# Patient Record
Sex: MALE | Race: WHITE | NOT HISPANIC OR LATINO | Employment: OTHER | URBAN - NONMETROPOLITAN AREA
[De-identification: names, ages, dates, MRNs, and addresses within clinical notes are randomized per-mention and may not be internally consistent; named-entity substitution may affect disease eponyms.]

---

## 2017-01-27 ENCOUNTER — OFFICE VISIT (OUTPATIENT)
Dept: FAMILY MEDICINE CLINIC | Facility: CLINIC | Age: 67
End: 2017-01-27

## 2017-01-27 VITALS
WEIGHT: 145 LBS | BODY MASS INDEX: 21.98 KG/M2 | DIASTOLIC BLOOD PRESSURE: 60 MMHG | HEART RATE: 80 BPM | SYSTOLIC BLOOD PRESSURE: 104 MMHG | HEIGHT: 68 IN

## 2017-01-27 DIAGNOSIS — K21.9 GASTROESOPHAGEAL REFLUX DISEASE WITHOUT ESOPHAGITIS: Chronic | ICD-10-CM

## 2017-01-27 DIAGNOSIS — Z11.59 NEED FOR HEPATITIS C SCREENING TEST: ICD-10-CM

## 2017-01-27 DIAGNOSIS — E78.2 MIXED HYPERLIPIDEMIA: Chronic | ICD-10-CM

## 2017-01-27 DIAGNOSIS — I10 ESSENTIAL HYPERTENSION: Chronic | ICD-10-CM

## 2017-01-27 DIAGNOSIS — E11.9 TYPE 2 DIABETES MELLITUS WITHOUT COMPLICATION, WITHOUT LONG-TERM CURRENT USE OF INSULIN (HCC): Primary | Chronic | ICD-10-CM

## 2017-01-27 DIAGNOSIS — B35.1 ONYCHOMYCOSIS: ICD-10-CM

## 2017-01-27 PROCEDURE — 99214 OFFICE O/P EST MOD 30 MIN: CPT | Performed by: INTERNAL MEDICINE

## 2017-01-27 NOTE — PROGRESS NOTES
Subjective   Ronald Irizarry is a 66 y.o. male who presents to the office for follow-up and review of labs. He has diabetes and takes his medication as directed.  He has not been as compliant with diet through the holidays.  He monitors his blood sugar one time daily.  He has hyperlipidemia and takes pravastatin daily.  He has hypertension and his blood pressure has been well controlled.  He has osteoarthritis and takes over-the-counter NSAIDs as needed.  He takes Nexium daily for treatment of GERD.  He complains of thickening of his toenails.  He has taken oral Lamisil in the past, and is asking about taking this again.      History of Present Illness     The following portions of the patient's history were reviewed and updated as appropriate: allergies, current medications, past family history, past medical history, past social history, past surgical history and problem list.    Review of Systems   Constitutional: Negative for chills, fatigue and fever.   HENT: Negative for congestion, sneezing, sore throat and trouble swallowing.    Eyes: Negative for visual disturbance.   Respiratory: Negative for cough, chest tightness, shortness of breath and wheezing.    Cardiovascular: Negative for chest pain, palpitations and leg swelling.   Gastrointestinal: Negative for abdominal pain, constipation, diarrhea, nausea and vomiting.   Genitourinary: Negative for dysuria, frequency and urgency.   Musculoskeletal: Negative for neck pain.   Skin: Negative for rash.   Neurological: Negative for dizziness, weakness and headaches.   Psychiatric/Behavioral:        Patient denies any feelings of depression and has not felt down, hopeless or lost interest in any activities.   All other systems reviewed and are negative.      Objective   Physical Exam   Constitutional: He is oriented to person, place, and time. He appears well-developed and well-nourished. No distress.   HENT:   Head: Normocephalic and atraumatic.   Nose: Nose  normal.   Mouth/Throat: Oropharynx is clear and moist. No oropharyngeal exudate.   Eyes: Conjunctivae and EOM are normal. Pupils are equal, round, and reactive to light. No scleral icterus.   Neck: Normal range of motion. Neck supple.   Cardiovascular: Normal rate, regular rhythm and normal heart sounds.  Exam reveals no gallop and no friction rub.    No murmur heard.  Pulmonary/Chest: Effort normal and breath sounds normal. No respiratory distress. He has no wheezes. He has no rales.   Abdominal: Soft. Bowel sounds are normal. He exhibits no distension. There is no tenderness. There is no rebound and no guarding.   Musculoskeletal: Normal range of motion. He exhibits no edema.   Lymphadenopathy:     He has no cervical adenopathy.   Neurological: He is alert and oriented to person, place, and time. No cranial nerve deficit.   Skin: Skin is warm and dry. No rash noted.   Psychiatric: He has a normal mood and affect. His behavior is normal. Judgment and thought content normal.   Nursing note and vitals reviewed.      Assessment/Plan   Ronald was seen today for diabetes, hypertension and hyperlipidemia.    Diagnoses and all orders for this visit:    Type 2 diabetes mellitus without complication, without long-term current use of insulin  -     CBC & Differential; Future  -     Comprehensive Metabolic Panel; Future  -     Hemoglobin A1c; Future  -     Urinalysis With / Culture If Indicated; Future    Essential hypertension    Mixed hyperlipidemia  -     LDL Cholesterol, Direct; Future    Gastroesophageal reflux disease without esophagitis    Need for hepatitis C screening test  -     Hepatitis Panel, Acute; Future    Onychomycosis  Comments:  Toenails - Patient offered and refuses treatment         Labs are reviewed with patient.  His hemoglobin A1c is a little elevated compared to previous visit.  He'll continue with his current diabetic medication.  He will try to be more compliant with his diet.  He may monitor blood  sugar one time daily.  His lipids are controlled and he will continue with pravastatin.  His blood pressure is well controlled and he will continue with lisinopril.  He will continue with Nexium for treatment of his GERD.    I discussed taking another course of oral Lamisil for treatment of the onychomycosis.  He states that he will think about this and let me know if he decides to take it in the future.    PHQ-9 Depression Screening 1/27/2017   Little interest or pleasure in doing things 0   Feeling down, depressed, or hopeless 0   Trouble falling or staying asleep, or sleeping too much 0   Feeling tired or having little energy 0   Poor appetite or overeating 0   Feeling bad about yourself - or that you are a failure or have let yourself or your family down 0   Trouble concentrating on things, such as reading the newspaper or watching television 0   Moving or speaking so slowly that other people could have noticed. Or the opposite - being so fidgety or restless that you have been moving around a lot more than usual 0   Thoughts that you would be better off dead, or of hurting yourself in some way 0   PHQ-9 Total Score 0         Hospital Outpatient Visit on 01/20/2017   Component Date Value Ref Range Status   • Color, UA 01/20/2017 Yellow* STRAW,YELLOW,DK YELLOW,BIMAL Final   • Appearance 01/20/2017 Clear* CLEAR Final   • Glucose, Urine 01/20/2017 NEGATIVE  NEGATIVE mg/dl Final   • Ketones, UA 01/20/2017 NEGATIVE  NEGATIVE Final   • Specific Gravity, UA 01/20/2017 1.020  1.003 - 1.030 Final   • Blood, UA 01/20/2017 NEGATIVE  NEGATIVE Final   • pH, UA 01/20/2017 7.0* 5.5 - 8.0 pH Units Final    pH Normal: 5.0 - 9.0    • Protein, UA 01/20/2017 NEGATIVE  NEGATIVE Final   • Urobilinogen, UA 01/20/2017 0.2  0.2 EU/dl Final   • Nitrite, UA 01/20/2017 NEGATIVE  NEGATIVE Final   • Leukocytes, UA 01/20/2017 NEGATIVE  NEGATIVE Final   • WBC 01/20/2017 7.0  3.2 - 9.8 x1000/uL Final   • RBC 01/20/2017 5.07  4.37 - 5.74 camila/mm3  Final   • Hemoglobin 01/20/2017 14.9  13.7 - 17.3 gm/dl Final   • Hematocrit 01/20/2017 44.7  39.0 - 49.0 % Final   • MCV 01/20/2017 88.2  80.0 - 98.0 fl Final   • MCH 01/20/2017 29.4  26.0 - 34.0 pg Final   • MCHC 01/20/2017 33.3  31.5 - 36.3 gm/dl Final   • Platelets 01/20/2017 248  150 - 450 x1000/mm3 Final   • RDW 01/20/2017 12.8  11.5 - 14.5 % Final   • MPV 01/20/2017 9.0  8.0 - 12.0 fl Final   • Neutrophil Rel % 01/20/2017 50.9  37.0 - 80.0 % Final   • Lymphocyte Rel % 01/20/2017 37.4  10.0 - 50.0 % Final   • Monocyte Rel % 01/20/2017 6.0  0.0 - 12.0 % Final   • Eosinophil Rel % 01/20/2017 5.4  0.0 - 7.0 % Final   • Basophil Rel % 01/20/2017 0.3  0.0 - 2.0 % Final   • nRBC 01/20/2017 0   Final   • nRBC 01/20/2017 0   Final   • Glucose 01/20/2017 119* 70.0 - 100.0 mg/dl Final   • BUN 01/20/2017 17  8.0 - 25.0 mg/dl Final   • Creatinine 01/20/2017 0.8  0.4 - 1.3 mg/dl Final   • Sodium 01/20/2017 140.0  134 - 146 mmol/L Final   • Potassium 01/20/2017 4.4  3.4 - 5.4 mmol/L Final   • Chloride 01/20/2017 102.0  100.0 - 112.0 mmol/L Final   • CO2 01/20/2017 31.0  20.0 - 32.0 mmol/L Final   • Calcium 01/20/2017 9.7  8.4 - 10.8 mg/dl Final   • Total Protein 01/20/2017 7.2  6.7 - 8.2 gm/dl Final   • Albumin 01/20/2017 4.2  3.2 - 5.5 gm/dl Final   • Total Bilirubin 01/20/2017 0.8  0.2 - 1.0 mg/dl Final   • Alkaline Phosphatase 01/20/2017 46  15 - 121 U/L Final   • ALT (SGPT) 01/20/2017 16  10 - 60 U/L Final   • AST (SGOT) 01/20/2017 16  10 - 60 U/L Final   • GFR MDRD Non  01/20/2017 96  49 - 113 mL/min/1.73 sq.M Final    Comment: Invalid if creatinine is changing or the patient is on dialysis. Use AA  result if patient is -American, non AA result otherwise.     • GFR MDRD  01/20/2017 117* 49 - 113 mL/min/1.73 sq.M Final   • Anion Gap 01/20/2017 7.0  5.0 - 15.0 mmol/L Final   • Total Cholesterol 01/20/2017 185  150 - 200 mg/dl Final    CHOL DESIRED: < 200 MG/DL   • Triglycerides  01/20/2017 109  35 - 160 mg/dl Final    TRIG DESIRED: < 200 MG/DL   • HDL Cholesterol 01/20/2017 40.2  35.0 - 100.0 mg/dl Final    HDL AVERAGE RISK: 35 - 60 MG/DL   • LDL Cholesterol  01/20/2017 123  mg/dl Final    Comment: LDL DESIRED: < 130 MG/DL  LDL DESIRED: < 130 MG/DL     Hospital Outpatient Visit on 01/20/2017   Component Date Value Ref Range Status   • Hemoglobin A1C 01/20/2017 7.3* 4.0 - 5.6 %TotHgb Final   • Creatinine, Urine 01/20/2017 48.2  mg/dl Final   • Albumin, U 01/20/2017 0.6  0.0 - 1.7 mg/dl Final   • Microalbumin/Creatinine Ratio 01/20/2017 12  0 - 30 mg/g Final   • Free T4 01/20/2017 1.40  0.78 - 2.19 ng/dl Final   • PSA 01/20/2017 1.23  0.00 - 4.00 ng/ml Final    Comment: The lowest detectable amount distinguishable from 0.00 for PSA is 0.06  ng/mL.     • TSH 01/20/2017 0.99  0.46 - 4.68 uIU/ml Final   ]

## 2017-01-27 NOTE — MR AVS SNAPSHOT
Ronaldhussain Irizarry   1/27/2017 1:15 PM   Office Visit    Dept Phone:  [phone]   Encounter #:  [id number]    Provider:  Anselmo Manriquez MD   Department:  [facility]                Your Full Care Plan              Your Updated Medication List          This list is accurate as of: 1/27/17  2:40 PM.  Always use your most recent med list.                CENTRUM SILVER PO       esomeprazole 40 MG capsule   Commonly known as:  nexIUM   Take 1 capsule by mouth  daily       glucose blood test strip       lisinopril 10 MG tablet   Commonly known as:  PRINIVIL,ZESTRIL   Take 1 tablet by mouth  daily       metFORMIN  MG 24 hr tablet   Commonly known as:  GLUCOPHAGE-XR       pravastatin 20 MG tablet   Commonly known as:  PRAVACHOL       Vitamin E 400 UNITS tablet               You Were Diagnosed With        Codes Comments    Type 2 diabetes mellitus without complication, without long-term current use of insulin    -  Primary ICD-10-CM: E11.9  ICD-9-CM: 250.00     Essential hypertension     ICD-10-CM: I10  ICD-9-CM: 401.9     Mixed hyperlipidemia     ICD-10-CM: E78.2  ICD-9-CM: 272.2     Gastroesophageal reflux disease without esophagitis     ICD-10-CM: K21.9  ICD-9-CM: 530.81     Need for hepatitis C screening test     ICD-10-CM: Z11.59  ICD-9-CM: V73.89     Onychomycosis     ICD-10-CM: B35.1  ICD-9-CM: 110.1 Toenails - Patient offered and refuses treatment      Instructions     None    Patient Instructions History      Upcoming Appointments     Visit Type Date Time Department    FOLLOW UP 1/27/2017  1:15 PM [facility]    FOLLOW UP 6/2/2017  2:30 PM [facility]      MyChart Signup     Our records indicate that your [facility] [employer] account has been deactivated. If you would like to reactivate your account, please email [email or web address] or call [phone] to talk to our [employer] staff.             Other Info from Your Visit       "       Your Appointments     Jun 02, 2017  2:30 PM CDT   Follow Up with Anselmo Manriquez MD   [facility] (--)    [address] [place] [address] [address] [place] [address] [phone]           Arrive 15 minutes prior to appointment.              Allergies     Other      Bandaids      Reason for Visit     Diabetes     Hypertension     Hyperlipidemia           Vital Signs     Blood Pressure Pulse Height Weight Body Mass Index Smoking Status    104/60 (BP Location: Left arm, Patient Position: Sitting, Cuff Size: Adult) 80 68\" (172.7 cm) 145 lb (65.8 kg) 22.05 kg/m2 Never Smoker      Problems and Diagnoses Noted     High blood pressure    Acid reflux disease    Mixed hyperlipidemia    Type 2 diabetes mellitus without complication, without long-term current use of insulin    Need for hepatitis C screening test        Onychomycosis            "

## 2017-02-02 RX ORDER — ESOMEPRAZOLE MAGNESIUM 40 MG/1
CAPSULE, DELAYED RELEASE ORAL
Qty: 90 CAPSULE | Refills: 1 | Status: SHIPPED | OUTPATIENT
Start: 2017-02-02 | End: 2017-02-16 | Stop reason: ALTCHOICE

## 2017-02-02 RX ORDER — LISINOPRIL 10 MG/1
TABLET ORAL
Qty: 90 TABLET | Refills: 1 | Status: SHIPPED | OUTPATIENT
Start: 2017-02-02 | End: 2017-06-02 | Stop reason: SDUPTHER

## 2017-02-02 RX ORDER — PRAVASTATIN SODIUM 20 MG
TABLET ORAL
Qty: 90 TABLET | Refills: 1 | Status: SHIPPED | OUTPATIENT
Start: 2017-02-02 | End: 2017-06-02 | Stop reason: SDUPTHER

## 2017-02-02 RX ORDER — METFORMIN HYDROCHLORIDE 500 MG/1
TABLET, EXTENDED RELEASE ORAL
Qty: 180 TABLET | Refills: 1 | Status: SHIPPED | OUTPATIENT
Start: 2017-02-02 | End: 2017-02-16 | Stop reason: SDUPTHER

## 2017-02-16 DIAGNOSIS — E11.9 TYPE 2 DIABETES MELLITUS WITHOUT COMPLICATION, WITHOUT LONG-TERM CURRENT USE OF INSULIN (HCC): Primary | ICD-10-CM

## 2017-02-16 RX ORDER — PANTOPRAZOLE SODIUM 40 MG/1
40 TABLET, DELAYED RELEASE ORAL DAILY
Qty: 90 TABLET | Refills: 1 | Status: SHIPPED | OUTPATIENT
Start: 2017-02-16 | End: 2017-08-15 | Stop reason: SDUPTHER

## 2017-02-16 RX ORDER — METFORMIN HYDROCHLORIDE 500 MG/1
TABLET, EXTENDED RELEASE ORAL
Qty: 180 TABLET | Refills: 1 | Status: SHIPPED | OUTPATIENT
Start: 2017-02-16 | End: 2017-09-11 | Stop reason: SDUPTHER

## 2017-02-16 RX ORDER — TERBINAFINE HYDROCHLORIDE 250 MG/1
250 TABLET ORAL DAILY
Qty: 30 TABLET | Refills: 2 | Status: SHIPPED | OUTPATIENT
Start: 2017-02-16 | End: 2017-06-02

## 2017-05-26 ENCOUNTER — LAB (OUTPATIENT)
Dept: LAB | Facility: OTHER | Age: 67
End: 2017-05-26

## 2017-05-26 DIAGNOSIS — E11.9 TYPE 2 DIABETES MELLITUS WITHOUT COMPLICATION, WITHOUT LONG-TERM CURRENT USE OF INSULIN (HCC): ICD-10-CM

## 2017-05-26 DIAGNOSIS — E78.2 MIXED HYPERLIPIDEMIA: Chronic | ICD-10-CM

## 2017-05-26 DIAGNOSIS — Z11.59 NEED FOR HEPATITIS C SCREENING TEST: ICD-10-CM

## 2017-05-26 LAB
ALBUMIN SERPL-MCNC: 4.2 G/DL (ref 3.2–5.5)
ALBUMIN/GLOB SERPL: 1.5 G/DL (ref 1–3)
ALP SERPL-CCNC: 44 U/L (ref 15–121)
ALT SERPL W P-5'-P-CCNC: 15 U/L (ref 10–60)
ANION GAP SERPL CALCULATED.3IONS-SCNC: 11 MMOL/L (ref 5–15)
ARTICHOKE IGE QN: 118 MG/DL (ref 0–129)
AST SERPL-CCNC: 16 U/L (ref 10–60)
BASOPHILS # BLD AUTO: 0.02 10*3/MM3 (ref 0–0.2)
BASOPHILS NFR BLD AUTO: 0.3 % (ref 0–2)
BILIRUB SERPL-MCNC: 0.5 MG/DL (ref 0.2–1)
BILIRUB UR QL STRIP: NEGATIVE
BUN BLD-MCNC: 16 MG/DL (ref 8–25)
BUN/CREAT SERPL: 20 (ref 7–25)
CALCIUM SPEC-SCNC: 9.2 MG/DL (ref 8.4–10.8)
CHLORIDE SERPL-SCNC: 100 MMOL/L (ref 100–112)
CLARITY UR: CLEAR
CO2 SERPL-SCNC: 27 MMOL/L (ref 20–32)
COLOR UR: YELLOW
CREAT BLD-MCNC: 0.8 MG/DL (ref 0.4–1.3)
DEPRECATED RDW RBC AUTO: 40.2 FL (ref 35.1–43.9)
EOSINOPHIL # BLD AUTO: 0.38 10*3/MM3 (ref 0–0.7)
EOSINOPHIL NFR BLD AUTO: 5.2 % (ref 0–7)
ERYTHROCYTE [DISTWIDTH] IN BLOOD BY AUTOMATED COUNT: 12.6 % (ref 11.5–14.5)
GFR SERPL CREATININE-BSD FRML MDRD: 96 ML/MIN/1.73 (ref 49–113)
GLOBULIN UR ELPH-MCNC: 2.8 GM/DL (ref 2.5–4.6)
GLUCOSE BLD-MCNC: 123 MG/DL (ref 70–100)
GLUCOSE UR STRIP-MCNC: NEGATIVE MG/DL
HAV IGM SERPL QL IA: NEGATIVE
HBA1C MFR BLD: 6.8 % (ref 4–5.6)
HBV CORE IGM SERPL QL IA: NEGATIVE
HBV SURFACE AG SERPL QL IA: NEGATIVE
HCT VFR BLD AUTO: 42.5 % (ref 39–49)
HCV AB SER DONR QL: NEGATIVE
HGB BLD-MCNC: 14.1 G/DL (ref 13.7–17.3)
HGB UR QL STRIP.AUTO: NEGATIVE
KETONES UR QL STRIP: NEGATIVE
LEUKOCYTE ESTERASE UR QL STRIP.AUTO: NEGATIVE
LYMPHOCYTES # BLD AUTO: 3.03 10*3/MM3 (ref 0.6–4.2)
LYMPHOCYTES NFR BLD AUTO: 41.1 % (ref 10–50)
MCH RBC QN AUTO: 29.4 PG (ref 26.5–34)
MCHC RBC AUTO-ENTMCNC: 33.2 G/DL (ref 31.5–36.3)
MCV RBC AUTO: 88.7 FL (ref 80–98)
MONOCYTES # BLD AUTO: 0.41 10*3/MM3 (ref 0–0.9)
MONOCYTES NFR BLD AUTO: 5.6 % (ref 0–12)
NEUTROPHILS # BLD AUTO: 3.53 10*3/MM3 (ref 2–8.6)
NEUTROPHILS NFR BLD AUTO: 47.8 % (ref 37–80)
NITRITE UR QL STRIP: NEGATIVE
PH UR STRIP.AUTO: 8 [PH] (ref 5.5–8)
PLATELET # BLD AUTO: 246 10*3/MM3 (ref 150–450)
PMV BLD AUTO: 8.9 FL (ref 8–12)
POTASSIUM BLD-SCNC: 4.3 MMOL/L (ref 3.4–5.4)
PROT SERPL-MCNC: 7 G/DL (ref 6.7–8.2)
PROT UR QL STRIP: NEGATIVE
RBC # BLD AUTO: 4.79 10*6/MM3 (ref 4.37–5.74)
SODIUM BLD-SCNC: 138 MMOL/L (ref 134–146)
SP GR UR STRIP: 1.01 (ref 1–1.03)
UROBILINOGEN UR QL STRIP: NORMAL
WBC NRBC COR # BLD: 7.37 10*3/MM3 (ref 3.2–9.8)

## 2017-05-26 PROCEDURE — 85025 COMPLETE CBC W/AUTO DIFF WBC: CPT | Performed by: INTERNAL MEDICINE

## 2017-05-26 PROCEDURE — 83721 ASSAY OF BLOOD LIPOPROTEIN: CPT | Performed by: INTERNAL MEDICINE

## 2017-05-26 PROCEDURE — 36415 COLL VENOUS BLD VENIPUNCTURE: CPT | Performed by: INTERNAL MEDICINE

## 2017-05-26 PROCEDURE — 83036 HEMOGLOBIN GLYCOSYLATED A1C: CPT | Performed by: INTERNAL MEDICINE

## 2017-05-26 PROCEDURE — 80074 ACUTE HEPATITIS PANEL: CPT | Performed by: INTERNAL MEDICINE

## 2017-05-26 PROCEDURE — 80053 COMPREHEN METABOLIC PANEL: CPT | Performed by: INTERNAL MEDICINE

## 2017-05-26 PROCEDURE — 81003 URINALYSIS AUTO W/O SCOPE: CPT | Performed by: INTERNAL MEDICINE

## 2017-06-02 ENCOUNTER — OFFICE VISIT (OUTPATIENT)
Dept: FAMILY MEDICINE CLINIC | Facility: CLINIC | Age: 67
End: 2017-06-02

## 2017-06-02 VITALS
HEART RATE: 84 BPM | SYSTOLIC BLOOD PRESSURE: 112 MMHG | WEIGHT: 148 LBS | TEMPERATURE: 98.1 F | BODY MASS INDEX: 22.43 KG/M2 | HEIGHT: 68 IN | DIASTOLIC BLOOD PRESSURE: 60 MMHG

## 2017-06-02 DIAGNOSIS — I10 ESSENTIAL HYPERTENSION: Chronic | ICD-10-CM

## 2017-06-02 DIAGNOSIS — M15.9 PRIMARY OSTEOARTHRITIS INVOLVING MULTIPLE JOINTS: Chronic | ICD-10-CM

## 2017-06-02 DIAGNOSIS — E11.9 TYPE 2 DIABETES MELLITUS WITHOUT COMPLICATION, WITHOUT LONG-TERM CURRENT USE OF INSULIN (HCC): Chronic | ICD-10-CM

## 2017-06-02 DIAGNOSIS — E78.2 MIXED HYPERLIPIDEMIA: Chronic | ICD-10-CM

## 2017-06-02 DIAGNOSIS — K21.9 GASTROESOPHAGEAL REFLUX DISEASE WITHOUT ESOPHAGITIS: Chronic | ICD-10-CM

## 2017-06-02 DIAGNOSIS — Z00.00 INITIAL MEDICARE ANNUAL WELLNESS VISIT: Primary | ICD-10-CM

## 2017-06-02 DIAGNOSIS — Z23 PNEUMOCOCCAL VACCINATION GIVEN: ICD-10-CM

## 2017-06-02 PROCEDURE — 90471 IMMUNIZATION ADMIN: CPT | Performed by: INTERNAL MEDICINE

## 2017-06-02 PROCEDURE — 99214 OFFICE O/P EST MOD 30 MIN: CPT | Performed by: INTERNAL MEDICINE

## 2017-06-02 PROCEDURE — G0438 PPPS, INITIAL VISIT: HCPCS | Performed by: INTERNAL MEDICINE

## 2017-06-02 PROCEDURE — 90670 PCV13 VACCINE IM: CPT | Performed by: INTERNAL MEDICINE

## 2017-06-02 RX ORDER — LISINOPRIL 10 MG/1
10 TABLET ORAL DAILY
Qty: 90 TABLET | Refills: 1 | Status: SHIPPED | OUTPATIENT
Start: 2017-06-02 | End: 2017-12-12 | Stop reason: SDUPTHER

## 2017-06-02 RX ORDER — PRAVASTATIN SODIUM 20 MG
20 TABLET ORAL NIGHTLY
Qty: 90 TABLET | Refills: 1 | Status: SHIPPED | OUTPATIENT
Start: 2017-06-02 | End: 2017-12-12 | Stop reason: SDUPTHER

## 2017-06-02 NOTE — PROGRESS NOTES
Subjective   Ronald Irizarry is a 67 y.o. male who presents to the office for follow-up and review of labs. He has diabetes and takes his medication as directed.  His blood sugar has been controlled, and he has been compliant with his diabetic diet.  He monitors his blood sugar one time daily.  He has hyperlipidemia and takes pravastatin daily.  He has hypertension and his blood pressure has been well controlled.  He has osteoarthritis and takes over-the-counter NSAIDs as needed.  He takes Nexium daily for treatment of GERD.    History of Present Illness     The following portions of the patient's history were reviewed and updated as appropriate: allergies, current medications, past family history, past medical history, past social history, past surgical history and problem list.    Review of Systems   Constitutional: Negative for chills, fatigue and fever.   HENT: Negative for congestion, sneezing, sore throat and trouble swallowing.    Eyes: Negative for visual disturbance.   Respiratory: Negative for cough, chest tightness, shortness of breath and wheezing.    Cardiovascular: Negative for chest pain, palpitations and leg swelling.   Gastrointestinal: Negative for abdominal pain, constipation, diarrhea, nausea and vomiting.   Genitourinary: Negative for dysuria, frequency and urgency.   Musculoskeletal: Negative for neck pain.   Skin: Negative for rash.   Neurological: Negative for dizziness, weakness and headaches.   Psychiatric/Behavioral:        Patient denies any feelings of depression and has not felt down, hopeless or lost interest in any activities.   All other systems reviewed and are negative.      Objective   Physical Exam   Constitutional: He is oriented to person, place, and time. He appears well-developed and well-nourished. No distress.   HENT:   Head: Normocephalic and atraumatic.   Nose: Nose normal.   Mouth/Throat: Oropharynx is clear and moist. No oropharyngeal exudate.   Eyes: Conjunctivae and  EOM are normal. Pupils are equal, round, and reactive to light. No scleral icterus.   Neck: Normal range of motion. Neck supple.   Cardiovascular: Normal rate, regular rhythm and normal heart sounds.  Exam reveals no gallop and no friction rub.    No murmur heard.  Pulmonary/Chest: Effort normal and breath sounds normal. No respiratory distress. He has no wheezes. He has no rales.   Abdominal: Soft. Bowel sounds are normal. He exhibits no distension. There is no tenderness. There is no rebound and no guarding.   Musculoskeletal: Normal range of motion. He exhibits no edema.    Ronald had a diabetic foot exam performed today.   During the foot exam he had a monofilament test performed.    Neurological Sensory Findings - Unaltered hot/cold right ankle/foot discrimination and unaltered hot/cold left ankle/foot discrimination. Unaltered sharp/dull right ankle/foot discrimination and unaltered sharp/dull left ankle/foot discrimination. No right ankle/foot altered proprioception and no left ankle/foot altered proprioception    Vascular Status -  His exam exhibits right foot vasculature normal. His exam exhibits no right foot edema. His exam exhibits left foot vasculature normal. His exam exhibits no left foot edema.   Skin Integrity  -  His right foot skin is intact.  He has right foot onychomycosis.    Ronald 's left foot skin is intact. He has left foot onychomycosis..   Foot Structure and Biomechanics -  His right foot has no claw toes and no hammer toes present. His left foot has no claw toes and no hammer toes.      Lymphadenopathy:     He has no cervical adenopathy.   Neurological: He is alert and oriented to person, place, and time. No cranial nerve deficit.   Skin: Skin is warm and dry. No rash noted.   Psychiatric: He has a normal mood and affect. His behavior is normal. Judgment and thought content normal.   Nursing note and vitals reviewed.      Assessment/Plan   Ronald was seen today for annual exam,  diabetes and hypertension.    Diagnoses and all orders for this visit:    Initial Medicare annual wellness visit    Type 2 diabetes mellitus without complication, without long-term current use of insulin  -     Hemoglobin A1c; Future    Essential hypertension  -     CBC & Differential; Future  -     Comprehensive Metabolic Panel; Future  -     T4, Free; Future  -     TSH; Future  -     Urinalysis With / Culture If Indicated; Future  -     lisinopril (PRINIVIL,ZESTRIL) 10 MG tablet; Take 1 tablet by mouth Daily.    Mixed hyperlipidemia  -     LDL Cholesterol, Direct; Future  -     pravastatin (PRAVACHOL) 20 MG tablet; Take 1 tablet by mouth Every Night.    Gastroesophageal reflux disease without esophagitis    Primary osteoarthritis involving multiple joints    Pneumococcal vaccination given  -     Pneumococcal Conjugate Vaccine 13-Valent All         Labs are reviewed with patient.  His glucose and hemoglobin A1c have improved from the previous visit.  His diabetes is well controlled.  He will continue with his current diabetic medications and may monitor blood sugar one time daily.  His LDL is controlled and he will continue with pravastatin.  His blood pressure is well controlled and he will continue with lisinopril.  He will continue with Nexium for treatment of his GERD.      PHQ-9 Depression Screening 6/2/2017   Little interest or pleasure in doing things 0   Feeling down, depressed, or hopeless 0   Trouble falling or staying asleep, or sleeping too much 0   Feeling tired or having little energy 0   Poor appetite or overeating 0   Feeling bad about yourself - or that you are a failure or have let yourself or your family down 0   Trouble concentrating on things, such as reading the newspaper or watching television 0   Moving or speaking so slowly that other people could have noticed. Or the opposite - being so fidgety or restless that you have been moving around a lot more than usual 0   Thoughts that you would  be better off dead, or of hurting yourself in some way 0   PHQ-9 Total Score 0         Lab on 05/26/2017   Component Date Value Ref Range Status   • Glucose 05/26/2017 123* 70 - 100 mg/dL Final   • BUN 05/26/2017 16  8 - 25 mg/dL Final   • Creatinine 05/26/2017 0.80  0.40 - 1.30 mg/dL Final   • Sodium 05/26/2017 138  134 - 146 mmol/L Final   • Potassium 05/26/2017 4.3  3.4 - 5.4 mmol/L Final   • Chloride 05/26/2017 100  100 - 112 mmol/L Final   • CO2 05/26/2017 27.0  20.0 - 32.0 mmol/L Final   • Calcium 05/26/2017 9.2  8.4 - 10.8 mg/dL Final   • Total Protein 05/26/2017 7.0  6.7 - 8.2 g/dL Final   • Albumin 05/26/2017 4.20  3.20 - 5.50 g/dL Final   • ALT (SGPT) 05/26/2017 15  10 - 60 U/L Final   • AST (SGOT) 05/26/2017 16  10 - 60 U/L Final   • Alkaline Phosphatase 05/26/2017 44  15 - 121 U/L Final   • Total Bilirubin 05/26/2017 0.5  0.2 - 1.0 mg/dL Final   • eGFR Non  Amer 05/26/2017 96  49 - 113 mL/min/1.73 Final   • Globulin 05/26/2017 2.8  2.5 - 4.6 gm/dL Final   • A/G Ratio 05/26/2017 1.5  1.0 - 3.0 g/dL Final   • BUN/Creatinine Ratio 05/26/2017 20.0  7.0 - 25.0 Final   • Anion Gap 05/26/2017 11.0  5.0 - 15.0 mmol/L Final   • Hemoglobin A1C 05/26/2017 6.80* 4 - 5.6 % Final   • LDL Cholesterol  05/26/2017 118  0 - 129 mg/dL Final   • Color, UA 05/26/2017 Yellow  Yellow, Straw Final   • Appearance, UA 05/26/2017 Clear  Clear Final   • pH, UA 05/26/2017 8.0  5.5 - 8.0 Final   • Specific Gravity, UA 05/26/2017 1.015  1.005 - 1.030 Final   • Glucose, UA 05/26/2017 Negative  Negative Final   • Ketones, UA 05/26/2017 Negative  Negative Final   • Bilirubin, UA 05/26/2017 Negative  Negative Final   • Blood, UA 05/26/2017 Negative  Negative Final   • Protein, UA 05/26/2017 Negative  Negative Final   • Leuk Esterase, UA 05/26/2017 Negative  Negative Final   • Nitrite, UA 05/26/2017 Negative  Negative Final   • Urobilinogen, UA 05/26/2017 0.2 E.U./dL  0.2 - 1.0 E.U./dL Final   • Hepatitis C Ab 05/26/2017 Negative   Negative Final   • Hep A IgM 05/26/2017 Negative  Negative Final   • Hep B C IgM 05/26/2017 Negative  Negative Final   • Hepatitis B Surface Ag 05/26/2017 Negative  Negative Final   • WBC 05/26/2017 7.37  3.20 - 9.80 10*3/mm3 Final   • RBC 05/26/2017 4.79  4.37 - 5.74 10*6/mm3 Final   • Hemoglobin 05/26/2017 14.1  13.7 - 17.3 g/dL Final   • Hematocrit 05/26/2017 42.5  39.0 - 49.0 % Final   • MCV 05/26/2017 88.7  80.0 - 98.0 fL Final   • MCH 05/26/2017 29.4  26.5 - 34.0 pg Final   • MCHC 05/26/2017 33.2  31.5 - 36.3 g/dL Final   • RDW 05/26/2017 12.6  11.5 - 14.5 % Final   • RDW-SD 05/26/2017 40.2  35.1 - 43.9 fl Final   • MPV 05/26/2017 8.9  8.0 - 12.0 fL Final   • Platelets 05/26/2017 246  150 - 450 10*3/mm3 Final   • Neutrophil % 05/26/2017 47.8  37.0 - 80.0 % Final   • Lymphocyte % 05/26/2017 41.1  10.0 - 50.0 % Final   • Monocyte % 05/26/2017 5.6  0.0 - 12.0 % Final   • Eosinophil % 05/26/2017 5.2  0.0 - 7.0 % Final   • Basophil % 05/26/2017 0.3  0.0 - 2.0 % Final   • Neutrophils, Absolute 05/26/2017 3.53  2.00 - 8.60 10*3/mm3 Final   • Lymphocytes, Absolute 05/26/2017 3.03  0.60 - 4.20 10*3/mm3 Final   • Monocytes, Absolute 05/26/2017 0.41  0.00 - 0.90 10*3/mm3 Final   • Eosinophils, Absolute 05/26/2017 0.38  0.00 - 0.70 10*3/mm3 Final   • Basophils, Absolute 05/26/2017 0.02  0.00 - 0.20 10*3/mm3 Final   ]

## 2017-06-02 NOTE — PROGRESS NOTES
QUICK REFERENCE INFORMATION:  The ABCs of the Annual Wellness Visit    Initial Medicare Wellness Visit    HEALTH RISK ASSESSMENT    1950    Recent Hospitalizations:  No hospitalization(s) within the last year..        Current Medical Providers:  Patient Care Team:  Anselmo Manriquez MD as PCP - General (Family Medicine)  Hasmukh Jo OD as PCP - Claims Attributed        Smoking Status:  History   Smoking Status   • Never Smoker   Smokeless Tobacco   • Never Used       Alcohol Consumption:  History   Alcohol Use No       Depression Screen:   PHQ-9 Depression Screening 6/2/2017   Little interest or pleasure in doing things 0   Feeling down, depressed, or hopeless 0   Trouble falling or staying asleep, or sleeping too much 0   Feeling tired or having little energy 0   Poor appetite or overeating 0   Feeling bad about yourself - or that you are a failure or have let yourself or your family down 0   Trouble concentrating on things, such as reading the newspaper or watching television 0   Moving or speaking so slowly that other people could have noticed. Or the opposite - being so fidgety or restless that you have been moving around a lot more than usual 0   Thoughts that you would be better off dead, or of hurting yourself in some way 0   PHQ-9 Total Score 0       Health Habits and Functional and Cognitive Screening:  Functional & Cognitive Status 6/2/2017   Do you have difficulty preparing food and eating? No   Do you have difficulty bathing yourself? No   Do you have difficulty getting dressed? No   Do you have difficulty using the toilet? No   Do you have difficulty moving around from place to place? No   In the past year have you fallen or experienced a near fall? No   Do you need help using the phone?  No   Are you deaf or do you have serious difficulty hearing?  No   Do you need help with transportation? No   Do you need help shopping? No   Do you need help preparing meals?  No   Do you need help with  housework?  No   Do you need help with laundry? No   Do you need help taking your medications? No   Do you need help managing money? No   Do you have difficulty concentrating, remembering or making decisions? No       Health Habits  Current Diet: Well Balanced Diet  Dental Exam: Up to date  Eye Exam: Up to date (Dr. Jo)  Exercise (times per week): 0 times per week  Current Exercise Activities Include:  (helps some with housework, walks to the mailbox)          Does the patient have evidence of cognitive impairment? No    Asiprin use counseling: Does not need ASA (and currently is not on it)      Recent Lab Results:    Visual Acuity:  No exam data present    Age-appropriate Screening Schedule:  Refer to the list below for future screening recommendations based on patient's age, sex and/or medical conditions. Orders for these recommended tests are listed in the plan section. The patient has been provided with a written plan.    Health Maintenance   Topic Date Due   • TDAP/TD VACCINES (1 - Tdap) 01/31/1968   • PNEUMOCOCCAL VACCINES (65+ LOW/MEDIUM RISK) (1 of 2 - PCV13) 01/31/2014   • ZOSTER VACCINE  09/02/2016   • DIABETIC FOOT EXAM  09/19/2016   • DIABETIC EYE EXAM  06/01/2017   • COLONOSCOPY  09/19/2017 (Originally 9/2/2016)   • INFLUENZA VACCINE  08/01/2017   • HEMOGLOBIN A1C  11/26/2017   • PROSTATE CANCER SCREENING  01/20/2018   • URINE MICROALBUMIN  01/20/2018   • LIPID PANEL  05/26/2018        Subjective   History of Present Illness    Ronald Irizarry is a 67 y.o. male who presents for an Annual Wellness Visit.    The following portions of the patient's history were reviewed and updated as appropriate:   He  has a past medical history of Essential hypertension; GERD (gastroesophageal reflux disease); Hyperlipidemia; Primary osteoarthritis involving multiple joints; and Type 2 diabetes mellitus without complication.  He  does not have any pertinent problems on file.  He  has a past surgical history that  includes Tonsillectomy.  His family history includes Diabetes in his father and mother.  He  reports that he has never smoked. He has never used smokeless tobacco. He reports that he does not drink alcohol or use illicit drugs.  Current Outpatient Prescriptions   Medication Sig Dispense Refill   • glucose blood test strip OneTouch Ultra Test strips - glucose testing strips to test FSBS once daily, as directed.DX:250.00.     • lisinopril (PRINIVIL,ZESTRIL) 10 MG tablet Take 1 tablet by mouth Daily. 90 tablet 1   • metFORMIN ER (GLUCOPHAGE-XR) 500 MG 24 hr tablet 2 tablets po with evening meal 180 tablet 1   • Multiple Vitamins-Minerals (CENTRUM SILVER PO) Take 1 tablet by mouth daily.     • pantoprazole (PROTONIX) 40 MG EC tablet Take 1 tablet by mouth Daily. 90 tablet 1   • pravastatin (PRAVACHOL) 20 MG tablet Take 1 tablet by mouth Every Night. 90 tablet 1   • Vitamin E 400 UNITS tablet Take 1 tablet by mouth daily.       No current facility-administered medications for this visit.      He is allergic to other..    Outpatient Medications Prior to Visit   Medication Sig Dispense Refill   • glucose blood test strip OneTouch Ultra Test strips - glucose testing strips to test FSBS once daily, as directed.DX:250.00.     • metFORMIN ER (GLUCOPHAGE-XR) 500 MG 24 hr tablet 2 tablets po with evening meal 180 tablet 1   • Multiple Vitamins-Minerals (CENTRUM SILVER PO) Take 1 tablet by mouth daily.     • pantoprazole (PROTONIX) 40 MG EC tablet Take 1 tablet by mouth Daily. 90 tablet 1   • Vitamin E 400 UNITS tablet Take 1 tablet by mouth daily.     • lisinopril (PRINIVIL,ZESTRIL) 10 MG tablet Take 1 tablet by mouth  daily 90 tablet 1   • pravastatin (PRAVACHOL) 20 MG tablet Take 1 tablet by mouth at  bedtime 90 tablet 1   • terbinafine (lamiSIL) 250 MG tablet Take 1 tablet by mouth Daily. 30 tablet 2     No facility-administered medications prior to visit.        Patient Active Problem List   Diagnosis   • Type 2 diabetes  "mellitus without complication, without long-term current use of insulin   • Mixed hyperlipidemia   • Essential hypertension   • Primary osteoarthritis involving multiple joints   • Gastroesophageal reflux disease without esophagitis       Advance Care Planning:  has NO advance directive - not interested in additional information    Identification of Risk Factors:  Risk factors include: Diabetes.    Review of Systems    Compared to one year ago, the patient feels his physical health is the same.  Compared to one year ago, the patient feels his mental health is the same.    Objective     Physical Exam    Vitals:    06/02/17 1422   BP: 112/60   BP Location: Left arm   Patient Position: Sitting   Cuff Size: Adult   Pulse: 84   Temp: 98.1 °F (36.7 °C)   TempSrc: Oral   Weight: 148 lb (67.1 kg)   Height: 68\" (172.7 cm)   PainSc: 0-No pain       Body mass index is 22.5 kg/(m^2).  Discussed the patient's BMI with him. The BMI is in the acceptable range.    Assessment/Plan   Patient Self-Management and Personalized Health Advice  The patient has been provided with information about: diet, exercise and weight management and preventive services including:   · Exercise counseling provided, Fall Risk assessment done, Nutrition counseling provided, Pneumococcal vaccine .    Visit Diagnoses:    ICD-10-CM ICD-9-CM   1. Initial Medicare annual wellness visit Z00.00 V70.0   2. Type 2 diabetes mellitus without complication, without long-term current use of insulin E11.9 250.00   3. Essential hypertension I10 401.9   4. Mixed hyperlipidemia E78.2 272.2   5. Gastroesophageal reflux disease without esophagitis K21.9 530.81   6. Primary osteoarthritis involving multiple joints M15.0 715.09   7. Pneumococcal vaccination given Z23 V06.6       Orders Placed This Encounter   Procedures   • Pneumococcal Conjugate Vaccine 13-Valent All   • Comprehensive Metabolic Panel     Standing Status:   Future     Standing Expiration Date:   10/30/2017   • " Hemoglobin A1c     Standing Status:   Future     Standing Expiration Date:   10/30/2017   • LDL Cholesterol, Direct     Standing Status:   Future     Standing Expiration Date:   10/30/2017   • T4, Free     Standing Status:   Future     Standing Expiration Date:   10/30/2017   • TSH     Standing Status:   Future     Standing Expiration Date:   10/30/2017   • Urinalysis With / Culture If Indicated     Standing Status:   Future     Standing Expiration Date:   10/30/2017       Outpatient Encounter Prescriptions as of 6/2/2017   Medication Sig Dispense Refill   • glucose blood test strip OneTouch Ultra Test strips - glucose testing strips to test FSBS once daily, as directed.DX:250.00.     • lisinopril (PRINIVIL,ZESTRIL) 10 MG tablet Take 1 tablet by mouth Daily. 90 tablet 1   • metFORMIN ER (GLUCOPHAGE-XR) 500 MG 24 hr tablet 2 tablets po with evening meal 180 tablet 1   • Multiple Vitamins-Minerals (CENTRUM SILVER PO) Take 1 tablet by mouth daily.     • pantoprazole (PROTONIX) 40 MG EC tablet Take 1 tablet by mouth Daily. 90 tablet 1   • pravastatin (PRAVACHOL) 20 MG tablet Take 1 tablet by mouth Every Night. 90 tablet 1   • Vitamin E 400 UNITS tablet Take 1 tablet by mouth daily.     • [DISCONTINUED] lisinopril (PRINIVIL,ZESTRIL) 10 MG tablet Take 1 tablet by mouth  daily 90 tablet 1   • [DISCONTINUED] pravastatin (PRAVACHOL) 20 MG tablet Take 1 tablet by mouth at  bedtime 90 tablet 1   • [DISCONTINUED] terbinafine (lamiSIL) 250 MG tablet Take 1 tablet by mouth Daily. 30 tablet 2     No facility-administered encounter medications on file as of 6/2/2017.        Reviewed use of high risk medication in the elderly: yes  Reviewed for potential of harmful drug interactions in the elderly: yes    Follow Up:  Return in about 4 months (around 10/2/2017) for Next scheduled follow up, Or sooner as needed, With Labs prior to appointment.     An After Visit Summary and PPPS with all of these plans were given to the patient.

## 2017-06-06 ENCOUNTER — TELEPHONE (OUTPATIENT)
Dept: FAMILY MEDICINE CLINIC | Facility: CLINIC | Age: 67
End: 2017-06-06

## 2017-06-06 NOTE — TELEPHONE ENCOUNTER
----- Message from Kristen Leiva sent at 6/6/2017 11:23 AM CDT -----  Regarding: TEST STRIPS   Patient's needs a script for test strips for [facility] [place].

## 2017-08-15 RX ORDER — PANTOPRAZOLE SODIUM 40 MG/1
TABLET, DELAYED RELEASE ORAL
Qty: 90 TABLET | Refills: 1 | Status: SHIPPED | OUTPATIENT
Start: 2017-08-15 | End: 2018-05-14 | Stop reason: SDUPTHER

## 2017-09-11 DIAGNOSIS — E11.9 TYPE 2 DIABETES MELLITUS WITHOUT COMPLICATION, WITHOUT LONG-TERM CURRENT USE OF INSULIN (HCC): ICD-10-CM

## 2017-09-11 RX ORDER — METFORMIN HYDROCHLORIDE 500 MG/1
TABLET, EXTENDED RELEASE ORAL
Qty: 180 TABLET | Refills: 1 | Status: SHIPPED | OUTPATIENT
Start: 2017-09-11 | End: 2018-03-12 | Stop reason: SDUPTHER

## 2017-09-29 ENCOUNTER — LAB (OUTPATIENT)
Dept: LAB | Facility: OTHER | Age: 67
End: 2017-09-29

## 2017-09-29 DIAGNOSIS — E11.9 TYPE 2 DIABETES MELLITUS WITHOUT COMPLICATION, WITHOUT LONG-TERM CURRENT USE OF INSULIN (HCC): Chronic | ICD-10-CM

## 2017-09-29 DIAGNOSIS — I10 ESSENTIAL HYPERTENSION: ICD-10-CM

## 2017-09-29 DIAGNOSIS — E78.2 MIXED HYPERLIPIDEMIA: Chronic | ICD-10-CM

## 2017-09-29 LAB
ALBUMIN SERPL-MCNC: 4.2 G/DL (ref 3.2–5.5)
ALBUMIN/GLOB SERPL: 1.4 G/DL (ref 1–3)
ALP SERPL-CCNC: 51 U/L (ref 15–121)
ALT SERPL W P-5'-P-CCNC: 18 U/L (ref 10–60)
ANION GAP SERPL CALCULATED.3IONS-SCNC: 10 MMOL/L (ref 5–15)
ARTICHOKE IGE QN: 123 MG/DL (ref 0–129)
AST SERPL-CCNC: 18 U/L (ref 10–60)
BASOPHILS # BLD AUTO: 0.02 10*3/MM3 (ref 0–0.2)
BASOPHILS NFR BLD AUTO: 0.3 % (ref 0–2)
BILIRUB SERPL-MCNC: 0.8 MG/DL (ref 0.2–1)
BILIRUB UR QL STRIP: NEGATIVE
BUN BLD-MCNC: 16 MG/DL (ref 8–25)
BUN/CREAT SERPL: 22.9 (ref 7–25)
CALCIUM SPEC-SCNC: 9.4 MG/DL (ref 8.4–10.8)
CHLORIDE SERPL-SCNC: 100 MMOL/L (ref 100–112)
CLARITY UR: CLEAR
CO2 SERPL-SCNC: 29 MMOL/L (ref 20–32)
COLOR UR: YELLOW
CREAT BLD-MCNC: 0.7 MG/DL (ref 0.4–1.3)
DEPRECATED RDW RBC AUTO: 42 FL (ref 35.1–43.9)
EOSINOPHIL # BLD AUTO: 0.37 10*3/MM3 (ref 0–0.7)
EOSINOPHIL NFR BLD AUTO: 5.6 % (ref 0–7)
ERYTHROCYTE [DISTWIDTH] IN BLOOD BY AUTOMATED COUNT: 12.9 % (ref 11.5–14.5)
GFR SERPL CREATININE-BSD FRML MDRD: 112 ML/MIN/1.73 (ref 49–113)
GLOBULIN UR ELPH-MCNC: 2.9 GM/DL (ref 2.5–4.6)
GLUCOSE BLD-MCNC: 127 MG/DL (ref 70–100)
GLUCOSE UR STRIP-MCNC: NEGATIVE MG/DL
HBA1C MFR BLD: 6.5 % (ref 4–5.6)
HCT VFR BLD AUTO: 44.8 % (ref 39–49)
HGB BLD-MCNC: 14.7 G/DL (ref 13.7–17.3)
HGB UR QL STRIP.AUTO: NEGATIVE
KETONES UR QL STRIP: NEGATIVE
LEUKOCYTE ESTERASE UR QL STRIP.AUTO: NEGATIVE
LYMPHOCYTES # BLD AUTO: 2.56 10*3/MM3 (ref 0.6–4.2)
LYMPHOCYTES NFR BLD AUTO: 39.1 % (ref 10–50)
MCH RBC QN AUTO: 29.6 PG (ref 26.5–34)
MCHC RBC AUTO-ENTMCNC: 32.8 G/DL (ref 31.5–36.3)
MCV RBC AUTO: 90.3 FL (ref 80–98)
MONOCYTES # BLD AUTO: 0.43 10*3/MM3 (ref 0–0.9)
MONOCYTES NFR BLD AUTO: 6.6 % (ref 0–12)
NEUTROPHILS # BLD AUTO: 3.17 10*3/MM3 (ref 2–8.6)
NEUTROPHILS NFR BLD AUTO: 48.4 % (ref 37–80)
NITRITE UR QL STRIP: NEGATIVE
PH UR STRIP.AUTO: 8 [PH] (ref 5.5–8)
PLATELET # BLD AUTO: 253 10*3/MM3 (ref 150–450)
PMV BLD AUTO: 9.2 FL (ref 8–12)
POTASSIUM BLD-SCNC: 4.3 MMOL/L (ref 3.4–5.4)
PROT SERPL-MCNC: 7.1 G/DL (ref 6.7–8.2)
PROT UR QL STRIP: NEGATIVE
RBC # BLD AUTO: 4.96 10*6/MM3 (ref 4.37–5.74)
SODIUM BLD-SCNC: 139 MMOL/L (ref 134–146)
SP GR UR STRIP: 1.01 (ref 1–1.03)
T4 FREE SERPL-MCNC: 1.54 NG/DL (ref 0.78–2.19)
TSH SERPL DL<=0.05 MIU/L-ACNC: 0.92 MIU/ML (ref 0.46–4.68)
UROBILINOGEN UR QL STRIP: NORMAL
WBC NRBC COR # BLD: 6.55 10*3/MM3 (ref 3.2–9.8)

## 2017-09-29 PROCEDURE — 36415 COLL VENOUS BLD VENIPUNCTURE: CPT | Performed by: INTERNAL MEDICINE

## 2017-09-29 PROCEDURE — 84443 ASSAY THYROID STIM HORMONE: CPT | Performed by: INTERNAL MEDICINE

## 2017-09-29 PROCEDURE — 83036 HEMOGLOBIN GLYCOSYLATED A1C: CPT | Performed by: INTERNAL MEDICINE

## 2017-09-29 PROCEDURE — 84439 ASSAY OF FREE THYROXINE: CPT | Performed by: INTERNAL MEDICINE

## 2017-09-29 PROCEDURE — 80053 COMPREHEN METABOLIC PANEL: CPT | Performed by: INTERNAL MEDICINE

## 2017-09-29 PROCEDURE — 81003 URINALYSIS AUTO W/O SCOPE: CPT | Performed by: INTERNAL MEDICINE

## 2017-09-29 PROCEDURE — 85025 COMPLETE CBC W/AUTO DIFF WBC: CPT | Performed by: INTERNAL MEDICINE

## 2017-09-29 PROCEDURE — 83721 ASSAY OF BLOOD LIPOPROTEIN: CPT | Performed by: INTERNAL MEDICINE

## 2017-10-02 ENCOUNTER — OFFICE VISIT (OUTPATIENT)
Dept: FAMILY MEDICINE CLINIC | Facility: CLINIC | Age: 67
End: 2017-10-02

## 2017-10-02 VITALS
WEIGHT: 141 LBS | HEIGHT: 68 IN | DIASTOLIC BLOOD PRESSURE: 56 MMHG | SYSTOLIC BLOOD PRESSURE: 110 MMHG | HEART RATE: 80 BPM | BODY MASS INDEX: 21.37 KG/M2

## 2017-10-02 DIAGNOSIS — Z12.5 SCREENING FOR PROSTATE CANCER: ICD-10-CM

## 2017-10-02 DIAGNOSIS — E11.9 TYPE 2 DIABETES MELLITUS WITHOUT COMPLICATION, WITHOUT LONG-TERM CURRENT USE OF INSULIN (HCC): Primary | Chronic | ICD-10-CM

## 2017-10-02 DIAGNOSIS — E78.2 MIXED HYPERLIPIDEMIA: Chronic | ICD-10-CM

## 2017-10-02 DIAGNOSIS — M15.9 PRIMARY OSTEOARTHRITIS INVOLVING MULTIPLE JOINTS: Chronic | ICD-10-CM

## 2017-10-02 DIAGNOSIS — I10 ESSENTIAL HYPERTENSION: Chronic | ICD-10-CM

## 2017-10-02 DIAGNOSIS — K21.9 GASTROESOPHAGEAL REFLUX DISEASE WITHOUT ESOPHAGITIS: Chronic | ICD-10-CM

## 2017-10-02 PROCEDURE — 99214 OFFICE O/P EST MOD 30 MIN: CPT | Performed by: INTERNAL MEDICINE

## 2017-12-12 DIAGNOSIS — I10 ESSENTIAL HYPERTENSION: Chronic | ICD-10-CM

## 2017-12-12 DIAGNOSIS — E78.2 MIXED HYPERLIPIDEMIA: Chronic | ICD-10-CM

## 2017-12-12 RX ORDER — PRAVASTATIN SODIUM 20 MG
TABLET ORAL
Qty: 90 TABLET | Refills: 1 | Status: SHIPPED | OUTPATIENT
Start: 2017-12-12 | End: 2018-06-11 | Stop reason: SDUPTHER

## 2017-12-12 RX ORDER — LISINOPRIL 10 MG/1
TABLET ORAL
Qty: 90 TABLET | Refills: 1 | Status: SHIPPED | OUTPATIENT
Start: 2017-12-12 | End: 2018-06-11 | Stop reason: SDUPTHER

## 2018-01-24 ENCOUNTER — LAB (OUTPATIENT)
Dept: LAB | Facility: OTHER | Age: 68
End: 2018-01-24

## 2018-01-24 DIAGNOSIS — E11.9 TYPE 2 DIABETES MELLITUS WITHOUT COMPLICATION, WITHOUT LONG-TERM CURRENT USE OF INSULIN (HCC): Chronic | ICD-10-CM

## 2018-01-24 DIAGNOSIS — I10 ESSENTIAL HYPERTENSION: ICD-10-CM

## 2018-01-24 DIAGNOSIS — E78.2 MIXED HYPERLIPIDEMIA: Chronic | ICD-10-CM

## 2018-01-24 DIAGNOSIS — Z12.5 SCREENING FOR PROSTATE CANCER: ICD-10-CM

## 2018-01-24 LAB
ALBUMIN SERPL-MCNC: 4 G/DL (ref 3.2–5.5)
ALBUMIN/GLOB SERPL: 1.3 G/DL (ref 1–3)
ALP SERPL-CCNC: 58 U/L (ref 15–121)
ALT SERPL W P-5'-P-CCNC: 14 U/L (ref 10–60)
ANION GAP SERPL CALCULATED.3IONS-SCNC: 8 MMOL/L (ref 5–15)
AST SERPL-CCNC: 18 U/L (ref 10–60)
BASOPHILS # BLD AUTO: 0.02 10*3/MM3 (ref 0–0.2)
BASOPHILS NFR BLD AUTO: 0.4 % (ref 0–2)
BILIRUB SERPL-MCNC: 1 MG/DL (ref 0.2–1)
BILIRUB UR QL STRIP: NEGATIVE
BUN BLD-MCNC: 15 MG/DL (ref 8–25)
BUN/CREAT SERPL: 18.8 (ref 7–25)
CALCIUM SPEC-SCNC: 9.2 MG/DL (ref 8.4–10.8)
CHLORIDE SERPL-SCNC: 99 MMOL/L (ref 100–112)
CHOLEST SERPL-MCNC: 174 MG/DL (ref 150–200)
CLARITY UR: CLEAR
CO2 SERPL-SCNC: 30 MMOL/L (ref 20–32)
COLOR UR: YELLOW
CREAT BLD-MCNC: 0.8 MG/DL (ref 0.4–1.3)
DEPRECATED RDW RBC AUTO: 40.6 FL (ref 35.1–43.9)
EOSINOPHIL # BLD AUTO: 0.22 10*3/MM3 (ref 0–0.7)
EOSINOPHIL NFR BLD AUTO: 4.2 % (ref 0–7)
ERYTHROCYTE [DISTWIDTH] IN BLOOD BY AUTOMATED COUNT: 12.8 % (ref 11.5–14.5)
GFR SERPL CREATININE-BSD FRML MDRD: 96 ML/MIN/1.73 (ref 49–113)
GLOBULIN UR ELPH-MCNC: 3.2 GM/DL (ref 2.5–4.6)
GLUCOSE BLD-MCNC: 123 MG/DL (ref 70–100)
GLUCOSE UR STRIP-MCNC: NEGATIVE MG/DL
HBA1C MFR BLD: 6.9 % (ref 4–5.6)
HCT VFR BLD AUTO: 43.6 % (ref 39–49)
HDLC SERPL-MCNC: 44 MG/DL (ref 35–100)
HGB BLD-MCNC: 14.5 G/DL (ref 13.7–17.3)
HGB UR QL STRIP.AUTO: NEGATIVE
KETONES UR QL STRIP: NEGATIVE
LDLC SERPL CALC-MCNC: 114 MG/DL
LDLC/HDLC SERPL: 2.59 {RATIO}
LEUKOCYTE ESTERASE UR QL STRIP.AUTO: NEGATIVE
LYMPHOCYTES # BLD AUTO: 1.93 10*3/MM3 (ref 0.6–4.2)
LYMPHOCYTES NFR BLD AUTO: 36.8 % (ref 10–50)
MCH RBC QN AUTO: 29.4 PG (ref 26.5–34)
MCHC RBC AUTO-ENTMCNC: 33.3 G/DL (ref 31.5–36.3)
MCV RBC AUTO: 88.4 FL (ref 80–98)
MONOCYTES # BLD AUTO: 0.45 10*3/MM3 (ref 0–0.9)
MONOCYTES NFR BLD AUTO: 8.6 % (ref 0–12)
NEUTROPHILS # BLD AUTO: 2.63 10*3/MM3 (ref 2–8.6)
NEUTROPHILS NFR BLD AUTO: 50 % (ref 37–80)
NITRITE UR QL STRIP: NEGATIVE
PH UR STRIP.AUTO: 8 [PH] (ref 5.5–8)
PLATELET # BLD AUTO: 279 10*3/MM3 (ref 150–450)
PMV BLD AUTO: 8.6 FL (ref 8–12)
POTASSIUM BLD-SCNC: 4.2 MMOL/L (ref 3.4–5.4)
PROT SERPL-MCNC: 7.2 G/DL (ref 6.7–8.2)
PROT UR QL STRIP: NEGATIVE
PSA SERPL-MCNC: 1.3 NG/ML (ref 0–4)
RBC # BLD AUTO: 4.93 10*6/MM3 (ref 4.37–5.74)
SODIUM BLD-SCNC: 137 MMOL/L (ref 134–146)
SP GR UR STRIP: 1.01 (ref 1–1.03)
T4 FREE SERPL-MCNC: 1.33 NG/DL (ref 0.78–2.19)
TRIGL SERPL-MCNC: 80 MG/DL (ref 35–160)
TSH SERPL DL<=0.05 MIU/L-ACNC: 1.07 MIU/ML (ref 0.46–4.68)
UROBILINOGEN UR QL STRIP: NORMAL
VLDLC SERPL-MCNC: 16 MG/DL
WBC NRBC COR # BLD: 5.25 10*3/MM3 (ref 3.2–9.8)

## 2018-01-24 PROCEDURE — 36415 COLL VENOUS BLD VENIPUNCTURE: CPT | Performed by: INTERNAL MEDICINE

## 2018-01-24 PROCEDURE — G0103 PSA SCREENING: HCPCS | Performed by: INTERNAL MEDICINE

## 2018-01-24 PROCEDURE — 80053 COMPREHEN METABOLIC PANEL: CPT | Performed by: INTERNAL MEDICINE

## 2018-01-24 PROCEDURE — 85025 COMPLETE CBC W/AUTO DIFF WBC: CPT | Performed by: INTERNAL MEDICINE

## 2018-01-24 PROCEDURE — 80061 LIPID PANEL: CPT | Performed by: INTERNAL MEDICINE

## 2018-01-24 PROCEDURE — 84443 ASSAY THYROID STIM HORMONE: CPT | Performed by: INTERNAL MEDICINE

## 2018-01-24 PROCEDURE — 81003 URINALYSIS AUTO W/O SCOPE: CPT | Performed by: INTERNAL MEDICINE

## 2018-01-24 PROCEDURE — 83036 HEMOGLOBIN GLYCOSYLATED A1C: CPT | Performed by: INTERNAL MEDICINE

## 2018-01-24 PROCEDURE — 84439 ASSAY OF FREE THYROXINE: CPT | Performed by: INTERNAL MEDICINE

## 2018-02-02 ENCOUNTER — OFFICE VISIT (OUTPATIENT)
Dept: FAMILY MEDICINE CLINIC | Facility: CLINIC | Age: 68
End: 2018-02-02

## 2018-02-02 VITALS
HEIGHT: 68 IN | BODY MASS INDEX: 21.52 KG/M2 | WEIGHT: 142 LBS | HEART RATE: 78 BPM | SYSTOLIC BLOOD PRESSURE: 120 MMHG | DIASTOLIC BLOOD PRESSURE: 64 MMHG | TEMPERATURE: 97.1 F

## 2018-02-02 DIAGNOSIS — I10 ESSENTIAL HYPERTENSION: Chronic | ICD-10-CM

## 2018-02-02 DIAGNOSIS — E78.2 MIXED HYPERLIPIDEMIA: Chronic | ICD-10-CM

## 2018-02-02 DIAGNOSIS — E11.9 TYPE 2 DIABETES MELLITUS WITHOUT COMPLICATION, WITHOUT LONG-TERM CURRENT USE OF INSULIN (HCC): Primary | Chronic | ICD-10-CM

## 2018-02-02 DIAGNOSIS — K21.9 GASTROESOPHAGEAL REFLUX DISEASE WITHOUT ESOPHAGITIS: Chronic | ICD-10-CM

## 2018-02-02 PROCEDURE — 99214 OFFICE O/P EST MOD 30 MIN: CPT | Performed by: INTERNAL MEDICINE

## 2018-02-02 NOTE — PROGRESS NOTES
"Chief Complaint   Patient presents with   • Diabetes   • Hyperlipidemia   • Hypertension     Subjective   Ronald Irizarry is a 68 y.o. male who presents to the office for follow-up and review of labs. He has diabetes and takes his medication as directed.  His blood sugar has been controlled, and he has been compliant with his diabetic diet.  He monitors his blood sugar one time daily.  He has hyperlipidemia and takes pravastatin daily.  He has hypertension and his blood pressure has been well controlled.  He has osteoarthritis and takes over-the-counter NSAIDs as needed.  He takes Protonix daily for treatment of GERD.    The following portions of the patient's history were reviewed and updated as appropriate: allergies, current medications, past family history, past medical history, past social history, past surgical history and problem list.    Review of Systems   Constitutional: Negative for chills, fatigue and fever.   HENT: Negative for congestion, sneezing, sore throat and trouble swallowing.    Eyes: Negative for visual disturbance.   Respiratory: Negative for cough, chest tightness, shortness of breath and wheezing.    Cardiovascular: Negative for chest pain, palpitations and leg swelling.   Gastrointestinal: Negative for abdominal pain, constipation, diarrhea, nausea and vomiting.   Genitourinary: Negative for dysuria, frequency and urgency.   Musculoskeletal: Negative for neck pain.   Skin: Negative for rash.   Neurological: Negative for dizziness, weakness and headaches.   Psychiatric/Behavioral:        Patient denies any feelings of depression and has not felt down, hopeless or lost interest in any activities.   All other systems reviewed and are negative.      Objective   Vitals:    02/02/18 1348   BP: 120/64   BP Location: Left arm   Patient Position: Sitting   Cuff Size: Adult   Pulse: 78   Temp: 97.1 °F (36.2 °C)   TempSrc: Oral   Weight: 64.4 kg (142 lb)   Height: 172.7 cm (68\")   PainSc: 0-No pain "     Physical Exam   Constitutional: He is oriented to person, place, and time. He appears well-developed and well-nourished. No distress.   HENT:   Head: Normocephalic and atraumatic.   Nose: Nose normal.   Mouth/Throat: Oropharynx is clear and moist. No oropharyngeal exudate.   Eyes: Conjunctivae and EOM are normal. Pupils are equal, round, and reactive to light. No scleral icterus.   Neck: Normal range of motion. Neck supple.   Cardiovascular: Normal rate, regular rhythm and normal heart sounds.  Exam reveals no gallop and no friction rub.    No murmur heard.  Pulmonary/Chest: Effort normal and breath sounds normal. No respiratory distress. He has no wheezes. He has no rales.   Abdominal: Soft. Bowel sounds are normal. He exhibits no distension. There is no tenderness. There is no rebound and no guarding.   Musculoskeletal: Normal range of motion. He exhibits no edema.   Lymphadenopathy:     He has no cervical adenopathy.   Neurological: He is alert and oriented to person, place, and time. No cranial nerve deficit.   Skin: Skin is warm and dry. No rash noted.   Psychiatric: He has a normal mood and affect. His behavior is normal. Judgment and thought content normal.   Nursing note and vitals reviewed.      Assessment/Plan   Ronald was seen today for diabetes, hyperlipidemia and hypertension.    Diagnoses and all orders for this visit:    Type 2 diabetes mellitus without complication, without long-term current use of insulin  -     Hemoglobin A1c; Future  -     Microalbumin / Creatinine Urine Ratio - Urine, Clean Catch; Future    Essential hypertension  -     CBC & Differential; Future  -     Comprehensive Metabolic Panel; Future  -     Urinalysis With / Culture If Indicated - Urine, Clean Catch; Future    Mixed hyperlipidemia  -     LDL Cholesterol, Direct; Future    Gastroesophageal reflux disease without esophagitis         Labs are reviewed with patient. His glucose and hemoglobin A1c show good control of his  diabetes.  He will continue with his current diabetic medications and may monitor blood sugar one time daily.  His LDL is controlled and he will continue with pravastatin.  His blood pressure is well controlled and he will continue with lisinopril.  He will continue with Protonix for treatment of his GERD.    PHQ-2/PHQ-9 Depression Screening 2/2/2018   Little interest or pleasure in doing things 0   Feeling down, depressed, or hopeless 0   Trouble falling or staying asleep, or sleeping too much 0   Feeling tired or having little energy 0   Poor appetite or overeating 0   Feeling bad about yourself - or that you are a failure or have let yourself or your family down 0   Trouble concentrating on things, such as reading the newspaper or watching television 0   Moving or speaking so slowly that other people could have noticed. Or the opposite - being so fidgety or restless that you have been moving around a lot more than usual 0   Thoughts that you would be better off dead, or of hurting yourself in some way 0   Total Score 0         Lab on 01/24/2018   Component Date Value Ref Range Status   • Glucose 01/24/2018 123* 70 - 100 mg/dL Final   • BUN 01/24/2018 15  8 - 25 mg/dL Final   • Creatinine 01/24/2018 0.80  0.40 - 1.30 mg/dL Final   • Sodium 01/24/2018 137  134 - 146 mmol/L Final   • Potassium 01/24/2018 4.2  3.4 - 5.4 mmol/L Final   • Chloride 01/24/2018 99* 100 - 112 mmol/L Final   • CO2 01/24/2018 30.0  20.0 - 32.0 mmol/L Final   • Calcium 01/24/2018 9.2  8.4 - 10.8 mg/dL Final   • Total Protein 01/24/2018 7.2  6.7 - 8.2 g/dL Final   • Albumin 01/24/2018 4.00  3.20 - 5.50 g/dL Final   • ALT (SGPT) 01/24/2018 14  10 - 60 U/L Final   • AST (SGOT) 01/24/2018 18  10 - 60 U/L Final   • Alkaline Phosphatase 01/24/2018 58  15 - 121 U/L Final   • Total Bilirubin 01/24/2018 1.0  0.2 - 1.0 mg/dL Final   • eGFR Non  Amer 01/24/2018 96  49 - 113 mL/min/1.73 Final   • Globulin 01/24/2018 3.2  2.5 - 4.6 gm/dL Final   •  A/G Ratio 01/24/2018 1.3  1.0 - 3.0 g/dL Final   • BUN/Creatinine Ratio 01/24/2018 18.8  7.0 - 25.0 Final   • Anion Gap 01/24/2018 8.0  5.0 - 15.0 mmol/L Final   • Hemoglobin A1C 01/24/2018 6.9* 4 - 5.6 % Final   • Total Cholesterol 01/24/2018 174  150 - 200 mg/dL Final   • Triglycerides 01/24/2018 80  35 - 160 mg/dL Final   • HDL Cholesterol 01/24/2018 44  35 - 100 mg/dL Final   • LDL Cholesterol  01/24/2018 114  mg/dL Final   • VLDL Cholesterol 01/24/2018 16  mg/dL Final   • LDL/HDL Ratio 01/24/2018 2.59   Final   • PSA 01/24/2018 1.300  0.000 - 4.000 ng/mL Final   • Free T4 01/24/2018 1.33  0.78 - 2.19 ng/dL Final   • TSH 01/24/2018 1.070  0.460 - 4.680 mIU/mL Final   • Color, UA 01/24/2018 Yellow  Yellow, Straw Final   • Appearance, UA 01/24/2018 Clear  Clear Final   • pH, UA 01/24/2018 8.0  5.5 - 8.0 Final   • Specific Gravity, UA 01/24/2018 1.015  1.005 - 1.030 Final   • Glucose, UA 01/24/2018 Negative  Negative Final   • Ketones, UA 01/24/2018 Negative  Negative Final   • Bilirubin, UA 01/24/2018 Negative  Negative Final   • Blood, UA 01/24/2018 Negative  Negative Final   • Protein, UA 01/24/2018 Negative  Negative Final   • Leuk Esterase, UA 01/24/2018 Negative  Negative Final   • Nitrite, UA 01/24/2018 Negative  Negative Final   • Urobilinogen, UA 01/24/2018 0.2 E.U./dL  0.2 - 1.0 E.U./dL Final   • WBC 01/24/2018 5.25  3.20 - 9.80 10*3/mm3 Final   • RBC 01/24/2018 4.93  4.37 - 5.74 10*6/mm3 Final   • Hemoglobin 01/24/2018 14.5  13.7 - 17.3 g/dL Final   • Hematocrit 01/24/2018 43.6  39.0 - 49.0 % Final   • MCV 01/24/2018 88.4  80.0 - 98.0 fL Final   • MCH 01/24/2018 29.4  26.5 - 34.0 pg Final   • MCHC 01/24/2018 33.3  31.5 - 36.3 g/dL Final   • RDW 01/24/2018 12.8  11.5 - 14.5 % Final   • RDW-SD 01/24/2018 40.6  35.1 - 43.9 fl Final   • MPV 01/24/2018 8.6  8.0 - 12.0 fL Final   • Platelets 01/24/2018 279  150 - 450 10*3/mm3 Final   • Neutrophil % 01/24/2018 50.0  37.0 - 80.0 % Final   • Lymphocyte %  01/24/2018 36.8  10.0 - 50.0 % Final   • Monocyte % 01/24/2018 8.6  0.0 - 12.0 % Final   • Eosinophil % 01/24/2018 4.2  0.0 - 7.0 % Final   • Basophil % 01/24/2018 0.4  0.0 - 2.0 % Final   • Neutrophils, Absolute 01/24/2018 2.63  2.00 - 8.60 10*3/mm3 Final   • Lymphocytes, Absolute 01/24/2018 1.93  0.60 - 4.20 10*3/mm3 Final   • Monocytes, Absolute 01/24/2018 0.45  0.00 - 0.90 10*3/mm3 Final   • Eosinophils, Absolute 01/24/2018 0.22  0.00 - 0.70 10*3/mm3 Final   • Basophils, Absolute 01/24/2018 0.02  0.00 - 0.20 10*3/mm3 Final   ]

## 2018-03-12 DIAGNOSIS — E11.9 TYPE 2 DIABETES MELLITUS WITHOUT COMPLICATION, WITHOUT LONG-TERM CURRENT USE OF INSULIN (HCC): ICD-10-CM

## 2018-03-12 RX ORDER — METFORMIN HYDROCHLORIDE 500 MG/1
TABLET, EXTENDED RELEASE ORAL
Qty: 180 TABLET | Refills: 1 | Status: SHIPPED | OUTPATIENT
Start: 2018-03-12 | End: 2018-10-12 | Stop reason: SDUPTHER

## 2018-04-09 PROBLEM — IMO0001 NO DIABETIC RETINOPATHY IN BOTH EYES: Chronic | Status: ACTIVE | Noted: 2018-04-09

## 2018-05-14 RX ORDER — PANTOPRAZOLE SODIUM 40 MG/1
TABLET, DELAYED RELEASE ORAL
Qty: 90 TABLET | Refills: 10 | Status: SHIPPED | OUTPATIENT
Start: 2018-05-14 | End: 2019-06-10 | Stop reason: SDUPTHER

## 2018-05-29 ENCOUNTER — LAB (OUTPATIENT)
Dept: LAB | Facility: OTHER | Age: 68
End: 2018-05-29

## 2018-05-29 DIAGNOSIS — E11.9 TYPE 2 DIABETES MELLITUS WITHOUT COMPLICATION, WITHOUT LONG-TERM CURRENT USE OF INSULIN (HCC): Chronic | ICD-10-CM

## 2018-05-29 DIAGNOSIS — E78.2 MIXED HYPERLIPIDEMIA: Chronic | ICD-10-CM

## 2018-05-29 DIAGNOSIS — I10 ESSENTIAL HYPERTENSION: ICD-10-CM

## 2018-05-29 LAB
ALBUMIN SERPL-MCNC: 4.1 G/DL (ref 3.2–5.5)
ALBUMIN UR-MCNC: <0.6 MG/L
ALBUMIN/GLOB SERPL: 1.5 G/DL (ref 1–3)
ALP SERPL-CCNC: 45 U/L (ref 15–121)
ALT SERPL W P-5'-P-CCNC: 17 U/L (ref 10–60)
ANION GAP SERPL CALCULATED.3IONS-SCNC: 11 MMOL/L (ref 5–15)
ARTICHOKE IGE QN: 109 MG/DL (ref 0–129)
AST SERPL-CCNC: 17 U/L (ref 10–60)
BASOPHILS # BLD AUTO: 0.01 10*3/MM3 (ref 0–0.2)
BASOPHILS NFR BLD AUTO: 0.2 % (ref 0–2)
BILIRUB SERPL-MCNC: 0.6 MG/DL (ref 0.2–1)
BILIRUB UR QL STRIP: NEGATIVE
BUN BLD-MCNC: 15 MG/DL (ref 8–25)
BUN/CREAT SERPL: 21.4 (ref 7–25)
CALCIUM SPEC-SCNC: 9.1 MG/DL (ref 8.4–10.8)
CHLORIDE SERPL-SCNC: 100 MMOL/L (ref 100–112)
CLARITY UR: ABNORMAL
CO2 SERPL-SCNC: 27 MMOL/L (ref 20–32)
COLOR UR: YELLOW
CREAT BLD-MCNC: 0.7 MG/DL (ref 0.4–1.3)
CREAT UR-MCNC: 78.7 MG/DL
DEPRECATED RDW RBC AUTO: 41.2 FL (ref 35.1–43.9)
EOSINOPHIL # BLD AUTO: 0.36 10*3/MM3 (ref 0–0.7)
EOSINOPHIL NFR BLD AUTO: 6 % (ref 0–7)
ERYTHROCYTE [DISTWIDTH] IN BLOOD BY AUTOMATED COUNT: 12.6 % (ref 11.5–14.5)
GFR SERPL CREATININE-BSD FRML MDRD: 112 ML/MIN/1.73 (ref 49–113)
GLOBULIN UR ELPH-MCNC: 2.8 GM/DL (ref 2.5–4.6)
GLUCOSE BLD-MCNC: 119 MG/DL (ref 70–100)
GLUCOSE UR STRIP-MCNC: NEGATIVE MG/DL
HCT VFR BLD AUTO: 44.1 % (ref 39–49)
HGB BLD-MCNC: 14.4 G/DL (ref 13.7–17.3)
HGB UR QL STRIP.AUTO: NEGATIVE
KETONES UR QL STRIP: NEGATIVE
LEUKOCYTE ESTERASE UR QL STRIP.AUTO: NEGATIVE
LYMPHOCYTES # BLD AUTO: 2.68 10*3/MM3 (ref 0.6–4.2)
LYMPHOCYTES NFR BLD AUTO: 44.7 % (ref 10–50)
MCH RBC QN AUTO: 29.6 PG (ref 26.5–34)
MCHC RBC AUTO-ENTMCNC: 32.7 G/DL (ref 31.5–36.3)
MCV RBC AUTO: 90.7 FL (ref 80–98)
MICROALBUMIN/CREAT UR: NORMAL MG/G (ref 0–30)
MONOCYTES # BLD AUTO: 0.36 10*3/MM3 (ref 0–0.9)
MONOCYTES NFR BLD AUTO: 6 % (ref 0–12)
NEUTROPHILS # BLD AUTO: 2.58 10*3/MM3 (ref 2–8.6)
NEUTROPHILS NFR BLD AUTO: 43.1 % (ref 37–80)
NITRITE UR QL STRIP: NEGATIVE
PH UR STRIP.AUTO: 7.5 [PH] (ref 5.5–8)
PLATELET # BLD AUTO: 237 10*3/MM3 (ref 150–450)
PMV BLD AUTO: 8.8 FL (ref 8–12)
POTASSIUM BLD-SCNC: 4 MMOL/L (ref 3.4–5.4)
PROT SERPL-MCNC: 6.9 G/DL (ref 6.7–8.2)
PROT UR QL STRIP: NEGATIVE
RBC # BLD AUTO: 4.86 10*6/MM3 (ref 4.37–5.74)
SODIUM BLD-SCNC: 138 MMOL/L (ref 134–146)
SP GR UR STRIP: 1.01 (ref 1–1.03)
UROBILINOGEN UR QL STRIP: ABNORMAL
WBC NRBC COR # BLD: 5.99 10*3/MM3 (ref 3.2–9.8)

## 2018-05-29 PROCEDURE — 81003 URINALYSIS AUTO W/O SCOPE: CPT | Performed by: INTERNAL MEDICINE

## 2018-05-29 PROCEDURE — 83721 ASSAY OF BLOOD LIPOPROTEIN: CPT | Performed by: INTERNAL MEDICINE

## 2018-05-29 PROCEDURE — 80053 COMPREHEN METABOLIC PANEL: CPT | Performed by: INTERNAL MEDICINE

## 2018-05-29 PROCEDURE — 82570 ASSAY OF URINE CREATININE: CPT | Performed by: INTERNAL MEDICINE

## 2018-05-29 PROCEDURE — 85025 COMPLETE CBC W/AUTO DIFF WBC: CPT | Performed by: INTERNAL MEDICINE

## 2018-05-29 PROCEDURE — 82043 UR ALBUMIN QUANTITATIVE: CPT | Performed by: INTERNAL MEDICINE

## 2018-05-29 PROCEDURE — 83036 HEMOGLOBIN GLYCOSYLATED A1C: CPT | Performed by: INTERNAL MEDICINE

## 2018-05-29 PROCEDURE — 36415 COLL VENOUS BLD VENIPUNCTURE: CPT | Performed by: INTERNAL MEDICINE

## 2018-05-30 LAB — HBA1C MFR BLD: 6.6 % (ref 4–5.6)

## 2018-06-08 DIAGNOSIS — E78.2 MIXED HYPERLIPIDEMIA: Chronic | ICD-10-CM

## 2018-06-08 DIAGNOSIS — I10 ESSENTIAL HYPERTENSION: Chronic | ICD-10-CM

## 2018-06-08 RX ORDER — LISINOPRIL 10 MG/1
TABLET ORAL
Qty: 90 TABLET | Refills: 1 | OUTPATIENT
Start: 2018-06-08

## 2018-06-08 RX ORDER — PRAVASTATIN SODIUM 20 MG
TABLET ORAL
Qty: 90 TABLET | Refills: 1 | OUTPATIENT
Start: 2018-06-08

## 2018-06-11 ENCOUNTER — OFFICE VISIT (OUTPATIENT)
Dept: FAMILY MEDICINE CLINIC | Facility: CLINIC | Age: 68
End: 2018-06-11

## 2018-06-11 VITALS
DIASTOLIC BLOOD PRESSURE: 62 MMHG | HEART RATE: 80 BPM | HEIGHT: 68 IN | SYSTOLIC BLOOD PRESSURE: 104 MMHG | WEIGHT: 144 LBS | BODY MASS INDEX: 21.82 KG/M2

## 2018-06-11 DIAGNOSIS — Z00.00 MEDICARE ANNUAL WELLNESS VISIT, SUBSEQUENT: Primary | ICD-10-CM

## 2018-06-11 DIAGNOSIS — IMO0001 NO DIABETIC RETINOPATHY IN BOTH EYES: Chronic | ICD-10-CM

## 2018-06-11 DIAGNOSIS — K21.9 GASTROESOPHAGEAL REFLUX DISEASE WITHOUT ESOPHAGITIS: Chronic | ICD-10-CM

## 2018-06-11 DIAGNOSIS — I10 ESSENTIAL HYPERTENSION: Chronic | ICD-10-CM

## 2018-06-11 DIAGNOSIS — E11.9 TYPE 2 DIABETES MELLITUS WITHOUT COMPLICATION, WITHOUT LONG-TERM CURRENT USE OF INSULIN (HCC): Chronic | ICD-10-CM

## 2018-06-11 DIAGNOSIS — E78.2 MIXED HYPERLIPIDEMIA: Chronic | ICD-10-CM

## 2018-06-11 DIAGNOSIS — M15.9 PRIMARY OSTEOARTHRITIS INVOLVING MULTIPLE JOINTS: Chronic | ICD-10-CM

## 2018-06-11 DIAGNOSIS — Z23 PNEUMOCOCCAL VACCINATION GIVEN: ICD-10-CM

## 2018-06-11 PROCEDURE — G0009 ADMIN PNEUMOCOCCAL VACCINE: HCPCS | Performed by: INTERNAL MEDICINE

## 2018-06-11 PROCEDURE — G0439 PPPS, SUBSEQ VISIT: HCPCS | Performed by: INTERNAL MEDICINE

## 2018-06-11 PROCEDURE — 99214 OFFICE O/P EST MOD 30 MIN: CPT | Performed by: INTERNAL MEDICINE

## 2018-06-11 PROCEDURE — 90732 PPSV23 VACC 2 YRS+ SUBQ/IM: CPT | Performed by: INTERNAL MEDICINE

## 2018-06-11 RX ORDER — PRAVASTATIN SODIUM 20 MG
20 TABLET ORAL NIGHTLY
Qty: 90 TABLET | Refills: 1 | Status: SHIPPED | OUTPATIENT
Start: 2018-06-11 | End: 2018-10-12 | Stop reason: SDUPTHER

## 2018-06-11 RX ORDER — LISINOPRIL 10 MG/1
10 TABLET ORAL DAILY
Qty: 90 TABLET | Refills: 1 | Status: SHIPPED | OUTPATIENT
Start: 2018-06-11 | End: 2018-10-12 | Stop reason: SDUPTHER

## 2018-06-11 NOTE — PROGRESS NOTES
QUICK REFERENCE INFORMATION:  The ABCs of the Annual Wellness Visit    Subsequent Medicare Wellness Visit    HEALTH RISK ASSESSMENT    1950    Recent Hospitalizations:  No hospitalization(s) within the last year..        Current Medical Providers:  Patient Care Team:  Anselmo Manriquez MD as PCP - General (Family Medicine)  Higinio Sanders (Optometry)        Smoking Status:  History   Smoking Status   • Never Smoker   Smokeless Tobacco   • Never Used       Alcohol Consumption:  History   Alcohol Use No       Depression Screen:   PHQ-2/PHQ-9 Depression Screening 6/11/2018   Little interest or pleasure in doing things 0   Feeling down, depressed, or hopeless 0   Trouble falling or staying asleep, or sleeping too much -   Feeling tired or having little energy -   Poor appetite or overeating -   Feeling bad about yourself - or that you are a failure or have let yourself or your family down -   Trouble concentrating on things, such as reading the newspaper or watching television -   Moving or speaking so slowly that other people could have noticed. Or the opposite - being so fidgety or restless that you have been moving around a lot more than usual -   Thoughts that you would be better off dead, or of hurting yourself in some way -   Total Score 0       Health Habits and Functional and Cognitive Screening:  Functional & Cognitive Status 6/11/2018   Do you have difficulty preparing food and eating? No   Do you have difficulty bathing yourself, getting dressed or grooming yourself? No   Do you have difficulty using the toilet? No   Do you have difficulty moving around from place to place? No   Do you have trouble with steps or getting out of a bed or a chair? No   In the past year have you fallen or experienced a near fall? No   Current Diet Well Balanced Diet   Dental Exam Up to date   Eye Exam Up to date   Exercise (times per week) 4 times per week   Current Exercise Activities Include Housecleaning   Do you need help  using the phone?  No   Are you deaf or do you have serious difficulty hearing?  No   Do you need help with transportation? No   Do you need help shopping? No   Do you need help preparing meals?  No   Do you need help with housework?  No   Do you need help with laundry? No   Do you need help taking your medications? No   Do you need help managing money? No   Do you ever drive or ride in a car without wearing a seat belt? No   Have you felt unusual stress, anger or loneliness in the last month? No   Who do you live with? Spouse   If you need help, do you have trouble finding someone available to you? No   Have you been bothered in the last four weeks by sexual problems? No   Do you have difficulty concentrating, remembering or making decisions? No           Does the patient have evidence of cognitive impairment? No    Aspirin use counseling: Does not take ASA      Recent Lab Results:  CMP:  Lab Results   Component Value Date    BUN 15 05/29/2018    CREATININE 0.70 05/29/2018    EGFRIFNONA 112 05/29/2018    BCR 21.4 05/29/2018     05/29/2018    K 4.0 05/29/2018    CO2 27.0 05/29/2018    CALCIUM 9.1 05/29/2018    ALBUMIN 4.10 05/29/2018    LABIL2 1.5 05/29/2018    BILITOT 0.6 05/29/2018    ALKPHOS 45 05/29/2018    AST 17 05/29/2018    ALT 17 05/29/2018     Lipid Panel:  Lab Results   Component Value Date    CHOL 174 01/24/2018    TRIG 80 01/24/2018    HDL 44 01/24/2018    VLDL 16 01/24/2018    LDLHDL 2.59 01/24/2018     HbA1c:  Lab Results   Component Value Date    HGBA1C 6.6 (H) 05/29/2018       Visual Acuity:  No exam data present    Age-appropriate Screening Schedule:  Refer to the list below for future screening recommendations based on patient's age, sex and/or medical conditions. Orders for these recommended tests are listed in the plan section. The patient has been provided with a written plan.    Health Maintenance   Topic Date Due   • TDAP/TD VACCINES (1 - Tdap) 01/31/1968   • ZOSTER VACCINE (1 of 2)  01/31/1999   • PNEUMOCOCCAL VACCINES (65+ LOW/MEDIUM RISK) (2 of 2 - PPSV23) 06/02/2018   • DIABETIC FOOT EXAM  06/02/2018   • COLONOSCOPY  10/02/2018 (Originally 9/2/2016)   • INFLUENZA VACCINE  08/01/2018   • HEMOGLOBIN A1C  11/29/2018   • PROSTATE CANCER SCREENING  01/24/2019   • DIABETIC EYE EXAM  04/06/2019   • LIPID PANEL  05/29/2019   • URINE MICROALBUMIN  05/29/2019        Subjective   History of Present Illness    Ronald Irizarry is a 68 y.o. male who presents for an Subsequent Wellness Visit.    The following portions of the patient's history were reviewed and updated as appropriate: allergies, current medications, past family history, past medical history, past social history, past surgical history and problem list.    Outpatient Medications Prior to Visit   Medication Sig Dispense Refill   • glucose blood test strip OneTouch Ultra Test strips - glucose testing strips to test FSBS once daily, as directed.DX:250.00. 90 each 3   • metFORMIN ER (GLUCOPHAGE-XR) 500 MG 24 hr tablet TAKE 2 TABLETS BY MOUTH EVERY EVENING 180 tablet 1   • Multiple Vitamins-Minerals (CENTRUM SILVER PO) Take 1 tablet by mouth daily.     • pantoprazole (PROTONIX) 40 MG EC tablet TAKE 1 TABLET BY MOUTH DAILY. 90 tablet 10   • Vitamin E 400 UNITS tablet Take 1 tablet by mouth daily.     • lisinopril (PRINIVIL,ZESTRIL) 10 MG tablet TAKE 1 TABLET BY MOUTH DAILY. 90 tablet 1   • pravastatin (PRAVACHOL) 20 MG tablet TAKE 1 TABLET BY MOUTH EVERY NIGHT. 90 tablet 1     No facility-administered medications prior to visit.        Patient Active Problem List   Diagnosis   • Type 2 diabetes mellitus without complication, without long-term current use of insulin   • Mixed hyperlipidemia   • Essential hypertension   • Primary osteoarthritis involving multiple joints   • Gastroesophageal reflux disease without esophagitis   • No diabetic retinopathy in both eyes       Advance Care Planning:  has NO advance directive - not interested in  "additional information    Identification of Risk Factors:  Risk factors include: Hypertension, Hyperlipidemia.    Review of Systems    Compared to one year ago, the patient feels his physical health is the same.  Compared to one year ago, the patient feels his mental health is the same.    Objective     Physical Exam    Vitals:    06/11/18 1354   BP: 104/62   BP Location: Left arm   Patient Position: Sitting   Cuff Size: Adult   Pulse: 80   Weight: 65.3 kg (144 lb)   Height: 172.7 cm (68\")   PainSc: 0-No pain       Patient's Body mass index is 21.9 kg/m². BMI is within normal parameters. No follow-up required.      Assessment/Plan   Patient Self-Management and Personalized Health Advice  The patient has been provided with information about: diet and exercise and preventive services including:   · Exercise counseling provided, Fall Risk assessment done, Nutrition counseling provided, Pneumococcal vaccine , Prostate cancer screening discussed.    Visit Diagnoses:    ICD-10-CM ICD-9-CM   1. Medicare annual wellness visit, subsequent Z00.00 V70.0   2. Type 2 diabetes mellitus without complication, without long-term current use of insulin E11.9 250.00   3. Essential hypertension I10 401.9   4. Mixed hyperlipidemia E78.2 272.2   5. Gastroesophageal reflux disease without esophagitis K21.9 530.81   6. Primary osteoarthritis involving multiple joints M15.0 715.09   7. No diabetic retinopathy in both eyes Z03.89 V71.89   8. Pneumococcal vaccination given Z23 V06.6       Orders Placed This Encounter   Procedures   • Pneumococcal Polysaccharide Vaccine 23-Valent Greater Than or Equal To 3yo Subcutaneous / IM   • Comprehensive Metabolic Panel     Standing Status:   Future     Standing Expiration Date:   11/8/2018   • Hemoglobin A1c     Standing Status:   Future     Standing Expiration Date:   11/8/2018   • LDL Cholesterol, Direct     Standing Status:   Future     Standing Expiration Date:   11/8/2018   • T4, Free     Standing " Status:   Future     Standing Expiration Date:   11/8/2018   • TSH     Standing Status:   Future     Standing Expiration Date:   11/8/2018   • Urinalysis With / Culture If Indicated - Urine, Clean Catch     Standing Status:   Future     Standing Expiration Date:   11/8/2018   • CBC & Differential     Standing Status:   Future     Standing Expiration Date:   11/8/2018     Order Specific Question:   Manual Differential     Answer:   No       Outpatient Encounter Prescriptions as of 6/11/2018   Medication Sig Dispense Refill   • glucose blood test strip OneTouch Ultra Test strips - glucose testing strips to test FSBS once daily, as directed.DX:250.00. 90 each 3   • lisinopril (PRINIVIL,ZESTRIL) 10 MG tablet Take 1 tablet by mouth Daily. 90 tablet 1   • metFORMIN ER (GLUCOPHAGE-XR) 500 MG 24 hr tablet TAKE 2 TABLETS BY MOUTH EVERY EVENING 180 tablet 1   • Multiple Vitamins-Minerals (CENTRUM SILVER PO) Take 1 tablet by mouth daily.     • pantoprazole (PROTONIX) 40 MG EC tablet TAKE 1 TABLET BY MOUTH DAILY. 90 tablet 10   • pravastatin (PRAVACHOL) 20 MG tablet Take 1 tablet by mouth Every Night. 90 tablet 1   • Vitamin E 400 UNITS tablet Take 1 tablet by mouth daily.     • [DISCONTINUED] lisinopril (PRINIVIL,ZESTRIL) 10 MG tablet TAKE 1 TABLET BY MOUTH DAILY. 90 tablet 1   • [DISCONTINUED] pravastatin (PRAVACHOL) 20 MG tablet TAKE 1 TABLET BY MOUTH EVERY NIGHT. 90 tablet 1     No facility-administered encounter medications on file as of 6/11/2018.        Reviewed use of high risk medication in the elderly: yes  Reviewed for potential of harmful drug interactions in the elderly: yes    Follow Up:  Return in about 4 months (around 10/11/2018) for Next scheduled follow up, Or sooner as needed With Labs prior to appointment.     An After Visit Summary and PPPS with all of these plans were given to the patient.

## 2018-06-11 NOTE — PROGRESS NOTES
Chief Complaint   Patient presents with   • Annual Exam     Medicare Wellness follow up   • Hyperlipidemia   • Hypertension   • Diabetes     Subjective   Ronald Irizarry is a 68 y.o. male who presents to the office for follow-up and review of labs. He has diabetes and takes his medication as directed.  His blood sugar has been controlled, and he has been compliant with his diabetic diet.  He monitors his blood sugar one time daily.  He has hyperlipidemia and takes pravastatin daily.  He has hypertension and his blood pressure has been well controlled.  He has osteoarthritis and takes over-the-counter NSAIDs as needed.  He takes Protonix daily for treatment of GERD. He has had an eye exam since his last appointment.  This was normal and did not show any evidence of diabetic retinopathy.    The following portions of the patient's history were reviewed and updated as appropriate: allergies, current medications, past family history, past medical history, past social history, past surgical history and problem list.    Review of Systems   Constitutional: Negative for chills, fatigue and fever.   HENT: Negative for congestion, sneezing, sore throat and trouble swallowing.    Eyes: Negative for visual disturbance.   Respiratory: Negative for cough, chest tightness, shortness of breath and wheezing.    Cardiovascular: Negative for chest pain, palpitations and leg swelling.   Gastrointestinal: Negative for abdominal pain, constipation, diarrhea, nausea and vomiting.   Genitourinary: Negative for dysuria, frequency and urgency.   Musculoskeletal: Negative for neck pain.   Skin: Negative for rash.   Neurological: Negative for dizziness, weakness and headaches.   Psychiatric/Behavioral:        Patient denies any feelings of depression and has not felt down, hopeless or lost interest in any activities.   All other systems reviewed and are negative.      Objective   Vitals:    06/11/18 1354   BP: 104/62   BP Location: Left arm  "  Patient Position: Sitting   Cuff Size: Adult   Pulse: 80   Weight: 65.3 kg (144 lb)   Height: 172.7 cm (68\")   PainSc: 0-No pain     Physical Exam   Constitutional: He is oriented to person, place, and time. He appears well-developed and well-nourished. No distress.   HENT:   Head: Normocephalic and atraumatic.   Nose: Nose normal.   Mouth/Throat: Oropharynx is clear and moist. No oropharyngeal exudate.   Eyes: Conjunctivae and EOM are normal. Pupils are equal, round, and reactive to light. No scleral icterus.   Neck: Normal range of motion. Neck supple.   Cardiovascular: Normal rate, regular rhythm and normal heart sounds.  Exam reveals no gallop and no friction rub.    No murmur heard.  Pulmonary/Chest: Effort normal and breath sounds normal. No respiratory distress. He has no wheezes. He has no rales.   Abdominal: Soft. Bowel sounds are normal. He exhibits no distension. There is no tenderness. There is no rebound and no guarding.   Musculoskeletal: Normal range of motion. He exhibits no edema.   Lymphadenopathy:     He has no cervical adenopathy.   Neurological: He is alert and oriented to person, place, and time. No cranial nerve deficit.   Skin: Skin is warm and dry. No rash noted.   Psychiatric: He has a normal mood and affect. His behavior is normal. Judgment and thought content normal.   Nursing note and vitals reviewed.      Assessment/Plan   Ronald was seen today for annual exam, hyperlipidemia, hypertension and diabetes.    Diagnoses and all orders for this visit:    Medicare annual wellness visit, subsequent    Type 2 diabetes mellitus without complication, without long-term current use of insulin  -     Hemoglobin A1c; Future    Essential hypertension  -     CBC & Differential; Future  -     Comprehensive Metabolic Panel; Future  -     T4, Free; Future  -     TSH; Future  -     Urinalysis With / Culture If Indicated - Urine, Clean Catch; Future  -     lisinopril (PRINIVIL,ZESTRIL) 10 MG tablet; Take " 1 tablet by mouth Daily.    Mixed hyperlipidemia  -     LDL Cholesterol, Direct; Future  -     pravastatin (PRAVACHOL) 20 MG tablet; Take 1 tablet by mouth Every Night.    Gastroesophageal reflux disease without esophagitis    Primary osteoarthritis involving multiple joints    No diabetic retinopathy in both eyes    Pneumococcal vaccination given  -     Pneumococcal Polysaccharide Vaccine 23-Valent Greater Than or Equal To 3yo Subcutaneous / IM         Labs are reviewed with patient. His glucose and hemoglobin A1c show good control of his diabetes.  He will continue with his current diabetic medications and may monitor blood sugar one time daily.  His LDL is controlled and he will continue with pravastatin.  His blood pressure is well controlled and he will continue with lisinopril.  He will continue with Protonix for treatment of his GERD.    PHQ-2/PHQ-9 Depression Screening 6/11/2018   Little interest or pleasure in doing things 0   Feeling down, depressed, or hopeless 0   Trouble falling or staying asleep, or sleeping too much -   Feeling tired or having little energy -   Poor appetite or overeating -   Feeling bad about yourself - or that you are a failure or have let yourself or your family down -   Trouble concentrating on things, such as reading the newspaper or watching television -   Moving or speaking so slowly that other people could have noticed. Or the opposite - being so fidgety or restless that you have been moving around a lot more than usual -   Thoughts that you would be better off dead, or of hurting yourself in some way -   Total Score 0         Lab on 05/29/2018   Component Date Value Ref Range Status   • Glucose 05/29/2018 119* 70 - 100 mg/dL Final   • BUN 05/29/2018 15  8 - 25 mg/dL Final   • Creatinine 05/29/2018 0.70  0.40 - 1.30 mg/dL Final   • Sodium 05/29/2018 138  134 - 146 mmol/L Final   • Potassium 05/29/2018 4.0  3.4 - 5.4 mmol/L Final   • Chloride 05/29/2018 100  100 - 112 mmol/L  Final   • CO2 05/29/2018 27.0  20.0 - 32.0 mmol/L Final   • Calcium 05/29/2018 9.1  8.4 - 10.8 mg/dL Final   • Total Protein 05/29/2018 6.9  6.7 - 8.2 g/dL Final   • Albumin 05/29/2018 4.10  3.20 - 5.50 g/dL Final   • ALT (SGPT) 05/29/2018 17  10 - 60 U/L Final   • AST (SGOT) 05/29/2018 17  10 - 60 U/L Final   • Alkaline Phosphatase 05/29/2018 45  15 - 121 U/L Final   • Total Bilirubin 05/29/2018 0.6  0.2 - 1.0 mg/dL Final   • eGFR Non African Amer 05/29/2018 112  49 - 113 mL/min/1.73 Final   • Globulin 05/29/2018 2.8  2.5 - 4.6 gm/dL Final   • A/G Ratio 05/29/2018 1.5  1.0 - 3.0 g/dL Final   • BUN/Creatinine Ratio 05/29/2018 21.4  7.0 - 25.0 Final   • Anion Gap 05/29/2018 11.0  5.0 - 15.0 mmol/L Final   • Hemoglobin A1C 05/29/2018 6.6* 4 - 5.6 % Final   • LDL Cholesterol  05/29/2018 109  0 - 129 mg/dL Final   • Color, UA 05/29/2018 Yellow  Yellow, Straw Final   • Appearance, UA 05/29/2018 Slightly Cloudy* Clear Final   • pH, UA 05/29/2018 7.5  5.5 - 8.0 Final   • Specific Gravity, UA 05/29/2018 1.015  1.005 - 1.030 Final   • Glucose, UA 05/29/2018 Negative  Negative Final   • Ketones, UA 05/29/2018 Negative  Negative Final   • Bilirubin, UA 05/29/2018 Negative  Negative Final   • Blood, UA 05/29/2018 Negative  Negative Final   • Protein, UA 05/29/2018 Negative  Negative Final   • Leuk Esterase, UA 05/29/2018 Negative  Negative Final   • Nitrite, UA 05/29/2018 Negative  Negative Final   • Urobilinogen, UA 05/29/2018 0.2 E.U./dL  0.2 - 1.0 E.U./dL Final   • Microalbumin/Creatinine Ratio 05/29/2018   0.0 - 30.0 mg/g Final    Unable to calculate   • Creatinine, Urine 05/29/2018 78.7  mg/dL Final   • Microalbumin, Urine 05/29/2018 <0.6  mg/L Final   • WBC 05/29/2018 5.99  3.20 - 9.80 10*3/mm3 Final   • RBC 05/29/2018 4.86  4.37 - 5.74 10*6/mm3 Final   • Hemoglobin 05/29/2018 14.4  13.7 - 17.3 g/dL Final   • Hematocrit 05/29/2018 44.1  39.0 - 49.0 % Final   • MCV 05/29/2018 90.7  80.0 - 98.0 fL Final   • MCH 05/29/2018  29.6  26.5 - 34.0 pg Final   • MCHC 05/29/2018 32.7  31.5 - 36.3 g/dL Final   • RDW 05/29/2018 12.6  11.5 - 14.5 % Final   • RDW-SD 05/29/2018 41.2  35.1 - 43.9 fl Final   • MPV 05/29/2018 8.8  8.0 - 12.0 fL Final   • Platelets 05/29/2018 237  150 - 450 10*3/mm3 Final   • Neutrophil % 05/29/2018 43.1  37.0 - 80.0 % Final   • Lymphocyte % 05/29/2018 44.7  10.0 - 50.0 % Final   • Monocyte % 05/29/2018 6.0  0.0 - 12.0 % Final   • Eosinophil % 05/29/2018 6.0  0.0 - 7.0 % Final   • Basophil % 05/29/2018 0.2  0.0 - 2.0 % Final   • Neutrophils, Absolute 05/29/2018 2.58  2.00 - 8.60 10*3/mm3 Final   • Lymphocytes, Absolute 05/29/2018 2.68  0.60 - 4.20 10*3/mm3 Final   • Monocytes, Absolute 05/29/2018 0.36  0.00 - 0.90 10*3/mm3 Final   • Eosinophils, Absolute 05/29/2018 0.36  0.00 - 0.70 10*3/mm3 Final   • Basophils, Absolute 05/29/2018 0.01  0.00 - 0.20 10*3/mm3 Final   ]        .

## 2018-10-01 ENCOUNTER — LAB (OUTPATIENT)
Dept: LAB | Facility: OTHER | Age: 68
End: 2018-10-01

## 2018-10-01 DIAGNOSIS — E78.2 MIXED HYPERLIPIDEMIA: Chronic | ICD-10-CM

## 2018-10-01 DIAGNOSIS — I10 ESSENTIAL HYPERTENSION: ICD-10-CM

## 2018-10-01 DIAGNOSIS — E11.9 TYPE 2 DIABETES MELLITUS WITHOUT COMPLICATION, WITHOUT LONG-TERM CURRENT USE OF INSULIN (HCC): Chronic | ICD-10-CM

## 2018-10-01 LAB
ALBUMIN SERPL-MCNC: 4.3 G/DL (ref 3.5–5)
ALBUMIN/GLOB SERPL: 1.5 G/DL (ref 1.1–1.8)
ALP SERPL-CCNC: 52 U/L (ref 38–126)
ALT SERPL W P-5'-P-CCNC: 17 U/L
ANION GAP SERPL CALCULATED.3IONS-SCNC: 8 MMOL/L (ref 5–15)
ARTICHOKE IGE QN: 104 MG/DL (ref 1–129)
AST SERPL-CCNC: 20 U/L (ref 17–59)
BASOPHILS # BLD AUTO: 0.02 10*3/MM3 (ref 0–0.2)
BASOPHILS NFR BLD AUTO: 0.3 % (ref 0–2)
BILIRUB SERPL-MCNC: 0.7 MG/DL (ref 0.2–1.3)
BILIRUB UR QL STRIP: NEGATIVE
BUN BLD-MCNC: 22 MG/DL (ref 9–20)
BUN/CREAT SERPL: 28.9 (ref 7–25)
CALCIUM SPEC-SCNC: 9.3 MG/DL (ref 8.4–10.2)
CHLORIDE SERPL-SCNC: 104 MMOL/L (ref 98–107)
CLARITY UR: CLEAR
CO2 SERPL-SCNC: 28 MMOL/L (ref 22–30)
COLOR UR: YELLOW
CREAT BLD-MCNC: 0.76 MG/DL (ref 0.66–1.25)
DEPRECATED RDW RBC AUTO: 41.6 FL (ref 35.1–43.9)
EOSINOPHIL # BLD AUTO: 0.39 10*3/MM3 (ref 0–0.7)
EOSINOPHIL NFR BLD AUTO: 6.5 % (ref 0–7)
ERYTHROCYTE [DISTWIDTH] IN BLOOD BY AUTOMATED COUNT: 12.7 % (ref 11.5–14.5)
GFR SERPL CREATININE-BSD FRML MDRD: 102 ML/MIN/1.73 (ref 49–113)
GLOBULIN UR ELPH-MCNC: 2.9 GM/DL (ref 2.3–3.5)
GLUCOSE BLD-MCNC: 110 MG/DL (ref 74–99)
GLUCOSE UR STRIP-MCNC: NEGATIVE MG/DL
HBA1C MFR BLD: 6.7 % (ref 4–5.6)
HCT VFR BLD AUTO: 44.8 % (ref 39–49)
HGB BLD-MCNC: 14.4 G/DL (ref 13.7–17.3)
HGB UR QL STRIP.AUTO: NEGATIVE
KETONES UR QL STRIP: NEGATIVE
LEUKOCYTE ESTERASE UR QL STRIP.AUTO: NEGATIVE
LYMPHOCYTES # BLD AUTO: 2.74 10*3/MM3 (ref 0.6–4.2)
LYMPHOCYTES NFR BLD AUTO: 45.7 % (ref 10–50)
MCH RBC QN AUTO: 29.4 PG (ref 26.5–34)
MCHC RBC AUTO-ENTMCNC: 32.1 G/DL (ref 31.5–36.3)
MCV RBC AUTO: 91.6 FL (ref 80–98)
MONOCYTES # BLD AUTO: 0.37 10*3/MM3 (ref 0–0.9)
MONOCYTES NFR BLD AUTO: 6.2 % (ref 0–12)
NEUTROPHILS # BLD AUTO: 2.47 10*3/MM3 (ref 2–8.6)
NEUTROPHILS NFR BLD AUTO: 41.3 % (ref 37–80)
NITRITE UR QL STRIP: NEGATIVE
PH UR STRIP.AUTO: 7 [PH] (ref 5.5–8)
PLATELET # BLD AUTO: 228 10*3/MM3 (ref 150–450)
PMV BLD AUTO: 8.5 FL (ref 8–12)
POTASSIUM BLD-SCNC: 4.3 MMOL/L (ref 3.4–5)
PROT SERPL-MCNC: 7.2 G/DL (ref 6.3–8.2)
PROT UR QL STRIP: NEGATIVE
RBC # BLD AUTO: 4.89 10*6/MM3 (ref 4.37–5.74)
SODIUM BLD-SCNC: 140 MMOL/L (ref 137–145)
SP GR UR STRIP: 1.01 (ref 1–1.03)
T4 FREE SERPL-MCNC: 1.3 NG/DL (ref 0.78–2.19)
TSH SERPL DL<=0.05 MIU/L-ACNC: 0.96 MIU/ML (ref 0.46–4.68)
UROBILINOGEN UR QL STRIP: NORMAL
WBC NRBC COR # BLD: 5.99 10*3/MM3 (ref 3.2–9.8)

## 2018-10-01 PROCEDURE — 81003 URINALYSIS AUTO W/O SCOPE: CPT | Performed by: INTERNAL MEDICINE

## 2018-10-01 PROCEDURE — 83036 HEMOGLOBIN GLYCOSYLATED A1C: CPT | Performed by: INTERNAL MEDICINE

## 2018-10-01 PROCEDURE — 84443 ASSAY THYROID STIM HORMONE: CPT | Performed by: INTERNAL MEDICINE

## 2018-10-01 PROCEDURE — 85025 COMPLETE CBC W/AUTO DIFF WBC: CPT | Performed by: INTERNAL MEDICINE

## 2018-10-01 PROCEDURE — 80053 COMPREHEN METABOLIC PANEL: CPT | Performed by: INTERNAL MEDICINE

## 2018-10-01 PROCEDURE — 84439 ASSAY OF FREE THYROXINE: CPT | Performed by: INTERNAL MEDICINE

## 2018-10-01 PROCEDURE — 36415 COLL VENOUS BLD VENIPUNCTURE: CPT | Performed by: INTERNAL MEDICINE

## 2018-10-01 PROCEDURE — 83721 ASSAY OF BLOOD LIPOPROTEIN: CPT | Performed by: INTERNAL MEDICINE

## 2018-10-12 ENCOUNTER — OFFICE VISIT (OUTPATIENT)
Dept: FAMILY MEDICINE CLINIC | Facility: CLINIC | Age: 68
End: 2018-10-12

## 2018-10-12 VITALS
HEIGHT: 68 IN | WEIGHT: 147 LBS | DIASTOLIC BLOOD PRESSURE: 66 MMHG | BODY MASS INDEX: 22.28 KG/M2 | SYSTOLIC BLOOD PRESSURE: 124 MMHG | TEMPERATURE: 97.5 F | HEART RATE: 84 BPM | OXYGEN SATURATION: 98 %

## 2018-10-12 DIAGNOSIS — E11.9 TYPE 2 DIABETES MELLITUS WITHOUT COMPLICATION, WITHOUT LONG-TERM CURRENT USE OF INSULIN (HCC): Primary | Chronic | ICD-10-CM

## 2018-10-12 DIAGNOSIS — Z23 INFLUENZA VACCINATION ADMINISTERED AT CURRENT VISIT: ICD-10-CM

## 2018-10-12 DIAGNOSIS — I10 ESSENTIAL HYPERTENSION: Chronic | ICD-10-CM

## 2018-10-12 DIAGNOSIS — Z12.5 SCREENING FOR PROSTATE CANCER: ICD-10-CM

## 2018-10-12 DIAGNOSIS — E78.2 MIXED HYPERLIPIDEMIA: Chronic | ICD-10-CM

## 2018-10-12 DIAGNOSIS — M15.9 PRIMARY OSTEOARTHRITIS INVOLVING MULTIPLE JOINTS: Chronic | ICD-10-CM

## 2018-10-12 DIAGNOSIS — K21.9 GASTROESOPHAGEAL REFLUX DISEASE WITHOUT ESOPHAGITIS: Chronic | ICD-10-CM

## 2018-10-12 PROCEDURE — G0008 ADMIN INFLUENZA VIRUS VAC: HCPCS | Performed by: INTERNAL MEDICINE

## 2018-10-12 PROCEDURE — 99214 OFFICE O/P EST MOD 30 MIN: CPT | Performed by: INTERNAL MEDICINE

## 2018-10-12 PROCEDURE — 90662 IIV NO PRSV INCREASED AG IM: CPT | Performed by: INTERNAL MEDICINE

## 2018-10-12 RX ORDER — METFORMIN HYDROCHLORIDE 500 MG/1
1000 TABLET, EXTENDED RELEASE ORAL EVERY EVENING
Qty: 180 TABLET | Refills: 1 | Status: SHIPPED | OUTPATIENT
Start: 2018-10-12 | End: 2019-04-15 | Stop reason: SDUPTHER

## 2018-10-12 RX ORDER — LISINOPRIL 10 MG/1
10 TABLET ORAL DAILY
Qty: 90 TABLET | Refills: 1 | Status: SHIPPED | OUTPATIENT
Start: 2018-10-12 | End: 2019-05-06 | Stop reason: SDUPTHER

## 2018-10-12 RX ORDER — PRAVASTATIN SODIUM 20 MG
20 TABLET ORAL NIGHTLY
Qty: 90 TABLET | Refills: 1 | Status: SHIPPED | OUTPATIENT
Start: 2018-10-12 | End: 2019-05-06 | Stop reason: SDUPTHER

## 2018-10-12 NOTE — PROGRESS NOTES
Chief Complaint   Patient presents with   • Diabetes   • Hypertension   • Hyperlipidemia     Subjective   Ronald Irizarry is a 68 y.o. male who presents to the office for follow-up and review of labs. He has diabetes and is compliant with his medication.  His blood sugar has been controlled, and he has been compliant with his diabetic diet.  He monitors his blood sugar one time daily.  He has hyperlipidemia and takes pravastatin daily.  He has hypertension and his blood pressure has been well controlled.  He has osteoarthritis and takes over-the-counter NSAIDs as needed.  He has been having increased pain in his right knee for the past couple of days.  He has been exercising and lifting weights, and thinks this has caused some inflammation.  He takes Protonix daily for treatment of GERD.    The following portions of the patient's history were reviewed and updated as appropriate: allergies, current medications, past family history, past medical history, past social history, past surgical history and problem list.    Review of Systems   Constitutional: Negative for chills, fatigue and fever.   HENT: Negative for congestion, sneezing, sore throat and trouble swallowing.    Eyes: Negative for visual disturbance.   Respiratory: Negative for cough, chest tightness, shortness of breath and wheezing.    Cardiovascular: Negative for chest pain, palpitations and leg swelling.   Gastrointestinal: Negative for abdominal pain, constipation, diarrhea, nausea and vomiting.   Genitourinary: Negative for dysuria, frequency and urgency.   Musculoskeletal: Negative for neck pain.   Skin: Negative for rash.   Neurological: Negative for dizziness, weakness and headaches.   Psychiatric/Behavioral:        Patient denies any feelings of depression and has not felt down, hopeless or lost interest in any activities.   All other systems reviewed and are negative.      Objective   Vitals:    10/12/18 1322   BP: 124/66   BP Location: Left arm  "  Patient Position: Sitting   Cuff Size: Adult   Pulse: 84   Temp: 97.5 °F (36.4 °C)   TempSrc: Oral   SpO2: 98%   Weight: 66.7 kg (147 lb)   Height: 172.7 cm (68\")   PainSc: 2  Comment: pain behind right knee   PainLoc: Knee     Physical Exam   Constitutional: He is oriented to person, place, and time. He appears well-developed and well-nourished. No distress.   HENT:   Head: Normocephalic and atraumatic.   Nose: Nose normal.   Mouth/Throat: Oropharynx is clear and moist. No oropharyngeal exudate.   Eyes: Pupils are equal, round, and reactive to light. Conjunctivae and EOM are normal. No scleral icterus.   Neck: Normal range of motion. Neck supple.   Cardiovascular: Normal rate, regular rhythm and normal heart sounds.  Exam reveals no gallop and no friction rub.    No murmur heard.  Pulmonary/Chest: Effort normal and breath sounds normal. No respiratory distress. He has no wheezes. He has no rales.   Abdominal: Soft. Bowel sounds are normal. He exhibits no distension. There is no tenderness. There is no rebound and no guarding.   Musculoskeletal: Normal range of motion. He exhibits no edema.   Lymphadenopathy:     He has no cervical adenopathy.   Neurological: He is alert and oriented to person, place, and time. No cranial nerve deficit.   Skin: Skin is warm and dry. No rash noted.   Psychiatric: He has a normal mood and affect. His behavior is normal. Judgment and thought content normal.   Nursing note and vitals reviewed.      Assessment/Plan   Ronald was seen today for diabetes, hypertension and hyperlipidemia.    Diagnoses and all orders for this visit:    Type 2 diabetes mellitus without complication, without long-term current use of insulin (CMS/[facility])  -     Hemoglobin A1c; Future  -     metFORMIN ER (GLUCOPHAGE-XR) 500 MG 24 hr tablet; Take 2 tablets by mouth Every Evening.    Essential hypertension  -     CBC & Differential; Future  -     Comprehensive Metabolic Panel; Future  -     Urinalysis With Culture " If Indicated - Urine, Clean Catch; Future  -     lisinopril (PRINIVIL,ZESTRIL) 10 MG tablet; Take 1 tablet by mouth Daily.    Mixed hyperlipidemia  -     Lipid Panel; Future  -     pravastatin (PRAVACHOL) 20 MG tablet; Take 1 tablet by mouth Every Night.    Gastroesophageal reflux disease without esophagitis    Primary osteoarthritis involving multiple joints    Screening for prostate cancer  -     PSA Screen; Future    Influenza vaccination administered at current visit  -     Fluzone High Dose =>65Years         Labs are reviewed with patient. His glucose and hemoglobin A1c show good control of his diabetes.  He will continue with his current diabetic medications. He may monitor blood sugar one time daily.  His LDL is controlled, and he will continue with pravastatin.  His blood pressure is well controlled and he will continue with lisinopril.  He will continue with Protonix for treatment of his GERD.  He may continue to use an over-the-counter NSAID as needed for osteoarthritis.    Patient is given a flu shot today.     PHQ-2/PHQ-9 Depression Screening 10/12/2018   Little interest or pleasure in doing things 0   Feeling down, depressed, or hopeless 0   Trouble falling or staying asleep, or sleeping too much -   Feeling tired or having little energy -   Poor appetite or overeating -   Feeling bad about yourself - or that you are a failure or have let yourself or your family down -   Trouble concentrating on things, such as reading the newspaper or watching television -   Moving or speaking so slowly that other people could have noticed. Or the opposite - being so fidgety or restless that you have been moving around a lot more than usual -   Thoughts that you would be better off dead, or of hurting yourself in some way -   Total Score 0         Lab on 10/01/2018   Component Date Value Ref Range Status   • Color, UA 10/01/2018 Yellow  Yellow, Straw Final   • Appearance, UA 10/01/2018 Clear  Clear Final   • pH, UA  10/01/2018 7.0  5.5 - 8.0 Final   • Specific Gravity, UA 10/01/2018 1.015  1.005 - 1.030 Final   • Glucose, UA 10/01/2018 Negative  Negative Final   • Ketones, UA 10/01/2018 Negative  Negative Final   • Bilirubin, UA 10/01/2018 Negative  Negative Final   • Blood, UA 10/01/2018 Negative  Negative Final   • Protein, UA 10/01/2018 Negative  Negative Final   • Leuk Esterase, UA 10/01/2018 Negative  Negative Final   • Nitrite, UA 10/01/2018 Negative  Negative Final   • Urobilinogen, UA 10/01/2018 0.2 E.U./dL  0.2 - 1.0 E.U./dL Final   • Glucose 10/01/2018 110* 74 - 99 mg/dL Final   • BUN 10/01/2018 22* 9 - 20 mg/dL Final   • Creatinine 10/01/2018 0.76  0.66 - 1.25 mg/dL Final   • Sodium 10/01/2018 140  137 - 145 mmol/L Final   • Potassium 10/01/2018 4.3  3.4 - 5.0 mmol/L Final   • Chloride 10/01/2018 104  98 - 107 mmol/L Final   • CO2 10/01/2018 28.0  22.0 - 30.0 mmol/L Final   • Calcium 10/01/2018 9.3  8.4 - 10.2 mg/dL Final   • Total Protein 10/01/2018 7.2  6.3 - 8.2 g/dL Final   • Albumin 10/01/2018 4.30  3.50 - 5.00 g/dL Final   • ALT (SGPT) 10/01/2018 17  <=50 U/L Final   • AST (SGOT) 10/01/2018 20  17 - 59 U/L Final   • Alkaline Phosphatase 10/01/2018 52  38 - 126 U/L Final   • Total Bilirubin 10/01/2018 0.7  0.2 - 1.3 mg/dL Final   • eGFR Non African Amer 10/01/2018 102  49 - 113 mL/min/1.73 Final   • Globulin 10/01/2018 2.9  2.3 - 3.5 gm/dL Final   • A/G Ratio 10/01/2018 1.5  1.1 - 1.8 g/dL Final   • BUN/Creatinine Ratio 10/01/2018 28.9* 7.0 - 25.0 Final   • Anion Gap 10/01/2018 8.0  5.0 - 15.0 mmol/L Final   • Hemoglobin A1C 10/01/2018 6.7* 4 - 5.6 % Final   • LDL Cholesterol  10/01/2018 104  1 - 129 mg/dL Final   • Free T4 10/01/2018 1.30  0.78 - 2.19 ng/dL Final   • TSH 10/01/2018 0.960  0.460 - 4.680 mIU/mL Final   • WBC 10/01/2018 5.99  3.20 - 9.80 10*3/mm3 Final   • RBC 10/01/2018 4.89  4.37 - 5.74 10*6/mm3 Final   • Hemoglobin 10/01/2018 14.4  13.7 - 17.3 g/dL Final   • Hematocrit 10/01/2018 44.8  39.0 -  49.0 % Final   • MCV 10/01/2018 91.6  80.0 - 98.0 fL Final   • MCH 10/01/2018 29.4  26.5 - 34.0 pg Final   • MCHC 10/01/2018 32.1  31.5 - 36.3 g/dL Final   • RDW 10/01/2018 12.7  11.5 - 14.5 % Final   • RDW-SD 10/01/2018 41.6  35.1 - 43.9 fl Final   • MPV 10/01/2018 8.5  8.0 - 12.0 fL Final   • Platelets 10/01/2018 228  150 - 450 10*3/mm3 Final   • Neutrophil % 10/01/2018 41.3  37.0 - 80.0 % Final   • Lymphocyte % 10/01/2018 45.7  10.0 - 50.0 % Final   • Monocyte % 10/01/2018 6.2  0.0 - 12.0 % Final   • Eosinophil % 10/01/2018 6.5  0.0 - 7.0 % Final   • Basophil % 10/01/2018 0.3  0.0 - 2.0 % Final   • Neutrophils, Absolute 10/01/2018 2.47  2.00 - 8.60 10*3/mm3 Final   • Lymphocytes, Absolute 10/01/2018 2.74  0.60 - 4.20 10*3/mm3 Final   • Monocytes, Absolute 10/01/2018 0.37  0.00 - 0.90 10*3/mm3 Final   • Eosinophils, Absolute 10/01/2018 0.39  0.00 - 0.70 10*3/mm3 Final   • Basophils, Absolute 10/01/2018 0.02  0.00 - 0.20 10*3/mm3 Final   ]        .

## 2018-12-07 ENCOUNTER — TELEPHONE (OUTPATIENT)
Dept: FAMILY MEDICINE CLINIC | Facility: CLINIC | Age: 68
End: 2018-12-07

## 2018-12-07 NOTE — TELEPHONE ENCOUNTER
Called patient to let him know that Dr. Manriquez did not see him for this and that he would have to talk to Urgent care about this.

## 2018-12-07 NOTE — TELEPHONE ENCOUNTER
----- Message from Annie Bowman LPN sent at 12/7/2018  1:19 PM CST -----  Regarding: RE: HAS QUESTION ABOUT MEDICINES HE IS TAKING  Contact: [phone]  We did not see patient, unable to comment if this is okay or not. He would need to follow advice of the provider he saw yesterday or go back to urgent care.   ----- Message -----  From: Kristen Leiva  Sent: 12/7/2018   1:03 PM  To: Annie Bowman LPN  Subject: HAS QUESTION ABOUT MEDICINES HE IS TAKING         Patient called and said that he went to urgent care yesterday and was given amoxicillin (AMOXIL) 500 MG, Claritin and he is also taking Mucinex. He said taht his nose is dripping and he is taking Benadryl. He said that he is taking Culturelle also. He is wanting to know if this is ok.

## 2019-02-04 ENCOUNTER — LAB (OUTPATIENT)
Dept: LAB | Facility: OTHER | Age: 69
End: 2019-02-04

## 2019-02-04 DIAGNOSIS — E78.2 MIXED HYPERLIPIDEMIA: Chronic | ICD-10-CM

## 2019-02-04 DIAGNOSIS — I10 ESSENTIAL HYPERTENSION: ICD-10-CM

## 2019-02-04 DIAGNOSIS — Z12.5 SCREENING FOR PROSTATE CANCER: ICD-10-CM

## 2019-02-04 DIAGNOSIS — E11.9 TYPE 2 DIABETES MELLITUS WITHOUT COMPLICATION, WITHOUT LONG-TERM CURRENT USE OF INSULIN (HCC): Chronic | ICD-10-CM

## 2019-02-04 LAB
ALBUMIN SERPL-MCNC: 4.4 G/DL (ref 3.5–5)
ALBUMIN/GLOB SERPL: 1.5 G/DL (ref 1.1–1.8)
ALP SERPL-CCNC: 62 U/L (ref 38–126)
ALT SERPL W P-5'-P-CCNC: 24 U/L
ANION GAP SERPL CALCULATED.3IONS-SCNC: 9 MMOL/L (ref 5–15)
AST SERPL-CCNC: 24 U/L (ref 17–59)
BASOPHILS # BLD AUTO: 0.03 10*3/MM3 (ref 0–0.2)
BASOPHILS NFR BLD AUTO: 0.5 % (ref 0–2)
BILIRUB SERPL-MCNC: 0.8 MG/DL (ref 0.2–1.3)
BILIRUB UR QL STRIP: NEGATIVE
BUN BLD-MCNC: 20 MG/DL (ref 9–20)
BUN/CREAT SERPL: 25 (ref 7–25)
CALCIUM SPEC-SCNC: 9.3 MG/DL (ref 8.4–10.2)
CHLORIDE SERPL-SCNC: 100 MMOL/L (ref 98–107)
CHOLEST SERPL-MCNC: 183 MG/DL (ref 150–200)
CLARITY UR: CLEAR
CO2 SERPL-SCNC: 31 MMOL/L (ref 22–30)
COLOR UR: YELLOW
CREAT BLD-MCNC: 0.8 MG/DL (ref 0.66–1.25)
DEPRECATED RDW RBC AUTO: 41.6 FL (ref 35.1–43.9)
EOSINOPHIL # BLD AUTO: 0.36 10*3/MM3 (ref 0–0.7)
EOSINOPHIL NFR BLD AUTO: 5.7 % (ref 0–7)
ERYTHROCYTE [DISTWIDTH] IN BLOOD BY AUTOMATED COUNT: 12.9 % (ref 11.5–14.5)
GFR SERPL CREATININE-BSD FRML MDRD: 96 ML/MIN/1.73 (ref 42–98)
GLOBULIN UR ELPH-MCNC: 2.9 GM/DL (ref 2.3–3.5)
GLUCOSE BLD-MCNC: 113 MG/DL (ref 74–99)
GLUCOSE UR STRIP-MCNC: NEGATIVE MG/DL
HBA1C MFR BLD: 6.9 % (ref 4–5.6)
HCT VFR BLD AUTO: 45.5 % (ref 39–49)
HDLC SERPL-MCNC: 44 MG/DL (ref 40–59)
HGB BLD-MCNC: 14.6 G/DL (ref 13.7–17.3)
HGB UR QL STRIP.AUTO: NEGATIVE
KETONES UR QL STRIP: NEGATIVE
LDLC SERPL CALC-MCNC: 115 MG/DL
LDLC/HDLC SERPL: 2.61 {RATIO} (ref 0–3.55)
LEUKOCYTE ESTERASE UR QL STRIP.AUTO: NEGATIVE
LYMPHOCYTES # BLD AUTO: 2.79 10*3/MM3 (ref 0.6–4.2)
LYMPHOCYTES NFR BLD AUTO: 44 % (ref 10–50)
MCH RBC QN AUTO: 29 PG (ref 26.5–34)
MCHC RBC AUTO-ENTMCNC: 32.1 G/DL (ref 31.5–36.3)
MCV RBC AUTO: 90.3 FL (ref 80–98)
MONOCYTES # BLD AUTO: 0.41 10*3/MM3 (ref 0–0.9)
MONOCYTES NFR BLD AUTO: 6.5 % (ref 0–12)
NEUTROPHILS # BLD AUTO: 2.75 10*3/MM3 (ref 2–8.6)
NEUTROPHILS NFR BLD AUTO: 43.3 % (ref 37–80)
NITRITE UR QL STRIP: NEGATIVE
PH UR STRIP.AUTO: 7 [PH] (ref 5.5–8)
PLATELET # BLD AUTO: 248 10*3/MM3 (ref 150–450)
PMV BLD AUTO: 8.6 FL (ref 8–12)
POTASSIUM BLD-SCNC: 4.3 MMOL/L (ref 3.4–5)
PROT SERPL-MCNC: 7.3 G/DL (ref 6.3–8.2)
PROT UR QL STRIP: NEGATIVE
PSA SERPL-MCNC: 1.74 NG/ML (ref 0–4)
RBC # BLD AUTO: 5.04 10*6/MM3 (ref 4.37–5.74)
SODIUM BLD-SCNC: 140 MMOL/L (ref 137–145)
SP GR UR STRIP: 1.01 (ref 1–1.03)
TRIGL SERPL-MCNC: 120 MG/DL
UROBILINOGEN UR QL STRIP: NORMAL
VLDLC SERPL-MCNC: 24 MG/DL
WBC NRBC COR # BLD: 6.34 10*3/MM3 (ref 3.2–9.8)

## 2019-02-04 PROCEDURE — 83036 HEMOGLOBIN GLYCOSYLATED A1C: CPT | Performed by: INTERNAL MEDICINE

## 2019-02-04 PROCEDURE — 36415 COLL VENOUS BLD VENIPUNCTURE: CPT | Performed by: INTERNAL MEDICINE

## 2019-02-04 PROCEDURE — 81003 URINALYSIS AUTO W/O SCOPE: CPT | Performed by: INTERNAL MEDICINE

## 2019-02-04 PROCEDURE — 80053 COMPREHEN METABOLIC PANEL: CPT | Performed by: INTERNAL MEDICINE

## 2019-02-04 PROCEDURE — G0103 PSA SCREENING: HCPCS | Performed by: INTERNAL MEDICINE

## 2019-02-04 PROCEDURE — 85025 COMPLETE CBC W/AUTO DIFF WBC: CPT | Performed by: INTERNAL MEDICINE

## 2019-02-04 PROCEDURE — 80061 LIPID PANEL: CPT | Performed by: INTERNAL MEDICINE

## 2019-02-11 ENCOUNTER — OFFICE VISIT (OUTPATIENT)
Dept: FAMILY MEDICINE CLINIC | Facility: CLINIC | Age: 69
End: 2019-02-11

## 2019-02-11 VITALS
DIASTOLIC BLOOD PRESSURE: 64 MMHG | BODY MASS INDEX: 21.62 KG/M2 | SYSTOLIC BLOOD PRESSURE: 120 MMHG | TEMPERATURE: 97.4 F | HEIGHT: 70 IN | WEIGHT: 151 LBS | HEART RATE: 83 BPM

## 2019-02-11 DIAGNOSIS — E11.9 TYPE 2 DIABETES MELLITUS WITHOUT COMPLICATION, WITHOUT LONG-TERM CURRENT USE OF INSULIN (HCC): Primary | Chronic | ICD-10-CM

## 2019-02-11 DIAGNOSIS — M15.9 PRIMARY OSTEOARTHRITIS INVOLVING MULTIPLE JOINTS: Chronic | ICD-10-CM

## 2019-02-11 DIAGNOSIS — Z23 NEED FOR SHINGLES VACCINE: ICD-10-CM

## 2019-02-11 DIAGNOSIS — K21.9 GASTROESOPHAGEAL REFLUX DISEASE WITHOUT ESOPHAGITIS: Chronic | ICD-10-CM

## 2019-02-11 DIAGNOSIS — E78.2 MIXED HYPERLIPIDEMIA: Chronic | ICD-10-CM

## 2019-02-11 DIAGNOSIS — I10 ESSENTIAL HYPERTENSION: Chronic | ICD-10-CM

## 2019-02-11 PROCEDURE — 99214 OFFICE O/P EST MOD 30 MIN: CPT | Performed by: INTERNAL MEDICINE

## 2019-02-11 NOTE — PROGRESS NOTES
"Chief Complaint   Patient presents with   • Diabetes     4 mo f/u with labs   • Hypertension     Subjective   Ronald Irizarry is a 69 y.o. male who presents to the office for follow-up and review of labs. He has diabetes and is compliant with his medication and diet.  His blood sugar has been controlled.  He monitors his blood sugar one time daily.  He has hyperlipidemia and takes pravastatin daily.  He has hypertension, and his blood pressure has been well controlled.  He has osteoarthritis and takes over-the-counter NSAIDs as needed. He takes Protonix daily for treatment of GERD.    The following portions of the patient's history were reviewed and updated as appropriate: allergies, current medications, past family history, past medical history, past social history, past surgical history and problem list.    Review of Systems   Constitutional: Negative for chills, fatigue and fever.   HENT: Negative for congestion, sneezing, sore throat and trouble swallowing.    Eyes: Negative for visual disturbance.   Respiratory: Negative for cough, chest tightness, shortness of breath and wheezing.    Cardiovascular: Negative for chest pain, palpitations and leg swelling.   Gastrointestinal: Negative for abdominal pain, constipation, diarrhea, nausea and vomiting.   Genitourinary: Negative for dysuria, frequency and urgency.   Musculoskeletal: Negative for neck pain.   Skin: Negative for rash.   Neurological: Negative for dizziness, weakness and headaches.   Psychiatric/Behavioral:        Patient denies any feelings of depression and has not felt down, hopeless or lost interest in any activities.   All other systems reviewed and are negative.      Objective   Vitals:    02/11/19 1432   BP: 120/64   BP Location: Left arm   Patient Position: Sitting   Cuff Size: Adult   Pulse: 83   Temp: 97.4 °F (36.3 °C)   TempSrc: Oral   Weight: 68.5 kg (151 lb)   Height: 177.8 cm (70\")   PainSc: 0-No pain     Physical Exam   Constitutional: " He is oriented to person, place, and time. He appears well-developed and well-nourished. No distress.   HENT:   Head: Normocephalic and atraumatic.   Nose: Nose normal.   Mouth/Throat: Oropharynx is clear and moist. No oropharyngeal exudate.   Eyes: Conjunctivae and EOM are normal. Pupils are equal, round, and reactive to light. No scleral icterus.   Neck: Normal range of motion. Neck supple.   Cardiovascular: Normal rate, regular rhythm and normal heart sounds. Exam reveals no gallop and no friction rub.   No murmur heard.  Pulmonary/Chest: Effort normal and breath sounds normal. No respiratory distress. He has no wheezes. He has no rales.   Abdominal: Soft. Bowel sounds are normal. He exhibits no distension. There is no tenderness. There is no rebound and no guarding.   Musculoskeletal: Normal range of motion. He exhibits no edema.   Lymphadenopathy:     He has no cervical adenopathy.   Neurological: He is alert and oriented to person, place, and time. No cranial nerve deficit.   Skin: Skin is warm and dry. No rash noted.   Psychiatric: He has a normal mood and affect. His behavior is normal. Judgment and thought content normal.   Nursing note and vitals reviewed.      Assessment/Plan   Ronald was seen today for diabetes and hypertension.    Diagnoses and all orders for this visit:    Type 2 diabetes mellitus without complication, without long-term current use of insulin (CMS/[facility])  -     Hemoglobin A1c; Future  -     Microalbumin / Creatinine Urine Ratio - Urine, Clean Catch; Future    Essential hypertension  -     CBC & Differential; Future  -     Comprehensive Metabolic Panel; Future  -     T4, Free; Future  -     TSH; Future  -     Urinalysis With Culture If Indicated - Urine, Clean Catch; Future    Mixed hyperlipidemia  -     LDL Cholesterol, Direct; Future    Gastroesophageal reflux disease without esophagitis    Primary osteoarthritis involving multiple joints    Need for shingles vaccine  -     Zoster Vac  Recomb Adjuvanted 50 MCG/0.5ML reconstituted suspension; Inject 0.5 mL into the appropriate muscle as directed by prescriber 1 (One) Time for 1 dose. Repeat dose in 2-6 months.         Labs are reviewed with patient. His glucose and hemoglobin A1c show good control of his diabetes.  He will continue with his current diabetic medications. He may monitor blood sugar one time daily.  His lipids are doing well, and he will continue with pravastatin.  His blood pressure is well controlled and he will continue with lisinopril.  He will continue with Protonix for treatment of his GERD.  He may continue to use an over-the-counter NSAID as needed for osteoarthritis.    PHQ-2/PHQ-9 Depression Screening 2/11/2019   Little interest or pleasure in doing things 0   Feeling down, depressed, or hopeless 0   Trouble falling or staying asleep, or sleeping too much -   Feeling tired or having little energy -   Poor appetite or overeating -   Feeling bad about yourself - or that you are a failure or have let yourself or your family down -   Trouble concentrating on things, such as reading the newspaper or watching television -   Moving or speaking so slowly that other people could have noticed. Or the opposite - being so fidgety or restless that you have been moving around a lot more than usual -   Thoughts that you would be better off dead, or of hurting yourself in some way -   Total Score 0         Lab on 02/04/2019   Component Date Value Ref Range Status   • Glucose 02/04/2019 113* 74 - 99 mg/dL Final   • BUN 02/04/2019 20  9 - 20 mg/dL Final   • Creatinine 02/04/2019 0.80  0.66 - 1.25 mg/dL Final   • Sodium 02/04/2019 140  137 - 145 mmol/L Final   • Potassium 02/04/2019 4.3  3.4 - 5.0 mmol/L Final   • Chloride 02/04/2019 100  98 - 107 mmol/L Final   • CO2 02/04/2019 31.0* 22.0 - 30.0 mmol/L Final   • Calcium 02/04/2019 9.3  8.4 - 10.2 mg/dL Final   • Total Protein 02/04/2019 7.3  6.3 - 8.2 g/dL Final   • Albumin 02/04/2019 4.40   3.50 - 5.00 g/dL Final   • ALT (SGPT) 02/04/2019 24  <=50 U/L Final   • AST (SGOT) 02/04/2019 24  17 - 59 U/L Final   • Alkaline Phosphatase 02/04/2019 62  38 - 126 U/L Final   • Total Bilirubin 02/04/2019 0.8  0.2 - 1.3 mg/dL Final   • eGFR Non African Amer 02/04/2019 96  42 - 98 mL/min/1.73 Final   • Globulin 02/04/2019 2.9  2.3 - 3.5 gm/dL Final   • A/G Ratio 02/04/2019 1.5  1.1 - 1.8 g/dL Final   • BUN/Creatinine Ratio 02/04/2019 25.0  7.0 - 25.0 Final   • Anion Gap 02/04/2019 9.0  5.0 - 15.0 mmol/L Final   • Hemoglobin A1C 02/04/2019 6.9* 4 - 5.6 % Final   • Total Cholesterol 02/04/2019 183  150 - 200 mg/dL Final   • Triglycerides 02/04/2019 120  <=150 mg/dL Final   • HDL Cholesterol 02/04/2019 44  40 - 59 mg/dL Final   • LDL Cholesterol  02/04/2019 115* <=100 mg/dL Final   • VLDL Cholesterol 02/04/2019 24  mg/dL Final   • LDL/HDL Ratio 02/04/2019 2.61  0.00 - 3.55 Final   • PSA 02/04/2019 1.740  0.000 - 4.000 ng/mL Final   • Color, UA 02/04/2019 Yellow  Yellow, Straw Final   • Appearance, UA 02/04/2019 Clear  Clear Final   • pH, UA 02/04/2019 7.0  5.5 - 8.0 Final   • Specific Gravity, UA 02/04/2019 1.015  1.005 - 1.030 Final   • Glucose, UA 02/04/2019 Negative  Negative Final   • Ketones, UA 02/04/2019 Negative  Negative Final   • Bilirubin, UA 02/04/2019 Negative  Negative Final   • Blood, UA 02/04/2019 Negative  Negative Final   • Protein, UA 02/04/2019 Negative  Negative Final   • Leuk Esterase, UA 02/04/2019 Negative  Negative Final   • Nitrite, UA 02/04/2019 Negative  Negative Final   • Urobilinogen, UA 02/04/2019 0.2 E.U./dL  0.2 - 1.0 E.U./dL Final   • WBC 02/04/2019 6.34  3.20 - 9.80 10*3/mm3 Final   • RBC 02/04/2019 5.04  4.37 - 5.74 10*6/mm3 Final   • Hemoglobin 02/04/2019 14.6  13.7 - 17.3 g/dL Final   • Hematocrit 02/04/2019 45.5  39.0 - 49.0 % Final   • MCV 02/04/2019 90.3  80.0 - 98.0 fL Final   • MCH 02/04/2019 29.0  26.5 - 34.0 pg Final   • MCHC 02/04/2019 32.1  31.5 - 36.3 g/dL Final   • RDW  02/04/2019 12.9  11.5 - 14.5 % Final   • RDW-SD 02/04/2019 41.6  35.1 - 43.9 fl Final   • MPV 02/04/2019 8.6  8.0 - 12.0 fL Final   • Platelets 02/04/2019 248  150 - 450 10*3/mm3 Final   • Neutrophil % 02/04/2019 43.3  37.0 - 80.0 % Final   • Lymphocyte % 02/04/2019 44.0  10.0 - 50.0 % Final   • Monocyte % 02/04/2019 6.5  0.0 - 12.0 % Final   • Eosinophil % 02/04/2019 5.7  0.0 - 7.0 % Final   • Basophil % 02/04/2019 0.5  0.0 - 2.0 % Final   • Neutrophils, Absolute 02/04/2019 2.75  2.00 - 8.60 10*3/mm3 Final   • Lymphocytes, Absolute 02/04/2019 2.79  0.60 - 4.20 10*3/mm3 Final   • Monocytes, Absolute 02/04/2019 0.41  0.00 - 0.90 10*3/mm3 Final   • Eosinophils, Absolute 02/04/2019 0.36  0.00 - 0.70 10*3/mm3 Final   • Basophils, Absolute 02/04/2019 0.03  0.00 - 0.20 10*3/mm3 Final   ]        .

## 2019-04-15 DIAGNOSIS — E11.9 TYPE 2 DIABETES MELLITUS WITHOUT COMPLICATION, WITHOUT LONG-TERM CURRENT USE OF INSULIN (HCC): Chronic | ICD-10-CM

## 2019-04-16 RX ORDER — METFORMIN HYDROCHLORIDE 500 MG/1
1000 TABLET, EXTENDED RELEASE ORAL EVERY EVENING
Qty: 180 TABLET | Refills: 1 | Status: SHIPPED | OUTPATIENT
Start: 2019-04-16 | End: 2019-10-11 | Stop reason: SDUPTHER

## 2019-05-06 DIAGNOSIS — E78.2 MIXED HYPERLIPIDEMIA: Chronic | ICD-10-CM

## 2019-05-06 DIAGNOSIS — I10 ESSENTIAL HYPERTENSION: Chronic | ICD-10-CM

## 2019-05-06 RX ORDER — LISINOPRIL 10 MG/1
10 TABLET ORAL DAILY
Qty: 90 TABLET | Refills: 1 | Status: SHIPPED | OUTPATIENT
Start: 2019-05-06 | End: 2019-10-11 | Stop reason: SDUPTHER

## 2019-05-06 RX ORDER — PRAVASTATIN SODIUM 20 MG
20 TABLET ORAL NIGHTLY
Qty: 90 TABLET | Refills: 1 | Status: SHIPPED | OUTPATIENT
Start: 2019-05-06 | End: 2019-10-11 | Stop reason: SDUPTHER

## 2019-06-03 ENCOUNTER — LAB (OUTPATIENT)
Dept: LAB | Facility: OTHER | Age: 69
End: 2019-06-03

## 2019-06-03 DIAGNOSIS — I10 ESSENTIAL HYPERTENSION: ICD-10-CM

## 2019-06-03 DIAGNOSIS — E78.2 MIXED HYPERLIPIDEMIA: Chronic | ICD-10-CM

## 2019-06-03 DIAGNOSIS — E11.9 TYPE 2 DIABETES MELLITUS WITHOUT COMPLICATION, WITHOUT LONG-TERM CURRENT USE OF INSULIN (HCC): Chronic | ICD-10-CM

## 2019-06-03 LAB
ALBUMIN SERPL-MCNC: 4 G/DL (ref 3.5–5)
ALBUMIN UR-MCNC: <1.2 MG/L
ALBUMIN/GLOB SERPL: 1.4 G/DL (ref 1.1–1.8)
ALP SERPL-CCNC: 55 U/L (ref 38–126)
ALT SERPL W P-5'-P-CCNC: 17 U/L
ANION GAP SERPL CALCULATED.3IONS-SCNC: 9 MMOL/L (ref 5–15)
ARTICHOKE IGE QN: 122 MG/DL (ref 0–100)
AST SERPL-CCNC: 19 U/L (ref 17–59)
BASOPHILS # BLD AUTO: 0.02 10*3/MM3 (ref 0–0.2)
BASOPHILS NFR BLD AUTO: 0.3 % (ref 0–1.5)
BILIRUB SERPL-MCNC: 0.5 MG/DL (ref 0.2–1.3)
BILIRUB UR QL STRIP: NEGATIVE
BUN BLD-MCNC: 16 MG/DL (ref 7–23)
BUN/CREAT SERPL: 21.1 (ref 7–25)
CALCIUM SPEC-SCNC: 9.4 MG/DL (ref 8.4–10.2)
CHLORIDE SERPL-SCNC: 101 MMOL/L (ref 101–112)
CLARITY UR: CLEAR
CO2 SERPL-SCNC: 29 MMOL/L (ref 22–30)
COLOR UR: YELLOW
CREAT BLD-MCNC: 0.76 MG/DL (ref 0.7–1.3)
CREAT UR-MCNC: 54.9 MG/DL
DEPRECATED RDW RBC AUTO: 41.6 FL (ref 37–54)
EOSINOPHIL # BLD AUTO: 0.37 10*3/MM3 (ref 0–0.4)
EOSINOPHIL NFR BLD AUTO: 6 % (ref 0.3–6.2)
ERYTHROCYTE [DISTWIDTH] IN BLOOD BY AUTOMATED COUNT: 12.7 % (ref 12.3–15.4)
GFR SERPL CREATININE-BSD FRML MDRD: 101 ML/MIN/1.73 (ref 42–98)
GLOBULIN UR ELPH-MCNC: 2.9 GM/DL (ref 2.3–3.5)
GLUCOSE BLD-MCNC: 116 MG/DL (ref 70–99)
GLUCOSE UR STRIP-MCNC: NEGATIVE MG/DL
HBA1C MFR BLD: 6.5 % (ref 4.8–5.6)
HCT VFR BLD AUTO: 44.2 % (ref 37.5–51)
HGB BLD-MCNC: 14.5 G/DL (ref 13–17.7)
HGB UR QL STRIP.AUTO: NEGATIVE
KETONES UR QL STRIP: NEGATIVE
LEUKOCYTE ESTERASE UR QL STRIP.AUTO: NEGATIVE
LYMPHOCYTES # BLD AUTO: 2.54 10*3/MM3 (ref 0.7–3.1)
LYMPHOCYTES NFR BLD AUTO: 41.1 % (ref 19.6–45.3)
MCH RBC QN AUTO: 29.9 PG (ref 26.6–33)
MCHC RBC AUTO-ENTMCNC: 32.8 G/DL (ref 31.5–35.7)
MCV RBC AUTO: 91.1 FL (ref 79–97)
MICROALBUMIN/CREAT UR: NORMAL MG/G
MONOCYTES # BLD AUTO: 0.48 10*3/MM3 (ref 0.1–0.9)
MONOCYTES NFR BLD AUTO: 7.8 % (ref 5–12)
NEUTROPHILS # BLD AUTO: 2.77 10*3/MM3 (ref 1.7–7)
NEUTROPHILS NFR BLD AUTO: 44.8 % (ref 42.7–76)
NITRITE UR QL STRIP: NEGATIVE
PH UR STRIP.AUTO: 7.5 [PH] (ref 5.5–8)
PLATELET # BLD AUTO: 237 10*3/MM3 (ref 140–450)
PMV BLD AUTO: 9.8 FL (ref 6–12)
POTASSIUM BLD-SCNC: 4.1 MMOL/L (ref 3.4–5)
PROT SERPL-MCNC: 6.9 G/DL (ref 6.3–8.6)
PROT UR QL STRIP: NEGATIVE
RBC # BLD AUTO: 4.85 10*6/MM3 (ref 4.14–5.8)
SODIUM BLD-SCNC: 139 MMOL/L (ref 137–145)
SP GR UR STRIP: 1.01 (ref 1–1.03)
T4 FREE SERPL-MCNC: 1.55 NG/DL (ref 0.93–1.7)
TSH SERPL DL<=0.05 MIU/L-ACNC: 1.32 MIU/ML (ref 0.27–4.2)
UROBILINOGEN UR QL STRIP: NORMAL
WBC NRBC COR # BLD: 6.18 10*3/MM3 (ref 3.4–10.8)

## 2019-06-03 PROCEDURE — 84443 ASSAY THYROID STIM HORMONE: CPT | Performed by: INTERNAL MEDICINE

## 2019-06-03 PROCEDURE — 82570 ASSAY OF URINE CREATININE: CPT | Performed by: INTERNAL MEDICINE

## 2019-06-03 PROCEDURE — 85025 COMPLETE CBC W/AUTO DIFF WBC: CPT | Performed by: INTERNAL MEDICINE

## 2019-06-03 PROCEDURE — 80053 COMPREHEN METABOLIC PANEL: CPT | Performed by: INTERNAL MEDICINE

## 2019-06-03 PROCEDURE — 82043 UR ALBUMIN QUANTITATIVE: CPT | Performed by: INTERNAL MEDICINE

## 2019-06-03 PROCEDURE — 84439 ASSAY OF FREE THYROXINE: CPT | Performed by: INTERNAL MEDICINE

## 2019-06-03 PROCEDURE — 81003 URINALYSIS AUTO W/O SCOPE: CPT | Performed by: INTERNAL MEDICINE

## 2019-06-03 PROCEDURE — 83036 HEMOGLOBIN GLYCOSYLATED A1C: CPT | Performed by: INTERNAL MEDICINE

## 2019-06-03 PROCEDURE — 36415 COLL VENOUS BLD VENIPUNCTURE: CPT | Performed by: INTERNAL MEDICINE

## 2019-06-03 PROCEDURE — 83721 ASSAY OF BLOOD LIPOPROTEIN: CPT | Performed by: INTERNAL MEDICINE

## 2019-06-10 ENCOUNTER — OFFICE VISIT (OUTPATIENT)
Dept: FAMILY MEDICINE CLINIC | Facility: CLINIC | Age: 69
End: 2019-06-10

## 2019-06-10 VITALS
SYSTOLIC BLOOD PRESSURE: 128 MMHG | WEIGHT: 156 LBS | TEMPERATURE: 97.8 F | HEIGHT: 70 IN | HEART RATE: 82 BPM | BODY MASS INDEX: 22.33 KG/M2 | DIASTOLIC BLOOD PRESSURE: 66 MMHG

## 2019-06-10 DIAGNOSIS — M15.9 PRIMARY OSTEOARTHRITIS INVOLVING MULTIPLE JOINTS: Chronic | ICD-10-CM

## 2019-06-10 DIAGNOSIS — I10 ESSENTIAL HYPERTENSION: Chronic | ICD-10-CM

## 2019-06-10 DIAGNOSIS — K21.9 GASTROESOPHAGEAL REFLUX DISEASE WITHOUT ESOPHAGITIS: Chronic | ICD-10-CM

## 2019-06-10 DIAGNOSIS — E78.2 MIXED HYPERLIPIDEMIA: Chronic | ICD-10-CM

## 2019-06-10 DIAGNOSIS — E11.9 TYPE 2 DIABETES MELLITUS WITHOUT COMPLICATION, WITHOUT LONG-TERM CURRENT USE OF INSULIN (HCC): Primary | Chronic | ICD-10-CM

## 2019-06-10 PROCEDURE — 99214 OFFICE O/P EST MOD 30 MIN: CPT | Performed by: INTERNAL MEDICINE

## 2019-06-10 RX ORDER — PANTOPRAZOLE SODIUM 40 MG/1
40 TABLET, DELAYED RELEASE ORAL DAILY
Qty: 90 TABLET | Refills: 3 | Status: SHIPPED | OUTPATIENT
Start: 2019-06-10 | End: 2019-10-11 | Stop reason: SDUPTHER

## 2019-06-10 NOTE — PROGRESS NOTES
"Chief Complaint   Patient presents with   • Diabetes     4 mo f/u with labs   • Hypertension     Subjective   Ronald Irizarry is a 69 y.o. male who presents to the office for follow-up and review of labs. He has diabetes and his blood sugar has been controlled.  He is compliant with his medication and diet.  He monitors his blood sugar one time daily.  He has hyperlipidemia and takes pravastatin daily.  He has hypertension, and his blood pressure has been well controlled.  He has osteoarthritis and takes over-the-counter NSAIDs as needed. He takes Protonix daily for treatment of GERD.    The following portions of the patient's history were reviewed and updated as appropriate: allergies, current medications, past family history, past medical history, past social history, past surgical history and problem list.    Review of Systems   Constitutional: Negative for chills, fatigue and fever.   HENT: Negative for congestion, sneezing, sore throat and trouble swallowing.    Eyes: Negative for visual disturbance.   Respiratory: Negative for cough, chest tightness, shortness of breath and wheezing.    Cardiovascular: Negative for chest pain, palpitations and leg swelling.   Gastrointestinal: Negative for abdominal pain, constipation, diarrhea, nausea and vomiting.   Genitourinary: Negative for dysuria, frequency and urgency.   Musculoskeletal: Negative for neck pain.   Skin: Negative for rash.   Neurological: Negative for dizziness, weakness and headaches.   Psychiatric/Behavioral:        Patient denies any feelings of depression and has not felt down, hopeless or lost interest in any activities.   All other systems reviewed and are negative.      Objective   Vitals:    06/10/19 1349 06/10/19 1427   BP: 152/70 128/66   BP Location: Left arm Left arm   Patient Position: Sitting Sitting   Cuff Size: Adult Adult   Pulse: 82    Temp: 97.8 °F (36.6 °C)    TempSrc: Oral    Weight: 70.8 kg (156 lb)    Height: 177.8 cm (70\")  "   PainSc: 0-No pain      Physical Exam   Constitutional: He is oriented to person, place, and time. He appears well-developed and well-nourished. No distress.   HENT:   Head: Normocephalic and atraumatic.   Nose: Nose normal.   Mouth/Throat: Oropharynx is clear and moist. No oropharyngeal exudate.   Eyes: Conjunctivae and EOM are normal. Pupils are equal, round, and reactive to light. No scleral icterus.   Neck: Normal range of motion. Neck supple.   Cardiovascular: Normal rate, regular rhythm and normal heart sounds. Exam reveals no gallop and no friction rub.   No murmur heard.  Pulmonary/Chest: Effort normal and breath sounds normal. No respiratory distress. He has no wheezes. He has no rales.   Abdominal: Soft. Bowel sounds are normal. He exhibits no distension. There is no tenderness. There is no rebound and no guarding.   Musculoskeletal: Normal range of motion. He exhibits no edema.   Lymphadenopathy:     He has no cervical adenopathy.   Neurological: He is alert and oriented to person, place, and time. No cranial nerve deficit.   Skin: Skin is warm and dry. No rash noted.   Psychiatric: He has a normal mood and affect. His behavior is normal. Judgment and thought content normal.   Nursing note and vitals reviewed.      Assessment/Plan   Ronald was seen today for diabetes and hypertension.    Diagnoses and all orders for this visit:    Type 2 diabetes mellitus without complication, without long-term current use of insulin (CMS/[facility])  -     Hemoglobin A1c; Future    Essential hypertension  -     CBC & Differential; Future  -     Comprehensive Metabolic Panel; Future    Mixed hyperlipidemia  -     LDL Cholesterol, Direct; Future    Gastroesophageal reflux disease without esophagitis  -     pantoprazole (PROTONIX) 40 MG EC tablet; Take 1 tablet by mouth Daily.    Primary osteoarthritis involving multiple joints         Labs are reviewed with patient. His glucose is 116.  His hemoglobin A1c is 6.50.  His diabetes  is well controlled.  He will continue with his current diabetic medications. He may monitor blood sugar one time daily.  His LDL is slightly above goal at 122.  He will continue with pravastatin.  His blood pressure is controlled and he will continue with lisinopril.  He will continue with Protonix for treatment of his GERD.  He may continue to use an over-the-counter NSAID as needed for osteoarthritis.    PHQ-2/PHQ-9 Depression Screening 6/10/2019   Little interest or pleasure in doing things 0   Feeling down, depressed, or hopeless 0   Trouble falling or staying asleep, or sleeping too much -   Feeling tired or having little energy -   Poor appetite or overeating -   Feeling bad about yourself - or that you are a failure or have let yourself or your family down -   Trouble concentrating on things, such as reading the newspaper or watching television -   Moving or speaking so slowly that other people could have noticed. Or the opposite - being so fidgety or restless that you have been moving around a lot more than usual -   Thoughts that you would be better off dead, or of hurting yourself in some way -   Total Score 0         Lab on 06/03/2019   Component Date Value Ref Range Status   • Glucose 06/03/2019 116* 70 - 99 mg/dL Final   • BUN 06/03/2019 16  7 - 23 mg/dL Final   • Creatinine 06/03/2019 0.76  0.70 - 1.30 mg/dL Final   • Sodium 06/03/2019 139  137 - 145 mmol/L Final   • Potassium 06/03/2019 4.1  3.4 - 5.0 mmol/L Final   • Chloride 06/03/2019 101  101 - 112 mmol/L Final   • CO2 06/03/2019 29.0  22.0 - 30.0 mmol/L Final   • Calcium 06/03/2019 9.4  8.4 - 10.2 mg/dL Final   • Total Protein 06/03/2019 6.9  6.3 - 8.6 g/dL Final   • Albumin 06/03/2019 4.00  3.50 - 5.00 g/dL Final   • ALT (SGPT) 06/03/2019 17  <=50 U/L Final   • AST (SGOT) 06/03/2019 19  17 - 59 U/L Final   • Alkaline Phosphatase 06/03/2019 55  38 - 126 U/L Final   • Total Bilirubin 06/03/2019 0.5  0.2 - 1.3 mg/dL Final   • eGFR Non African Amer  06/03/2019 101* 42 - 98 mL/min/1.73 Final   • Globulin 06/03/2019 2.9  2.3 - 3.5 gm/dL Final   • A/G Ratio 06/03/2019 1.4  1.1 - 1.8 g/dL Final   • BUN/Creatinine Ratio 06/03/2019 21.1  7.0 - 25.0 Final   • Anion Gap 06/03/2019 9.0  5.0 - 15.0 mmol/L Final   • Hemoglobin A1C 06/03/2019 6.50* 4.80 - 5.60 % Final   • LDL Cholesterol  06/03/2019 122* 0 - 100 mg/dL Final   • Microalbumin/Creatinine Ratio 06/03/2019   mg/g Final    Unable to calculate   • Creatinine, Urine 06/03/2019 54.9  mg/dL Final   • Microalbumin, Urine 06/03/2019 <1.2  mg/L Final   • Free T4 06/03/2019 1.55  0.93 - 1.70 ng/dL Final   • TSH 06/03/2019 1.320  0.270 - 4.200 mIU/mL Final   • Color, UA 06/03/2019 Yellow  Yellow, Straw Final   • Appearance, UA 06/03/2019 Clear  Clear Final   • pH, UA 06/03/2019 7.5  5.5 - 8.0 Final   • Specific Gravity, UA 06/03/2019 1.015  1.005 - 1.030 Final   • Glucose, UA 06/03/2019 Negative  Negative Final   • Ketones, UA 06/03/2019 Negative  Negative Final   • Bilirubin, UA 06/03/2019 Negative  Negative Final   • Blood, UA 06/03/2019 Negative  Negative Final   • Protein, UA 06/03/2019 Negative  Negative Final   • Leuk Esterase, UA 06/03/2019 Negative  Negative Final   • Nitrite, UA 06/03/2019 Negative  Negative Final   • Urobilinogen, UA 06/03/2019 0.2 E.U./dL  0.2 - 1.0 E.U./dL Final   • WBC 06/03/2019 6.18  3.40 - 10.80 10*3/mm3 Final   • RBC 06/03/2019 4.85  4.14 - 5.80 10*6/mm3 Final   • Hemoglobin 06/03/2019 14.5  13.0 - 17.7 g/dL Final   • Hematocrit 06/03/2019 44.2  37.5 - 51.0 % Final   • MCV 06/03/2019 91.1  79.0 - 97.0 fL Final   • MCH 06/03/2019 29.9  26.6 - 33.0 pg Final   • MCHC 06/03/2019 32.8  31.5 - 35.7 g/dL Final   • RDW 06/03/2019 12.7  12.3 - 15.4 % Final   • RDW-SD 06/03/2019 41.6  37.0 - 54.0 fl Final   • MPV 06/03/2019 9.8  6.0 - 12.0 fL Final   • Platelets 06/03/2019 237  140 - 450 10*3/mm3 Final   • Neutrophil % 06/03/2019 44.8  42.7 - 76.0 % Final   • Lymphocyte % 06/03/2019 41.1  19.6 -  45.3 % Final   • Monocyte % 06/03/2019 7.8  5.0 - 12.0 % Final   • Eosinophil % 06/03/2019 6.0  0.3 - 6.2 % Final   • Basophil % 06/03/2019 0.3  0.0 - 1.5 % Final   • Neutrophils, Absolute 06/03/2019 2.77  1.70 - 7.00 10*3/mm3 Final   • Lymphocytes, Absolute 06/03/2019 2.54  0.70 - 3.10 10*3/mm3 Final   • Monocytes, Absolute 06/03/2019 0.48  0.10 - 0.90 10*3/mm3 Final   • Eosinophils, Absolute 06/03/2019 0.37  0.00 - 0.40 10*3/mm3 Final   • Basophils, Absolute 06/03/2019 0.02  0.00 - 0.20 10*3/mm3 Final   ]        .

## 2019-10-01 ENCOUNTER — LAB (OUTPATIENT)
Dept: LAB | Facility: OTHER | Age: 69
End: 2019-10-01

## 2019-10-01 DIAGNOSIS — I10 ESSENTIAL HYPERTENSION: ICD-10-CM

## 2019-10-01 DIAGNOSIS — E11.9 TYPE 2 DIABETES MELLITUS WITHOUT COMPLICATION, WITHOUT LONG-TERM CURRENT USE OF INSULIN (HCC): Chronic | ICD-10-CM

## 2019-10-01 DIAGNOSIS — E78.2 MIXED HYPERLIPIDEMIA: Chronic | ICD-10-CM

## 2019-10-01 LAB
ALBUMIN SERPL-MCNC: 4.2 G/DL (ref 3.5–5)
ALBUMIN/GLOB SERPL: 1.4 G/DL (ref 1.1–1.8)
ALP SERPL-CCNC: 60 U/L (ref 38–126)
ALT SERPL W P-5'-P-CCNC: 16 U/L
ANION GAP SERPL CALCULATED.3IONS-SCNC: 9 MMOL/L (ref 5–15)
AST SERPL-CCNC: 20 U/L (ref 17–59)
BASOPHILS # BLD AUTO: 0.01 10*3/MM3 (ref 0–0.2)
BASOPHILS NFR BLD AUTO: 0.1 % (ref 0–1.5)
BILIRUB SERPL-MCNC: 0.4 MG/DL (ref 0.2–1.3)
BUN BLD-MCNC: 19 MG/DL (ref 7–23)
BUN/CREAT SERPL: 25 (ref 7–25)
CALCIUM SPEC-SCNC: 9.1 MG/DL (ref 8.4–10.2)
CHLORIDE SERPL-SCNC: 102 MMOL/L (ref 101–112)
CO2 SERPL-SCNC: 29 MMOL/L (ref 22–30)
CREAT BLD-MCNC: 0.76 MG/DL (ref 0.7–1.3)
DEPRECATED RDW RBC AUTO: 42.3 FL (ref 37–54)
EOSINOPHIL # BLD AUTO: 0.41 10*3/MM3 (ref 0–0.4)
EOSINOPHIL NFR BLD AUTO: 6 % (ref 0.3–6.2)
ERYTHROCYTE [DISTWIDTH] IN BLOOD BY AUTOMATED COUNT: 12.8 % (ref 12.3–15.4)
GFR SERPL CREATININE-BSD FRML MDRD: 101 ML/MIN/1.73 (ref 42–98)
GLOBULIN UR ELPH-MCNC: 2.9 GM/DL (ref 2.3–3.5)
GLUCOSE BLD-MCNC: 139 MG/DL (ref 70–99)
HCT VFR BLD AUTO: 43.6 % (ref 37.5–51)
HGB BLD-MCNC: 14.3 G/DL (ref 13–17.7)
LYMPHOCYTES # BLD AUTO: 2.54 10*3/MM3 (ref 0.7–3.1)
LYMPHOCYTES NFR BLD AUTO: 37.4 % (ref 19.6–45.3)
MCH RBC QN AUTO: 30 PG (ref 26.6–33)
MCHC RBC AUTO-ENTMCNC: 32.8 G/DL (ref 31.5–35.7)
MCV RBC AUTO: 91.6 FL (ref 79–97)
MONOCYTES # BLD AUTO: 0.4 10*3/MM3 (ref 0.1–0.9)
MONOCYTES NFR BLD AUTO: 5.9 % (ref 5–12)
NEUTROPHILS # BLD AUTO: 3.43 10*3/MM3 (ref 1.7–7)
NEUTROPHILS NFR BLD AUTO: 50.6 % (ref 42.7–76)
PLATELET # BLD AUTO: 220 10*3/MM3 (ref 140–450)
PMV BLD AUTO: 8.7 FL (ref 6–12)
POTASSIUM BLD-SCNC: 4.5 MMOL/L (ref 3.4–5)
PROT SERPL-MCNC: 7.1 G/DL (ref 6.3–8.6)
RBC # BLD AUTO: 4.76 10*6/MM3 (ref 4.14–5.8)
SODIUM BLD-SCNC: 140 MMOL/L (ref 137–145)
WBC NRBC COR # BLD: 6.79 10*3/MM3 (ref 3.4–10.8)

## 2019-10-01 PROCEDURE — 36415 COLL VENOUS BLD VENIPUNCTURE: CPT | Performed by: INTERNAL MEDICINE

## 2019-10-01 PROCEDURE — 80053 COMPREHEN METABOLIC PANEL: CPT | Performed by: INTERNAL MEDICINE

## 2019-10-01 PROCEDURE — 83036 HEMOGLOBIN GLYCOSYLATED A1C: CPT | Performed by: INTERNAL MEDICINE

## 2019-10-01 PROCEDURE — 83721 ASSAY OF BLOOD LIPOPROTEIN: CPT | Performed by: INTERNAL MEDICINE

## 2019-10-01 PROCEDURE — 85025 COMPLETE CBC W/AUTO DIFF WBC: CPT | Performed by: INTERNAL MEDICINE

## 2019-10-02 LAB
ARTICHOKE IGE QN: 125 MG/DL (ref 0–100)
HBA1C MFR BLD: 7 % (ref 4.8–5.6)

## 2019-10-11 ENCOUNTER — OFFICE VISIT (OUTPATIENT)
Dept: FAMILY MEDICINE CLINIC | Facility: CLINIC | Age: 69
End: 2019-10-11

## 2019-10-11 VITALS
DIASTOLIC BLOOD PRESSURE: 82 MMHG | HEART RATE: 71 BPM | WEIGHT: 158 LBS | TEMPERATURE: 97.2 F | BODY MASS INDEX: 22.62 KG/M2 | SYSTOLIC BLOOD PRESSURE: 134 MMHG | HEIGHT: 70 IN

## 2019-10-11 DIAGNOSIS — Z00.00 MEDICARE ANNUAL WELLNESS VISIT, SUBSEQUENT: Primary | ICD-10-CM

## 2019-10-11 DIAGNOSIS — Z23 INFLUENZA VACCINATION ADMINISTERED AT CURRENT VISIT: ICD-10-CM

## 2019-10-11 DIAGNOSIS — M15.9 PRIMARY OSTEOARTHRITIS INVOLVING MULTIPLE JOINTS: Chronic | ICD-10-CM

## 2019-10-11 DIAGNOSIS — Z12.5 SCREENING FOR PROSTATE CANCER: ICD-10-CM

## 2019-10-11 DIAGNOSIS — E78.2 MIXED HYPERLIPIDEMIA: Chronic | ICD-10-CM

## 2019-10-11 DIAGNOSIS — K21.9 GASTROESOPHAGEAL REFLUX DISEASE WITHOUT ESOPHAGITIS: Chronic | ICD-10-CM

## 2019-10-11 DIAGNOSIS — E11.9 TYPE 2 DIABETES MELLITUS WITHOUT COMPLICATION, WITHOUT LONG-TERM CURRENT USE OF INSULIN (HCC): Chronic | ICD-10-CM

## 2019-10-11 DIAGNOSIS — I10 ESSENTIAL HYPERTENSION: Chronic | ICD-10-CM

## 2019-10-11 PROCEDURE — G0008 ADMIN INFLUENZA VIRUS VAC: HCPCS | Performed by: INTERNAL MEDICINE

## 2019-10-11 PROCEDURE — 90653 IIV ADJUVANT VACCINE IM: CPT | Performed by: INTERNAL MEDICINE

## 2019-10-11 PROCEDURE — G0439 PPPS, SUBSEQ VISIT: HCPCS | Performed by: INTERNAL MEDICINE

## 2019-10-11 RX ORDER — METFORMIN HYDROCHLORIDE 500 MG/1
1000 TABLET, EXTENDED RELEASE ORAL EVERY EVENING
Qty: 180 TABLET | Refills: 1 | Status: CANCELLED | OUTPATIENT
Start: 2019-10-11

## 2019-10-11 RX ORDER — METFORMIN HYDROCHLORIDE 500 MG/1
1000 TABLET, EXTENDED RELEASE ORAL EVERY EVENING
Qty: 180 TABLET | Refills: 1 | Status: SHIPPED | OUTPATIENT
Start: 2019-10-11 | End: 2020-02-10 | Stop reason: SDUPTHER

## 2019-10-11 RX ORDER — PRAVASTATIN SODIUM 20 MG
20 TABLET ORAL NIGHTLY
Qty: 90 TABLET | Refills: 1 | Status: SHIPPED | OUTPATIENT
Start: 2019-10-11 | End: 2020-02-10

## 2019-10-11 RX ORDER — LISINOPRIL 10 MG/1
10 TABLET ORAL DAILY
Qty: 90 TABLET | Refills: 1 | Status: SHIPPED | OUTPATIENT
Start: 2019-10-11 | End: 2020-02-10 | Stop reason: SDUPTHER

## 2019-10-11 RX ORDER — PANTOPRAZOLE SODIUM 40 MG/1
40 TABLET, DELAYED RELEASE ORAL DAILY
Qty: 90 TABLET | Refills: 3 | Status: SHIPPED | OUTPATIENT
Start: 2019-10-11 | End: 2020-02-10 | Stop reason: SDUPTHER

## 2019-10-11 RX ORDER — ZOSTER VACCINE RECOMBINANT, ADJUVANTED 50 MCG/0.5
KIT INTRAMUSCULAR
Refills: 1 | COMMUNITY
Start: 2019-09-17 | End: 2019-10-11

## 2019-10-11 NOTE — PROGRESS NOTES
QUICK REFERENCE INFORMATION:  The ABCs of the Annual Wellness Visit    Subsequent Medicare Wellness Visit    HEALTH RISK ASSESSMENT    1950    Recent Hospitalizations:  No hospitalization(s) within the last year..        Current Medical Providers:  Patient Care Team:  Anselmo Manriquez MD as PCP - General (Family Medicine)  Higinio Sanders OD as PCP - Claims Attributed  Higinio Sanders (Optometry)        Smoking Status:  Social History     Tobacco Use   Smoking Status Never Smoker   Smokeless Tobacco Never Used       Alcohol Consumption:  Social History     Substance and Sexual Activity   Alcohol Use No       Depression Screen:   PHQ-2/PHQ-9 Depression Screening 10/11/2019   Little interest or pleasure in doing things 0   Feeling down, depressed, or hopeless 0   Trouble falling or staying asleep, or sleeping too much -   Feeling tired or having little energy -   Poor appetite or overeating -   Feeling bad about yourself - or that you are a failure or have let yourself or your family down -   Trouble concentrating on things, such as reading the newspaper or watching television -   Moving or speaking so slowly that other people could have noticed. Or the opposite - being so fidgety or restless that you have been moving around a lot more than usual -   Thoughts that you would be better off dead, or of hurting yourself in some way -   Total Score 0       Health Habits and Functional and Cognitive Screening:  Functional & Cognitive Status 10/11/2019   Do you have difficulty preparing food and eating? No   Do you have difficulty bathing yourself, getting dressed or grooming yourself? No   Do you have difficulty using the toilet? No   Do you have difficulty moving around from place to place? No   Do you have trouble with steps or getting out of a bed or a chair? No   Current Diet Diabetic Diet   Dental Exam Up to date   Eye Exam Up to date   Exercise (times per week) 4 times per week   Current Exercise Activities  Include Weightlifting   Do you need help using the phone?  No   Are you deaf or do you have serious difficulty hearing?  No   Do you need help with transportation? No   Do you need help shopping? No   Do you need help preparing meals?  No   Do you need help with housework?  No   Do you need help with laundry? No   Do you need help taking your medications? No   Do you need help managing money? No   Do you ever drive or ride in a car without wearing a seat belt? No   Have you felt unusual stress, anger or loneliness in the last month? No   Who do you live with? Alone   If you need help, do you have trouble finding someone available to you? No   Have you been bothered in the last four weeks by sexual problems? No   Do you have difficulty concentrating, remembering or making decisions? No           Does the patient have evidence of cognitive impairment? No    Aspirin use counseling: Does not take ASA      Recent Lab Results:  CMP:  Lab Results   Component Value Date    BUN 19 10/01/2019    CREATININE 0.76 10/01/2019    EGFRIFNONA 101 (H) 10/01/2019    BCR 25.0 10/01/2019     10/01/2019    K 4.5 10/01/2019    CO2 29.0 10/01/2019    CALCIUM 9.1 10/01/2019    ALBUMIN 4.20 10/01/2019    BILITOT 0.4 10/01/2019    ALKPHOS 60 10/01/2019    AST 20 10/01/2019    ALT 16 10/01/2019     Lipid Panel:  Lab Results   Component Value Date    CHOL 183 02/04/2019    TRIG 120 02/04/2019    HDL 44 02/04/2019    VLDL 24 02/04/2019    LDLHDL 2.61 02/04/2019     HbA1c:  Lab Results   Component Value Date    HGBA1C 7.00 (H) 10/01/2019       Visual Acuity:  No exam data present    Age-appropriate Screening Schedule:  Refer to the list below for future screening recommendations based on patient's age, sex and/or medical conditions. Orders for these recommended tests are listed in the plan section. The patient has been provided with a written plan.    Health Maintenance   Topic Date Due   • TDAP/TD VACCINES (1 - Tdap) 01/31/1968   •  DIABETIC FOOT EXAM  06/02/2018   • DIABETIC EYE EXAM  04/06/2019   • INFLUENZA VACCINE  08/01/2019   • COLONOSCOPY  02/11/2020 (Originally 9/2/2016)   • PROSTATE CANCER SCREENING  02/04/2020   • HEMOGLOBIN A1C  04/01/2020   • URINE MICROALBUMIN  06/03/2020   • LIPID PANEL  10/01/2020   • PNEUMOCOCCAL VACCINES (65+ LOW/MEDIUM RISK)  Completed   • ZOSTER VACCINE  Completed        Subjective   History of Present Illness    Ronald Irizarry is a 69 y.o. male who presents for an Subsequent Wellness Visit.    The following portions of the patient's history were reviewed and updated as appropriate: allergies, current medications, past family history, past medical history, past social history, past surgical history and problem list.    Outpatient Medications Prior to Visit   Medication Sig Dispense Refill   • glucose blood (ONE TOUCH ULTRA TEST) test strip USE TO TEST FASTING BLOOD SUGAR EVERY DAY USE AS DIRECTED 100 each 1   • Multiple Vitamins-Minerals (CENTRUM SILVER PO) Take 1 tablet by mouth daily.     • Vitamin E 400 UNITS tablet Take 1 tablet by mouth daily.     • lisinopril (PRINIVIL,ZESTRIL) 10 MG tablet TAKE 1 TABLET BY MOUTH DAILY. 90 tablet 1   • metFORMIN ER (GLUCOPHAGE-XR) 500 MG 24 hr tablet TAKE 2 TABLETS BY MOUTH EVERY EVENING. 180 tablet 1   • pantoprazole (PROTONIX) 40 MG EC tablet Take 1 tablet by mouth Daily. 90 tablet 3   • pravastatin (PRAVACHOL) 20 MG tablet TAKE 1 TABLET BY MOUTH EVERY NIGHT. 90 tablet 1   • SHINGRIX 50 MCG/0.5ML reconstituted suspension INJECT 1 DOSE IM AS DIRECTED. REPEAT 2ND DOSE IN 2-6 MONTHS.  1     No facility-administered medications prior to visit.        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    Review of Systems    Compared to one year ago, the patient feels his physical health is the same.  Compared to one year ago, the patient feels his mental health is the same.    Objective     Physical Exam    Vitals:    10/11/19 1255   BP: 134/82   BP Location: Left arm   Patient Position: Sitting   Cuff Size: Adult   Pulse: 71   Temp: 97.2 °F (36.2 °C)   TempSrc: Oral   Weight: 71.7 kg (158 lb)   Height: 177.8 cm (70\")   PainSc: 0-No pain       Patient's Body mass index is 22.67 kg/m². BMI is within normal parameters. No follow-up required.      Assessment/Plan   Patient Self-Management and Personalized Health Advice  The patient has been provided with information about: diet and exercise and preventive services including:   · Exercise counseling provided, Fall Risk assessment done, Nutrition counseling provided, flu vaccine discussed, Prostate cancer screening and colon cancer screening discussed.    Visit Diagnoses:    ICD-10-CM ICD-9-CM   1. Medicare annual wellness visit, subsequent Z00.00 V70.0   2. Type 2 diabetes mellitus without complication, without long-term current use of insulin (CMS/[facility]) E11.9 250.00   3. Essential hypertension I10 401.9   4. Mixed hyperlipidemia E78.2 272.2   5. Gastroesophageal reflux disease without esophagitis K21.9 530.81   6. Primary osteoarthritis involving multiple joints M15.0 715.09   7. Screening for prostate cancer Z12.5 V76.44   8. Influenza vaccination administered at current visit Z23 V04.81       Orders Placed This Encounter   Procedures   • Fluad Tri 65yr+   • Comprehensive Metabolic Panel     Standing Status:   Future     Standing Expiration Date:   10/11/2020   • Hemoglobin A1c     Standing Status:   Future     Standing Expiration Date:   10/11/2020   • " Lipid Panel     Standing Status:   Future     Standing Expiration Date:   10/11/2020   • T4, Free     Standing Status:   Future     Standing Expiration Date:   10/11/2020   • TSH     Standing Status:   Future     Standing Expiration Date:   10/11/2020   • PSA Screen     Standing Status:   Future     Standing Expiration Date:   10/11/2020   • Urinalysis With Culture If Indicated -     Standing Status:   Future     Standing Expiration Date:   10/11/2020   • CBC & Differential     Standing Status:   Future     Standing Expiration Date:   10/11/2020     Order Specific Question:   Manual Differential     Answer:   No       Outpatient Encounter Medications as of 10/11/2019   Medication Sig Dispense Refill   • glucose blood (ONE TOUCH ULTRA TEST) test strip USE TO TEST FASTING BLOOD SUGAR EVERY DAY USE AS DIRECTED 100 each 1   • lisinopril (PRINIVIL,ZESTRIL) 10 MG tablet Take 1 tablet by mouth Daily. 90 tablet 1   • metFORMIN ER (GLUCOPHAGE-XR) 500 MG 24 hr tablet Take 2 tablets by mouth Every Evening. 180 tablet 1   • Multiple Vitamins-Minerals (CENTRUM SILVER PO) Take 1 tablet by mouth daily.     • pantoprazole (PROTONIX) 40 MG EC tablet Take 1 tablet by mouth Daily. 90 tablet 3   • pravastatin (PRAVACHOL) 20 MG tablet Take 1 tablet by mouth Every Night. 90 tablet 1   • Vitamin E 400 UNITS tablet Take 1 tablet by mouth daily.     • [DISCONTINUED] lisinopril (PRINIVIL,ZESTRIL) 10 MG tablet TAKE 1 TABLET BY MOUTH DAILY. 90 tablet 1   • [DISCONTINUED] metFORMIN ER (GLUCOPHAGE-XR) 500 MG 24 hr tablet TAKE 2 TABLETS BY MOUTH EVERY EVENING. 180 tablet 1   • [DISCONTINUED] pantoprazole (PROTONIX) 40 MG EC tablet Take 1 tablet by mouth Daily. 90 tablet 3   • [DISCONTINUED] pravastatin (PRAVACHOL) 20 MG tablet TAKE 1 TABLET BY MOUTH EVERY NIGHT. 90 tablet 1   • [DISCONTINUED] SHINGRIX 50 MCG/0.5ML reconstituted suspension INJECT 1 DOSE IM AS DIRECTED. REPEAT 2ND DOSE IN 2-6 MONTHS.  1     No facility-administered encounter  medications on file as of 10/11/2019.        Reviewed use of high risk medication in the elderly: yes  Reviewed for potential of harmful drug interactions in the elderly: yes    Follow Up:  Return in about 4 months (around 2/11/2020) for Next scheduled follow up, Or sooner as needed With Labs prior to appointment.     An After Visit Summary and PPPS with all of these plans were given to the patient.

## 2019-10-11 NOTE — PROGRESS NOTES
"Chief Complaint   Patient presents with   • Diabetes     4 mo f/u with labs   • Hypertension   • Medicare Wellness-subsequent     Subjective   Ronald Irizarry is a 69 y.o. male who presents to the office for follow-up and review of labs. He has diabetes and his blood sugar has been controlled.  He is compliant with his medication and diet.  He monitors his blood sugar one time daily.  He has hyperlipidemia and takes pravastatin daily.  He has hypertension, and his blood pressure has been well controlled.  He has osteoarthritis and takes over-the-counter NSAIDs as needed. He takes Protonix daily for treatment of GERD.    The following portions of the patient's history were reviewed and updated as appropriate: allergies, current medications, past family history, past medical history, past social history, past surgical history and problem list.    Review of Systems   Constitutional: Negative for chills, fatigue and fever.   HENT: Negative for congestion, sneezing, sore throat and trouble swallowing.    Eyes: Negative for visual disturbance.   Respiratory: Negative for cough, chest tightness, shortness of breath and wheezing.    Cardiovascular: Negative for chest pain, palpitations and leg swelling.   Gastrointestinal: Negative for abdominal pain, constipation, diarrhea, nausea and vomiting.   Genitourinary: Negative for dysuria, frequency and urgency.   Musculoskeletal: Negative for neck pain.   Skin: Negative for rash.   Neurological: Negative for dizziness, weakness and headaches.   Psychiatric/Behavioral:        Patient denies any feelings of depression and has not felt down, hopeless or lost interest in any activities.   All other systems reviewed and are negative.      Objective   Vitals:    10/11/19 1255   BP: 134/82   BP Location: Left arm   Patient Position: Sitting   Cuff Size: Adult   Pulse: 71   Temp: 97.2 °F (36.2 °C)   TempSrc: Oral   Weight: 71.7 kg (158 lb)   Height: 177.8 cm (70\")   PainSc: 0-No pain "      Body mass index is 22.67 kg/m².    Physical Exam   Constitutional: He is oriented to person, place, and time. He appears well-developed and well-nourished. No distress.   HENT:   Head: Normocephalic and atraumatic.   Nose: Nose normal.   Mouth/Throat: Oropharynx is clear and moist. No oropharyngeal exudate.   Eyes: Conjunctivae and EOM are normal. Pupils are equal, round, and reactive to light. No scleral icterus.   Neck: Normal range of motion. Neck supple.   Cardiovascular: Normal rate, regular rhythm and normal heart sounds. Exam reveals no gallop and no friction rub.   No murmur heard.  Pulmonary/Chest: Effort normal and breath sounds normal. No respiratory distress. He has no wheezes. He has no rales.   Abdominal: Soft. Bowel sounds are normal. He exhibits no distension. There is no tenderness. There is no rebound and no guarding.   Musculoskeletal: Normal range of motion. He exhibits no edema.   Lymphadenopathy:     He has no cervical adenopathy.   Neurological: He is alert and oriented to person, place, and time. No cranial nerve deficit.   Skin: Skin is warm and dry. No rash noted.   Psychiatric: He has a normal mood and affect. His behavior is normal. Judgment and thought content normal.   Nursing note and vitals reviewed.      Assessment/Plan   Ronald was seen today for diabetes, hypertension and medicare wellness-subsequent.    Diagnoses and all orders for this visit:    Medicare annual wellness visit, subsequent    Type 2 diabetes mellitus without complication, without long-term current use of insulin (CMS/[facility])  -     Hemoglobin A1c; Future  -     metFORMIN ER (GLUCOPHAGE-XR) 500 MG 24 hr tablet; Take 2 tablets by mouth Every Evening.    Essential hypertension  -     CBC & Differential; Future  -     Comprehensive Metabolic Panel; Future  -     T4, Free; Future  -     TSH; Future  -     Urinalysis With Culture If Indicated -; Future  -     lisinopril (PRINIVIL,ZESTRIL) 10 MG tablet; Take 1 tablet  by mouth Daily.    Mixed hyperlipidemia  -     Lipid Panel; Future  -     pravastatin (PRAVACHOL) 20 MG tablet; Take 1 tablet by mouth Every Night.    Gastroesophageal reflux disease without esophagitis  -     pantoprazole (PROTONIX) 40 MG EC tablet; Take 1 tablet by mouth Daily.    Primary osteoarthritis involving multiple joints    Screening for prostate cancer  -     PSA Screen; Future    Influenza vaccination administered at current visit  -     Fluad Tri 65yr+         Labs are reviewed with patient. His glucose is 139, and his hemoglobin A1c is 7.0.  His diabetes is controlled.  He will continue with his current diabetic medications. He may monitor blood sugar one time daily.  His LDL is slightly above goal at 125.  He will continue with pravastatin.  His blood pressure is controlled and he will continue with lisinopril.  He will continue with Protonix for treatment of his GERD.  He may continue to use an over-the-counter NSAID as needed for osteoarthritis.    Patient is given a flu shot today.  He refuses colonoscopy or Cologuard screening.     PHQ-2/PHQ-9 Depression Screening 10/11/2019   Little interest or pleasure in doing things 0   Feeling down, depressed, or hopeless 0   Trouble falling or staying asleep, or sleeping too much -   Feeling tired or having little energy -   Poor appetite or overeating -   Feeling bad about yourself - or that you are a failure or have let yourself or your family down -   Trouble concentrating on things, such as reading the newspaper or watching television -   Moving or speaking so slowly that other people could have noticed. Or the opposite - being so fidgety or restless that you have been moving around a lot more than usual -   Thoughts that you would be better off dead, or of hurting yourself in some way -   Total Score 0         Lab on 10/01/2019   Component Date Value Ref Range Status   • Glucose 10/01/2019 139* 70 - 99 mg/dL Final   • BUN 10/01/2019 19  7 - 23 mg/dL  Final   • Creatinine 10/01/2019 0.76  0.70 - 1.30 mg/dL Final   • Sodium 10/01/2019 140  137 - 145 mmol/L Final   • Potassium 10/01/2019 4.5  3.4 - 5.0 mmol/L Final   • Chloride 10/01/2019 102  101 - 112 mmol/L Final   • CO2 10/01/2019 29.0  22.0 - 30.0 mmol/L Final   • Calcium 10/01/2019 9.1  8.4 - 10.2 mg/dL Final   • Total Protein 10/01/2019 7.1  6.3 - 8.6 g/dL Final   • Albumin 10/01/2019 4.20  3.50 - 5.00 g/dL Final   • ALT (SGPT) 10/01/2019 16  <=50 U/L Final   • AST (SGOT) 10/01/2019 20  17 - 59 U/L Final   • Alkaline Phosphatase 10/01/2019 60  38 - 126 U/L Final   • Total Bilirubin 10/01/2019 0.4  0.2 - 1.3 mg/dL Final   • eGFR Non African Amer 10/01/2019 101* 42 - 98 mL/min/1.73 Final   • Globulin 10/01/2019 2.9  2.3 - 3.5 gm/dL Final   • A/G Ratio 10/01/2019 1.4  1.1 - 1.8 g/dL Final   • BUN/Creatinine Ratio 10/01/2019 25.0  7.0 - 25.0 Final   • Anion Gap 10/01/2019 9.0  5.0 - 15.0 mmol/L Final   • Hemoglobin A1C 10/01/2019 7.00* 4.80 - 5.60 % Final   • LDL Cholesterol  10/01/2019 125* 0 - 100 mg/dL Final   • WBC 10/01/2019 6.79  3.40 - 10.80 10*3/mm3 Final   • RBC 10/01/2019 4.76  4.14 - 5.80 10*6/mm3 Final   • Hemoglobin 10/01/2019 14.3  13.0 - 17.7 g/dL Final   • Hematocrit 10/01/2019 43.6  37.5 - 51.0 % Final   • MCV 10/01/2019 91.6  79.0 - 97.0 fL Final   • MCH 10/01/2019 30.0  26.6 - 33.0 pg Final   • MCHC 10/01/2019 32.8  31.5 - 35.7 g/dL Final   • RDW 10/01/2019 12.8  12.3 - 15.4 % Final   • RDW-SD 10/01/2019 42.3  37.0 - 54.0 fl Final   • MPV 10/01/2019 8.7  6.0 - 12.0 fL Final   • Platelets 10/01/2019 220  140 - 450 10*3/mm3 Final   • Neutrophil % 10/01/2019 50.6  42.7 - 76.0 % Final   • Lymphocyte % 10/01/2019 37.4  19.6 - 45.3 % Final   • Monocyte % 10/01/2019 5.9  5.0 - 12.0 % Final   • Eosinophil % 10/01/2019 6.0  0.3 - 6.2 % Final   • Basophil % 10/01/2019 0.1  0.0 - 1.5 % Final   • Neutrophils, Absolute 10/01/2019 3.43  1.70 - 7.00 10*3/mm3 Final   • Lymphocytes, Absolute 10/01/2019 2.54   0.70 - 3.10 10*3/mm3 Final   • Monocytes, Absolute 10/01/2019 0.40  0.10 - 0.90 10*3/mm3 Final   • Eosinophils, Absolute 10/01/2019 0.41* 0.00 - 0.40 10*3/mm3 Final   • Basophils, Absolute 10/01/2019 0.01  0.00 - 0.20 10*3/mm3 Final   ]        .  .

## 2019-12-02 DIAGNOSIS — E11.9 TYPE 2 DIABETES MELLITUS WITHOUT COMPLICATION, WITHOUT LONG-TERM CURRENT USE OF INSULIN (HCC): Primary | Chronic | ICD-10-CM

## 2020-02-04 ENCOUNTER — LAB (OUTPATIENT)
Dept: LAB | Facility: OTHER | Age: 70
End: 2020-02-04

## 2020-02-04 DIAGNOSIS — E11.9 TYPE 2 DIABETES MELLITUS WITHOUT COMPLICATION, WITHOUT LONG-TERM CURRENT USE OF INSULIN (HCC): Chronic | ICD-10-CM

## 2020-02-04 DIAGNOSIS — Z12.5 SCREENING FOR PROSTATE CANCER: ICD-10-CM

## 2020-02-04 DIAGNOSIS — E78.2 MIXED HYPERLIPIDEMIA: Chronic | ICD-10-CM

## 2020-02-04 DIAGNOSIS — I10 ESSENTIAL HYPERTENSION: ICD-10-CM

## 2020-02-04 LAB
ALBUMIN SERPL-MCNC: 4.3 G/DL (ref 3.5–5)
ALBUMIN/GLOB SERPL: 1.5 G/DL (ref 1.1–1.8)
ALP SERPL-CCNC: 64 U/L (ref 38–126)
ALT SERPL W P-5'-P-CCNC: 17 U/L
ANION GAP SERPL CALCULATED.3IONS-SCNC: 7 MMOL/L (ref 5–15)
AST SERPL-CCNC: 21 U/L (ref 17–59)
BASOPHILS # BLD AUTO: 0.02 10*3/MM3 (ref 0–0.2)
BASOPHILS NFR BLD AUTO: 0.3 % (ref 0–1.5)
BILIRUB SERPL-MCNC: 0.5 MG/DL (ref 0.2–1.3)
BILIRUB UR QL STRIP: NEGATIVE
BUN BLD-MCNC: 17 MG/DL (ref 7–23)
BUN/CREAT SERPL: 22.1 (ref 7–25)
CALCIUM SPEC-SCNC: 9.2 MG/DL (ref 8.4–10.2)
CHLORIDE SERPL-SCNC: 104 MMOL/L (ref 101–112)
CHOLEST SERPL-MCNC: 190 MG/DL (ref 150–200)
CLARITY UR: CLEAR
CO2 SERPL-SCNC: 27 MMOL/L (ref 22–30)
COLOR UR: YELLOW
CREAT BLD-MCNC: 0.77 MG/DL (ref 0.7–1.3)
DEPRECATED RDW RBC AUTO: 40.7 FL (ref 37–54)
EOSINOPHIL # BLD AUTO: 0.32 10*3/MM3 (ref 0–0.4)
EOSINOPHIL NFR BLD AUTO: 4.9 % (ref 0.3–6.2)
ERYTHROCYTE [DISTWIDTH] IN BLOOD BY AUTOMATED COUNT: 12.6 % (ref 12.3–15.4)
GFR SERPL CREATININE-BSD FRML MDRD: 100 ML/MIN/1.73 (ref 42–98)
GLOBULIN UR ELPH-MCNC: 2.9 GM/DL (ref 2.3–3.5)
GLUCOSE BLD-MCNC: 130 MG/DL (ref 70–99)
GLUCOSE UR STRIP-MCNC: NEGATIVE MG/DL
HCT VFR BLD AUTO: 44.9 % (ref 37.5–51)
HDLC SERPL-MCNC: 36 MG/DL (ref 40–59)
HGB BLD-MCNC: 14.7 G/DL (ref 13–17.7)
HGB UR QL STRIP.AUTO: NEGATIVE
KETONES UR QL STRIP: NEGATIVE
LDLC SERPL CALC-MCNC: 133 MG/DL
LDLC/HDLC SERPL: 3.69 {RATIO} (ref 0–3.55)
LEUKOCYTE ESTERASE UR QL STRIP.AUTO: NEGATIVE
LYMPHOCYTES # BLD AUTO: 2.78 10*3/MM3 (ref 0.7–3.1)
LYMPHOCYTES NFR BLD AUTO: 42.9 % (ref 19.6–45.3)
MCH RBC QN AUTO: 29.1 PG (ref 26.6–33)
MCHC RBC AUTO-ENTMCNC: 32.7 G/DL (ref 31.5–35.7)
MCV RBC AUTO: 88.9 FL (ref 79–97)
MONOCYTES # BLD AUTO: 0.56 10*3/MM3 (ref 0.1–0.9)
MONOCYTES NFR BLD AUTO: 8.6 % (ref 5–12)
NEUTROPHILS # BLD AUTO: 2.8 10*3/MM3 (ref 1.7–7)
NEUTROPHILS NFR BLD AUTO: 43.3 % (ref 42.7–76)
NITRITE UR QL STRIP: NEGATIVE
PH UR STRIP.AUTO: 5.5 [PH] (ref 5.5–8)
PLATELET # BLD AUTO: 256 10*3/MM3 (ref 140–450)
PMV BLD AUTO: 8.9 FL (ref 6–12)
POTASSIUM BLD-SCNC: 4.3 MMOL/L (ref 3.4–5)
PROT SERPL-MCNC: 7.2 G/DL (ref 6.3–8.6)
PROT UR QL STRIP: NEGATIVE
RBC # BLD AUTO: 5.05 10*6/MM3 (ref 4.14–5.8)
SODIUM BLD-SCNC: 138 MMOL/L (ref 137–145)
SP GR UR STRIP: 1.01 (ref 1–1.03)
TRIGL SERPL-MCNC: 106 MG/DL
UROBILINOGEN UR QL STRIP: NORMAL
VLDLC SERPL-MCNC: 21.2 MG/DL
WBC NRBC COR # BLD: 6.48 10*3/MM3 (ref 3.4–10.8)

## 2020-02-04 PROCEDURE — 36415 COLL VENOUS BLD VENIPUNCTURE: CPT | Performed by: INTERNAL MEDICINE

## 2020-02-04 PROCEDURE — G0103 PSA SCREENING: HCPCS | Performed by: INTERNAL MEDICINE

## 2020-02-04 PROCEDURE — 85025 COMPLETE CBC W/AUTO DIFF WBC: CPT | Performed by: INTERNAL MEDICINE

## 2020-02-04 PROCEDURE — 83036 HEMOGLOBIN GLYCOSYLATED A1C: CPT | Performed by: INTERNAL MEDICINE

## 2020-02-04 PROCEDURE — 80061 LIPID PANEL: CPT | Performed by: INTERNAL MEDICINE

## 2020-02-04 PROCEDURE — 84443 ASSAY THYROID STIM HORMONE: CPT | Performed by: INTERNAL MEDICINE

## 2020-02-04 PROCEDURE — 80053 COMPREHEN METABOLIC PANEL: CPT | Performed by: INTERNAL MEDICINE

## 2020-02-04 PROCEDURE — 84439 ASSAY OF FREE THYROXINE: CPT | Performed by: INTERNAL MEDICINE

## 2020-02-04 PROCEDURE — 81003 URINALYSIS AUTO W/O SCOPE: CPT | Performed by: INTERNAL MEDICINE

## 2020-02-05 LAB
HBA1C MFR BLD: 7.3 % (ref 4.8–5.6)
PSA SERPL-MCNC: 1.44 NG/ML (ref 0–4)
T4 FREE SERPL-MCNC: 1.33 NG/DL (ref 0.93–1.7)
TSH SERPL DL<=0.05 MIU/L-ACNC: 1.8 UIU/ML (ref 0.27–4.2)

## 2020-02-10 ENCOUNTER — OFFICE VISIT (OUTPATIENT)
Dept: FAMILY MEDICINE CLINIC | Facility: CLINIC | Age: 70
End: 2020-02-10

## 2020-02-10 VITALS
SYSTOLIC BLOOD PRESSURE: 136 MMHG | HEART RATE: 88 BPM | HEIGHT: 70 IN | BODY MASS INDEX: 22.42 KG/M2 | TEMPERATURE: 98.1 F | DIASTOLIC BLOOD PRESSURE: 74 MMHG | WEIGHT: 156.6 LBS

## 2020-02-10 DIAGNOSIS — E11.9 TYPE 2 DIABETES MELLITUS WITHOUT COMPLICATION, WITHOUT LONG-TERM CURRENT USE OF INSULIN (HCC): Primary | Chronic | ICD-10-CM

## 2020-02-10 DIAGNOSIS — E78.2 MIXED HYPERLIPIDEMIA: Chronic | ICD-10-CM

## 2020-02-10 DIAGNOSIS — I10 ESSENTIAL HYPERTENSION: Chronic | ICD-10-CM

## 2020-02-10 DIAGNOSIS — M15.9 PRIMARY OSTEOARTHRITIS INVOLVING MULTIPLE JOINTS: Chronic | ICD-10-CM

## 2020-02-10 DIAGNOSIS — K21.9 GASTROESOPHAGEAL REFLUX DISEASE WITHOUT ESOPHAGITIS: Chronic | ICD-10-CM

## 2020-02-10 PROCEDURE — 99214 OFFICE O/P EST MOD 30 MIN: CPT | Performed by: INTERNAL MEDICINE

## 2020-02-10 RX ORDER — PANTOPRAZOLE SODIUM 40 MG/1
40 TABLET, DELAYED RELEASE ORAL DAILY
Qty: 90 TABLET | Refills: 1 | Status: SHIPPED | OUTPATIENT
Start: 2020-02-10 | End: 2020-10-12 | Stop reason: SDUPTHER

## 2020-02-10 RX ORDER — LISINOPRIL 10 MG/1
10 TABLET ORAL DAILY
Qty: 90 TABLET | Refills: 1 | Status: SHIPPED | OUTPATIENT
Start: 2020-02-10 | End: 2020-10-12 | Stop reason: SDUPTHER

## 2020-02-10 RX ORDER — METFORMIN HYDROCHLORIDE 500 MG/1
1000 TABLET, EXTENDED RELEASE ORAL EVERY EVENING
Qty: 180 TABLET | Refills: 1 | Status: SHIPPED | OUTPATIENT
Start: 2020-02-10 | End: 2020-10-12 | Stop reason: SDUPTHER

## 2020-02-10 RX ORDER — ROSUVASTATIN CALCIUM 10 MG/1
10 TABLET, COATED ORAL NIGHTLY
Qty: 90 TABLET | Refills: 1 | Status: SHIPPED | OUTPATIENT
Start: 2020-02-10 | End: 2020-08-27

## 2020-02-10 NOTE — PROGRESS NOTES
"Chief Complaint   Patient presents with   • Diabetes     4 mo f/u with labs   • Hypertension     Subjective   Ronald Irizarry is a 70 y.o. male who presents to the office for follow-up and review of labs. He has diabetes and his blood sugar has been controlled.  He is compliant with his medication and diet.  He monitors his blood sugar one time daily.  He has hyperlipidemia and takes pravastatin daily.  He has hypertension, and his blood pressure has been doing well.  He has osteoarthritis and takes over-the-counter NSAIDs as needed. He takes Protonix daily for treatment of GERD.    The following portions of the patient's history were reviewed and updated as appropriate: allergies, current medications, past family history, past medical history, past social history, past surgical history and problem list.    Review of Systems   Constitutional: Negative for chills, fatigue and fever.   HENT: Negative for congestion, sneezing, sore throat and trouble swallowing.    Eyes: Negative for visual disturbance.   Respiratory: Negative for cough, chest tightness, shortness of breath and wheezing.    Cardiovascular: Negative for chest pain, palpitations and leg swelling.   Gastrointestinal: Negative for abdominal pain, constipation, diarrhea, nausea and vomiting.   Genitourinary: Negative for dysuria, frequency and urgency.   Musculoskeletal: Negative for neck pain.   Skin: Negative for rash.   Neurological: Negative for dizziness, weakness and headaches.   Psychiatric/Behavioral:        Patient denies any feelings of depression and has not felt down, hopeless or lost interest in any activities.   All other systems reviewed and are negative.      Objective   Vitals:    02/10/20 1321   BP: 136/74   BP Location: Left arm   Patient Position: Sitting   Cuff Size: Adult   Pulse: 88   Temp: 98.1 °F (36.7 °C)   TempSrc: Oral   Weight: 71 kg (156 lb 9.6 oz)   Height: 177.8 cm (70\")   PainSc: 0-No pain      Body mass index is 22.47 " kg/m².    Physical Exam   Constitutional: He is oriented to person, place, and time. He appears well-developed and well-nourished. No distress.   HENT:   Head: Normocephalic and atraumatic.   Nose: Nose normal.   Mouth/Throat: Oropharynx is clear and moist. No oropharyngeal exudate.   Eyes: Pupils are equal, round, and reactive to light. Conjunctivae and EOM are normal. No scleral icterus.   Neck: Normal range of motion. Neck supple.   Cardiovascular: Normal rate, regular rhythm and normal heart sounds. Exam reveals no gallop and no friction rub.   No murmur heard.  Pulmonary/Chest: Effort normal and breath sounds normal. No respiratory distress. He has no wheezes. He has no rales.   Abdominal: Soft. Bowel sounds are normal. He exhibits no distension. There is no tenderness. There is no rebound and no guarding.   Musculoskeletal: Normal range of motion. He exhibits no edema.   Lymphadenopathy:     He has no cervical adenopathy.   Neurological: He is alert and oriented to person, place, and time. No cranial nerve deficit.   Skin: Skin is warm and dry. No rash noted.   Psychiatric: He has a normal mood and affect. His behavior is normal. Judgment and thought content normal.   Nursing note and vitals reviewed.      Assessment/Plan   Ronald was seen today for diabetes and hypertension.    Diagnoses and all orders for this visit:    Type 2 diabetes mellitus without complication, without long-term current use of insulin (CMS/[facility])  -     Hemoglobin A1c; Future  -     Microalbumin / Creatinine Urine Ratio - Urine, Clean Catch; Future  -     metFORMIN ER (GLUCOPHAGE-XR) 500 MG 24 hr tablet; Take 2 tablets by mouth Every Evening.    Essential hypertension  -     CBC & Differential; Future  -     Comprehensive Metabolic Panel; Future  -     Cancel: T4, Free; Future  -     Cancel: TSH; Future  -     Urinalysis With Culture If Indicated -; Future  -     lisinopril (PRINIVIL,ZESTRIL) 10 MG tablet; Take 1 tablet by mouth  Daily.    Mixed hyperlipidemia  -     LDL Cholesterol, Direct; Future  -     rosuvastatin (CRESTOR) 10 MG tablet; Take 1 tablet by mouth Every Night.    Gastroesophageal reflux disease without esophagitis  -     pantoprazole (PROTONIX) 40 MG EC tablet; Take 1 tablet by mouth Daily.    Primary osteoarthritis involving multiple joints         Labs are reviewed with patient. His glucose is 130, and his hemoglobin A1c is 7.30.  His diabetes is controlled, although his hemoglobin A1c is slightly elevated compared to previous visits.  He will continue with his current diabetic medications. He may monitor blood sugar one time daily.  His LDL is slightly above goal at 133.  I will discontinue pravastatin and start Crestor 10 mg daily.  This should be more effective for his hyperlipidemia.  His blood pressure is controlled and he will continue with lisinopril.  He will continue with Protonix for treatment of his GERD.  He may continue to use an over-the-counter NSAID as needed for osteoarthritis.  His PSA is normal at 1.44.        PHQ-2/PHQ-9 Depression Screening 2/10/2020   Little interest or pleasure in doing things 0   Feeling down, depressed, or hopeless 0   Trouble falling or staying asleep, or sleeping too much -   Feeling tired or having little energy -   Poor appetite or overeating -   Feeling bad about yourself - or that you are a failure or have let yourself or your family down -   Trouble concentrating on things, such as reading the newspaper or watching television -   Moving or speaking so slowly that other people could have noticed. Or the opposite - being so fidgety or restless that you have been moving around a lot more than usual -   Thoughts that you would be better off dead, or of hurting yourself in some way -   Total Score 0         Lab on 02/04/2020   Component Date Value Ref Range Status   • Glucose 02/04/2020 130* 70 - 99 mg/dL Final   • BUN 02/04/2020 17  7 - 23 mg/dL Final   • Creatinine 02/04/2020  0.77  0.70 - 1.30 mg/dL Final   • Sodium 02/04/2020 138  137 - 145 mmol/L Final   • Potassium 02/04/2020 4.3  3.4 - 5.0 mmol/L Final   • Chloride 02/04/2020 104  101 - 112 mmol/L Final   • CO2 02/04/2020 27.0  22.0 - 30.0 mmol/L Final   • Calcium 02/04/2020 9.2  8.4 - 10.2 mg/dL Final   • Total Protein 02/04/2020 7.2  6.3 - 8.6 g/dL Final   • Albumin 02/04/2020 4.30  3.50 - 5.00 g/dL Final   • ALT (SGPT) 02/04/2020 17  <=50 U/L Final   • AST (SGOT) 02/04/2020 21  17 - 59 U/L Final   • Alkaline Phosphatase 02/04/2020 64  38 - 126 U/L Final   • Total Bilirubin 02/04/2020 0.5  0.2 - 1.3 mg/dL Final   • eGFR Non African Amer 02/04/2020 100* 42 - 98 mL/min/1.73 Final   • Globulin 02/04/2020 2.9  2.3 - 3.5 gm/dL Final   • A/G Ratio 02/04/2020 1.5  1.1 - 1.8 g/dL Final   • BUN/Creatinine Ratio 02/04/2020 22.1  7.0 - 25.0 Final   • Anion Gap 02/04/2020 7.0  5.0 - 15.0 mmol/L Final   • Hemoglobin A1C 02/04/2020 7.30* 4.80 - 5.60 % Final   • Total Cholesterol 02/04/2020 190  150 - 200 mg/dL Final   • Triglycerides 02/04/2020 106  <=150 mg/dL Final   • HDL Cholesterol 02/04/2020 36* 40 - 59 mg/dL Final   • LDL Cholesterol  02/04/2020 133* <=100 mg/dL Final   • VLDL Cholesterol 02/04/2020 21.2  mg/dL Final   • LDL/HDL Ratio 02/04/2020 3.69* 0.00 - 3.55 Final   • Free T4 02/04/2020 1.33  0.93 - 1.70 ng/dL Final   • TSH 02/04/2020 1.800  0.270 - 4.200 uIU/mL Final   • PSA 02/04/2020 1.440  0.000 - 4.000 ng/mL Final   • Color, UA 02/04/2020 Yellow  Yellow, Straw Final   • Appearance, UA 02/04/2020 Clear  Clear Final   • pH, UA 02/04/2020 5.5  5.5 - 8.0 Final   • Specific Gravity, UA 02/04/2020 1.015  1.005 - 1.030 Final   • Glucose, UA 02/04/2020 Negative  Negative Final   • Ketones, UA 02/04/2020 Negative  Negative Final   • Bilirubin, UA 02/04/2020 Negative  Negative Final   • Blood, UA 02/04/2020 Negative  Negative Final   • Protein, UA 02/04/2020 Negative  Negative Final   • Leuk Esterase, UA 02/04/2020 Negative  Negative  Final   • Nitrite, UA 02/04/2020 Negative  Negative Final   • Urobilinogen, UA 02/04/2020 0.2 E.U./dL  0.2 - 1.0 E.U./dL Final   • WBC 02/04/2020 6.48  3.40 - 10.80 10*3/mm3 Final   • RBC 02/04/2020 5.05  4.14 - 5.80 10*6/mm3 Final   • Hemoglobin 02/04/2020 14.7  13.0 - 17.7 g/dL Final   • Hematocrit 02/04/2020 44.9  37.5 - 51.0 % Final   • MCV 02/04/2020 88.9  79.0 - 97.0 fL Final   • MCH 02/04/2020 29.1  26.6 - 33.0 pg Final   • MCHC 02/04/2020 32.7  31.5 - 35.7 g/dL Final   • RDW 02/04/2020 12.6  12.3 - 15.4 % Final   • RDW-SD 02/04/2020 40.7  37.0 - 54.0 fl Final   • MPV 02/04/2020 8.9  6.0 - 12.0 fL Final   • Platelets 02/04/2020 256  140 - 450 10*3/mm3 Final   • Neutrophil % 02/04/2020 43.3  42.7 - 76.0 % Final   • Lymphocyte % 02/04/2020 42.9  19.6 - 45.3 % Final   • Monocyte % 02/04/2020 8.6  5.0 - 12.0 % Final   • Eosinophil % 02/04/2020 4.9  0.3 - 6.2 % Final   • Basophil % 02/04/2020 0.3  0.0 - 1.5 % Final   • Neutrophils, Absolute 02/04/2020 2.80  1.70 - 7.00 10*3/mm3 Final   • Lymphocytes, Absolute 02/04/2020 2.78  0.70 - 3.10 10*3/mm3 Final   • Monocytes, Absolute 02/04/2020 0.56  0.10 - 0.90 10*3/mm3 Final   • Eosinophils, Absolute 02/04/2020 0.32  0.00 - 0.40 10*3/mm3 Final   • Basophils, Absolute 02/04/2020 0.02  0.00 - 0.20 10*3/mm3 Final   ]        .  .

## 2020-06-01 ENCOUNTER — LAB (OUTPATIENT)
Dept: LAB | Facility: OTHER | Age: 70
End: 2020-06-01

## 2020-06-01 DIAGNOSIS — I10 ESSENTIAL HYPERTENSION: ICD-10-CM

## 2020-06-01 DIAGNOSIS — E78.2 MIXED HYPERLIPIDEMIA: Chronic | ICD-10-CM

## 2020-06-01 DIAGNOSIS — E11.9 TYPE 2 DIABETES MELLITUS WITHOUT COMPLICATION, WITHOUT LONG-TERM CURRENT USE OF INSULIN (HCC): Chronic | ICD-10-CM

## 2020-06-01 LAB
ALBUMIN SERPL-MCNC: 4.2 G/DL (ref 3.5–5)
ALBUMIN/GLOB SERPL: 1.4 G/DL (ref 1.1–1.8)
ALP SERPL-CCNC: 50 U/L (ref 38–126)
ALT SERPL W P-5'-P-CCNC: 27 U/L
ANION GAP SERPL CALCULATED.3IONS-SCNC: 7 MMOL/L (ref 5–15)
AST SERPL-CCNC: 27 U/L (ref 17–59)
BASOPHILS # BLD AUTO: 0.02 10*3/MM3 (ref 0–0.2)
BASOPHILS NFR BLD AUTO: 0.3 % (ref 0–1.5)
BILIRUB SERPL-MCNC: 0.5 MG/DL (ref 0.2–1.3)
BILIRUB UR QL STRIP: NEGATIVE
BUN BLD-MCNC: 17 MG/DL (ref 7–23)
BUN/CREAT SERPL: 22.1 (ref 7–25)
CALCIUM SPEC-SCNC: 9.1 MG/DL (ref 8.4–10.2)
CHLORIDE SERPL-SCNC: 99 MMOL/L (ref 101–112)
CLARITY UR: CLEAR
CO2 SERPL-SCNC: 31 MMOL/L (ref 22–30)
COLOR UR: YELLOW
CREAT BLD-MCNC: 0.77 MG/DL (ref 0.7–1.3)
DEPRECATED RDW RBC AUTO: 40.8 FL (ref 37–54)
EOSINOPHIL # BLD AUTO: 0.41 10*3/MM3 (ref 0–0.4)
EOSINOPHIL NFR BLD AUTO: 6 % (ref 0.3–6.2)
ERYTHROCYTE [DISTWIDTH] IN BLOOD BY AUTOMATED COUNT: 12.5 % (ref 12.3–15.4)
GFR SERPL CREATININE-BSD FRML MDRD: 100 ML/MIN/1.73 (ref 42–98)
GLOBULIN UR ELPH-MCNC: 2.9 GM/DL (ref 2.3–3.5)
GLUCOSE BLD-MCNC: 136 MG/DL (ref 70–99)
GLUCOSE UR STRIP-MCNC: NEGATIVE MG/DL
HCT VFR BLD AUTO: 43.5 % (ref 37.5–51)
HGB BLD-MCNC: 14.3 G/DL (ref 13–17.7)
HGB UR QL STRIP.AUTO: NEGATIVE
KETONES UR QL STRIP: NEGATIVE
LEUKOCYTE ESTERASE UR QL STRIP.AUTO: NEGATIVE
LYMPHOCYTES # BLD AUTO: 2.87 10*3/MM3 (ref 0.7–3.1)
LYMPHOCYTES NFR BLD AUTO: 41.7 % (ref 19.6–45.3)
MCH RBC QN AUTO: 30.1 PG (ref 26.6–33)
MCHC RBC AUTO-ENTMCNC: 32.9 G/DL (ref 31.5–35.7)
MCV RBC AUTO: 91.6 FL (ref 79–97)
MONOCYTES # BLD AUTO: 0.45 10*3/MM3 (ref 0.1–0.9)
MONOCYTES NFR BLD AUTO: 6.5 % (ref 5–12)
NEUTROPHILS # BLD AUTO: 3.13 10*3/MM3 (ref 1.7–7)
NEUTROPHILS NFR BLD AUTO: 45.5 % (ref 42.7–76)
NITRITE UR QL STRIP: NEGATIVE
PH UR STRIP.AUTO: 5.5 [PH] (ref 5.5–8)
PLATELET # BLD AUTO: 217 10*3/MM3 (ref 140–450)
PMV BLD AUTO: 8.7 FL (ref 6–12)
POTASSIUM BLD-SCNC: 4.4 MMOL/L (ref 3.4–5)
PROT SERPL-MCNC: 7.1 G/DL (ref 6.3–8.6)
PROT UR QL STRIP: NEGATIVE
RBC # BLD AUTO: 4.75 10*6/MM3 (ref 4.14–5.8)
SODIUM BLD-SCNC: 137 MMOL/L (ref 137–145)
SP GR UR STRIP: <=1.005 (ref 1–1.03)
UROBILINOGEN UR QL STRIP: NORMAL
WBC NRBC COR # BLD: 6.88 10*3/MM3 (ref 3.4–10.8)

## 2020-06-01 PROCEDURE — 85025 COMPLETE CBC W/AUTO DIFF WBC: CPT | Performed by: INTERNAL MEDICINE

## 2020-06-01 PROCEDURE — 80053 COMPREHEN METABOLIC PANEL: CPT | Performed by: INTERNAL MEDICINE

## 2020-06-01 PROCEDURE — 81003 URINALYSIS AUTO W/O SCOPE: CPT | Performed by: INTERNAL MEDICINE

## 2020-06-01 PROCEDURE — 36415 COLL VENOUS BLD VENIPUNCTURE: CPT | Performed by: INTERNAL MEDICINE

## 2020-06-01 PROCEDURE — 82043 UR ALBUMIN QUANTITATIVE: CPT | Performed by: INTERNAL MEDICINE

## 2020-06-01 PROCEDURE — 82570 ASSAY OF URINE CREATININE: CPT | Performed by: INTERNAL MEDICINE

## 2020-06-01 PROCEDURE — 83036 HEMOGLOBIN GLYCOSYLATED A1C: CPT | Performed by: INTERNAL MEDICINE

## 2020-06-01 PROCEDURE — 83721 ASSAY OF BLOOD LIPOPROTEIN: CPT | Performed by: INTERNAL MEDICINE

## 2020-06-02 LAB
ALBUMIN UR-MCNC: <1.2 MG/DL
ARTICHOKE IGE QN: 98 MG/DL (ref 0–100)
CREAT UR-MCNC: 39.4 MG/DL
HBA1C MFR BLD: 7 % (ref 4.8–5.6)
MICROALBUMIN/CREAT UR: NORMAL MG/G{CREAT}

## 2020-06-12 ENCOUNTER — OFFICE VISIT (OUTPATIENT)
Dept: FAMILY MEDICINE CLINIC | Facility: CLINIC | Age: 70
End: 2020-06-12

## 2020-06-12 VITALS
HEART RATE: 83 BPM | DIASTOLIC BLOOD PRESSURE: 68 MMHG | SYSTOLIC BLOOD PRESSURE: 118 MMHG | HEIGHT: 70 IN | BODY MASS INDEX: 22.05 KG/M2 | WEIGHT: 154 LBS

## 2020-06-12 DIAGNOSIS — I10 ESSENTIAL HYPERTENSION: Chronic | ICD-10-CM

## 2020-06-12 DIAGNOSIS — E11.9 TYPE 2 DIABETES MELLITUS WITHOUT COMPLICATION, WITHOUT LONG-TERM CURRENT USE OF INSULIN (HCC): Primary | Chronic | ICD-10-CM

## 2020-06-12 DIAGNOSIS — K21.9 GASTROESOPHAGEAL REFLUX DISEASE WITHOUT ESOPHAGITIS: Chronic | ICD-10-CM

## 2020-06-12 DIAGNOSIS — M15.9 PRIMARY OSTEOARTHRITIS INVOLVING MULTIPLE JOINTS: Chronic | ICD-10-CM

## 2020-06-12 DIAGNOSIS — E78.2 MIXED HYPERLIPIDEMIA: Chronic | ICD-10-CM

## 2020-06-12 PROCEDURE — 99214 OFFICE O/P EST MOD 30 MIN: CPT | Performed by: INTERNAL MEDICINE

## 2020-06-12 NOTE — PROGRESS NOTES
Chief Complaint   Patient presents with   • Diabetes     4 mo f/u w labs   • Hypertension     Subjective   Ronald rIizarry is a 70 y.o. male who presents to the office for follow-up and review of labs. He has diabetes and his blood sugar has been controlled.  He is compliant with his medication and diet.  He monitors his blood sugar one time daily.  He has hyperlipidemia and takes Crestor daily.  He was previously taking pravastatin.  This was changed at the last visit due to decreased efficacy from the pravastatin.  He has tolerated the Crestor without any side effects.  He has hypertension, and his blood pressure has been doing well.  He has osteoarthritis and takes over-the-counter NSAIDs as needed. He takes Protonix daily for treatment of GERD.    The following portions of the patient's history were reviewed and updated as appropriate: allergies, current medications, past family history, past medical history, past social history, past surgical history and problem list.    Review of Systems   Constitutional: Negative for chills, fatigue and fever.   HENT: Negative for congestion, sneezing, sore throat and trouble swallowing.    Eyes: Negative for visual disturbance.   Respiratory: Negative for cough, chest tightness, shortness of breath and wheezing.    Cardiovascular: Negative for chest pain, palpitations and leg swelling.   Gastrointestinal: Negative for abdominal pain, constipation, diarrhea, nausea and vomiting.   Genitourinary: Negative for dysuria, frequency and urgency.   Musculoskeletal: Negative for neck pain.   Skin: Negative for rash.   Neurological: Negative for dizziness, weakness and headaches.   Psychiatric/Behavioral:        Patient denies any feelings of depression and has not felt down, hopeless or lost interest in any activities.   All other systems reviewed and are negative.      Objective   Vitals:    06/12/20 1329   BP: 118/68   BP Location: Left arm   Patient Position: Sitting   Cuff  "Size: Adult   Pulse: 83   Weight: 69.9 kg (154 lb)   Height: 177.8 cm (70\")   PainSc: 0-No pain      Body mass index is 22.1 kg/m².    Physical Exam   Constitutional: He is oriented to person, place, and time. He appears well-developed and well-nourished. No distress.   HENT:   Head: Normocephalic and atraumatic.   Nose: Nose normal.   Mouth/Throat: Oropharynx is clear and moist. No oropharyngeal exudate.   Eyes: Pupils are equal, round, and reactive to light. Conjunctivae and EOM are normal. No scleral icterus.   Neck: Normal range of motion. Neck supple.   Cardiovascular: Normal rate, regular rhythm and normal heart sounds. Exam reveals no gallop and no friction rub.   No murmur heard.  Pulmonary/Chest: Effort normal and breath sounds normal. No respiratory distress. He has no wheezes. He has no rales.   Abdominal: Soft. Bowel sounds are normal. He exhibits no distension. There is no tenderness. There is no rebound and no guarding.   Musculoskeletal: Normal range of motion. He exhibits no edema.   Lymphadenopathy:     He has no cervical adenopathy.   Neurological: He is alert and oriented to person, place, and time. No cranial nerve deficit.   Skin: Skin is warm and dry. No rash noted.   Psychiatric: He has a normal mood and affect. His behavior is normal. Judgment and thought content normal.   Nursing note and vitals reviewed.      Assessment/Plan   Ronald was seen today for diabetes and hypertension.    Diagnoses and all orders for this visit:    Type 2 diabetes mellitus without complication, without long-term current use of insulin (CMS/[facility])  -     Hemoglobin A1c; Future  -     glucose blood (ONE TOUCH ULTRA TEST) test strip; USE TO TEST BLOOD SUGAR ONCE DAILY    Essential hypertension  -     CBC & Differential; Future  -     Comprehensive Metabolic Panel; Future  -     T4, Free; Future  -     TSH; Future  -     Urinalysis With Culture If Indicated -; Future    Mixed hyperlipidemia  -     LDL Cholesterol, " Direct; Future    Primary osteoarthritis involving multiple joints    Gastroesophageal reflux disease without esophagitis         Labs are reviewed with patient. His glucose is 136, and his hemoglobin A1c is 7.0.  This is down from the previous level of 7.30.  His diabetes is controlled. He will continue with his current diabetic medications. He may monitor blood sugar one time daily.  His LDL is at goal at 98.  This has improved from 133 at the last visit.  He will continue with Crestor 10 mg daily.  His blood pressure is controlled and he will continue with lisinopril.  He will continue with Protonix for treatment of his GERD.  He may continue to use an over-the-counter NSAID as needed for osteoarthritis.        PHQ-2/PHQ-9 Depression Screening 6/12/2020   Little interest or pleasure in doing things 0   Feeling down, depressed, or hopeless 0   Trouble falling or staying asleep, or sleeping too much -   Feeling tired or having little energy -   Poor appetite or overeating -   Feeling bad about yourself - or that you are a failure or have let yourself or your family down -   Trouble concentrating on things, such as reading the newspaper or watching television -   Moving or speaking so slowly that other people could have noticed. Or the opposite - being so fidgety or restless that you have been moving around a lot more than usual -   Thoughts that you would be better off dead, or of hurting yourself in some way -   Total Score 0         Lab on 06/01/2020   Component Date Value Ref Range Status   • Glucose 06/01/2020 136* 70 - 99 mg/dL Final   • BUN 06/01/2020 17  7 - 23 mg/dL Final   • Creatinine 06/01/2020 0.77  0.70 - 1.30 mg/dL Final   • Sodium 06/01/2020 137  137 - 145 mmol/L Final   • Potassium 06/01/2020 4.4  3.4 - 5.0 mmol/L Final   • Chloride 06/01/2020 99* 101 - 112 mmol/L Final   • CO2 06/01/2020 31.0* 22.0 - 30.0 mmol/L Final   • Calcium 06/01/2020 9.1  8.4 - 10.2 mg/dL Final   • Total Protein 06/01/2020  7.1  6.3 - 8.6 g/dL Final   • Albumin 06/01/2020 4.20  3.50 - 5.00 g/dL Final   • ALT (SGPT) 06/01/2020 27  <=50 U/L Final   • AST (SGOT) 06/01/2020 27  17 - 59 U/L Final   • Alkaline Phosphatase 06/01/2020 50  38 - 126 U/L Final   • Total Bilirubin 06/01/2020 0.5  0.2 - 1.3 mg/dL Final   • eGFR Non African Amer 06/01/2020 100* 42 - 98 mL/min/1.73 Final   • Globulin 06/01/2020 2.9  2.3 - 3.5 gm/dL Final   • A/G Ratio 06/01/2020 1.4  1.1 - 1.8 g/dL Final   • BUN/Creatinine Ratio 06/01/2020 22.1  7.0 - 25.0 Final   • Anion Gap 06/01/2020 7.0  5.0 - 15.0 mmol/L Final   • Color, UA 06/01/2020 Yellow  Yellow, Straw Final   • Appearance, UA 06/01/2020 Clear  Clear Final   • pH, UA 06/01/2020 5.5  5.5 - 8.0 Final   • Specific Gravity, UA 06/01/2020 <=1.005  1.005 - 1.030 Final   • Glucose, UA 06/01/2020 Negative  Negative Final   • Ketones, UA 06/01/2020 Negative  Negative Final   • Bilirubin, UA 06/01/2020 Negative  Negative Final   • Blood, UA 06/01/2020 Negative  Negative Final   • Protein, UA 06/01/2020 Negative  Negative Final   • Leuk Esterase, UA 06/01/2020 Negative  Negative Final   • Nitrite, UA 06/01/2020 Negative  Negative Final   • Urobilinogen, UA 06/01/2020 0.2 E.U./dL  0.2 - 1.0 E.U./dL Final   • Hemoglobin A1C 06/01/2020 7.00* 4.80 - 5.60 % Final   • LDL Cholesterol  06/01/2020 98  0 - 100 mg/dL Final   • Microalbumin/Creatinine Ratio 06/01/2020    Final    Unable to calculate   • Creatinine, Urine 06/01/2020 39.4  mg/dL Final   • Microalbumin, Urine 06/01/2020 <1.2  mg/dL Final   • WBC 06/01/2020 6.88  3.40 - 10.80 10*3/mm3 Final   • RBC 06/01/2020 4.75  4.14 - 5.80 10*6/mm3 Final   • Hemoglobin 06/01/2020 14.3  13.0 - 17.7 g/dL Final   • Hematocrit 06/01/2020 43.5  37.5 - 51.0 % Final   • MCV 06/01/2020 91.6  79.0 - 97.0 fL Final   • MCH 06/01/2020 30.1  26.6 - 33.0 pg Final   • MCHC 06/01/2020 32.9  31.5 - 35.7 g/dL Final   • RDW 06/01/2020 12.5  12.3 - 15.4 % Final   • RDW-SD 06/01/2020 40.8  37.0 -  54.0 fl Final   • MPV 06/01/2020 8.7  6.0 - 12.0 fL Final   • Platelets 06/01/2020 217  140 - 450 10*3/mm3 Final   • Neutrophil % 06/01/2020 45.5  42.7 - 76.0 % Final   • Lymphocyte % 06/01/2020 41.7  19.6 - 45.3 % Final   • Monocyte % 06/01/2020 6.5  5.0 - 12.0 % Final   • Eosinophil % 06/01/2020 6.0  0.3 - 6.2 % Final   • Basophil % 06/01/2020 0.3  0.0 - 1.5 % Final   • Neutrophils, Absolute 06/01/2020 3.13  1.70 - 7.00 10*3/mm3 Final   • Lymphocytes, Absolute 06/01/2020 2.87  0.70 - 3.10 10*3/mm3 Final   • Monocytes, Absolute 06/01/2020 0.45  0.10 - 0.90 10*3/mm3 Final   • Eosinophils, Absolute 06/01/2020 0.41* 0.00 - 0.40 10*3/mm3 Final   • Basophils, Absolute 06/01/2020 0.02  0.00 - 0.20 10*3/mm3 Final   ]        .  .

## 2020-08-25 DIAGNOSIS — E78.2 MIXED HYPERLIPIDEMIA: Chronic | ICD-10-CM

## 2020-08-27 RX ORDER — ROSUVASTATIN CALCIUM 10 MG/1
10 TABLET, COATED ORAL NIGHTLY
Qty: 90 TABLET | Refills: 1 | Status: SHIPPED | OUTPATIENT
Start: 2020-08-27 | End: 2020-10-12 | Stop reason: SDUPTHER

## 2020-10-05 ENCOUNTER — LAB (OUTPATIENT)
Dept: LAB | Facility: OTHER | Age: 70
End: 2020-10-05

## 2020-10-05 DIAGNOSIS — E11.9 TYPE 2 DIABETES MELLITUS WITHOUT COMPLICATION, WITHOUT LONG-TERM CURRENT USE OF INSULIN (HCC): Chronic | ICD-10-CM

## 2020-10-05 DIAGNOSIS — E78.2 MIXED HYPERLIPIDEMIA: Chronic | ICD-10-CM

## 2020-10-05 DIAGNOSIS — I10 ESSENTIAL HYPERTENSION: ICD-10-CM

## 2020-10-05 LAB
ALBUMIN SERPL-MCNC: 4.2 G/DL (ref 3.5–5)
ALBUMIN/GLOB SERPL: 1.4 G/DL (ref 1.1–1.8)
ALP SERPL-CCNC: 74 U/L (ref 38–126)
ALT SERPL W P-5'-P-CCNC: 22 U/L
ANION GAP SERPL CALCULATED.3IONS-SCNC: 6 MMOL/L (ref 5–15)
AST SERPL-CCNC: 23 U/L (ref 17–59)
BASOPHILS # BLD AUTO: 0.02 10*3/MM3 (ref 0–0.2)
BASOPHILS NFR BLD AUTO: 0.3 % (ref 0–1.5)
BILIRUB SERPL-MCNC: 0.5 MG/DL (ref 0.2–1.3)
BILIRUB UR QL STRIP: NEGATIVE
BUN SERPL-MCNC: 16 MG/DL (ref 7–23)
BUN/CREAT SERPL: 18.8 (ref 7–25)
CALCIUM SPEC-SCNC: 9 MG/DL (ref 8.4–10.2)
CHLORIDE SERPL-SCNC: 101 MMOL/L (ref 101–112)
CLARITY UR: ABNORMAL
CO2 SERPL-SCNC: 31 MMOL/L (ref 22–30)
COLOR UR: YELLOW
CREAT SERPL-MCNC: 0.85 MG/DL (ref 0.7–1.3)
DEPRECATED RDW RBC AUTO: 41.1 FL (ref 37–54)
EOSINOPHIL # BLD AUTO: 0.39 10*3/MM3 (ref 0–0.4)
EOSINOPHIL NFR BLD AUTO: 5.6 % (ref 0.3–6.2)
ERYTHROCYTE [DISTWIDTH] IN BLOOD BY AUTOMATED COUNT: 12.7 % (ref 12.3–15.4)
GFR SERPL CREATININE-BSD FRML MDRD: 89 ML/MIN/1.73 (ref 42–98)
GLOBULIN UR ELPH-MCNC: 3.1 GM/DL (ref 2.3–3.5)
GLUCOSE SERPL-MCNC: 131 MG/DL (ref 70–99)
GLUCOSE UR STRIP-MCNC: NEGATIVE MG/DL
HCT VFR BLD AUTO: 44.9 % (ref 37.5–51)
HGB BLD-MCNC: 14.4 G/DL (ref 13–17.7)
HGB UR QL STRIP.AUTO: NEGATIVE
KETONES UR QL STRIP: NEGATIVE
LEUKOCYTE ESTERASE UR QL STRIP.AUTO: NEGATIVE
LYMPHOCYTES # BLD AUTO: 2.38 10*3/MM3 (ref 0.7–3.1)
LYMPHOCYTES NFR BLD AUTO: 34 % (ref 19.6–45.3)
MCH RBC QN AUTO: 29.3 PG (ref 26.6–33)
MCHC RBC AUTO-ENTMCNC: 32.1 G/DL (ref 31.5–35.7)
MCV RBC AUTO: 91.3 FL (ref 79–97)
MONOCYTES # BLD AUTO: 0.55 10*3/MM3 (ref 0.1–0.9)
MONOCYTES NFR BLD AUTO: 7.8 % (ref 5–12)
NEUTROPHILS NFR BLD AUTO: 3.67 10*3/MM3 (ref 1.7–7)
NEUTROPHILS NFR BLD AUTO: 52.3 % (ref 42.7–76)
NITRITE UR QL STRIP: NEGATIVE
PH UR STRIP.AUTO: 7 [PH] (ref 5.5–8)
PLATELET # BLD AUTO: 259 10*3/MM3 (ref 140–450)
PMV BLD AUTO: 8.3 FL (ref 6–12)
POTASSIUM SERPL-SCNC: 4.4 MMOL/L (ref 3.4–5)
PROT SERPL-MCNC: 7.3 G/DL (ref 6.3–8.6)
PROT UR QL STRIP: NEGATIVE
RBC # BLD AUTO: 4.92 10*6/MM3 (ref 4.14–5.8)
SODIUM SERPL-SCNC: 138 MMOL/L (ref 137–145)
SP GR UR STRIP: 1.01 (ref 1–1.03)
UROBILINOGEN UR QL STRIP: ABNORMAL
WBC # BLD AUTO: 7.01 10*3/MM3 (ref 3.4–10.8)

## 2020-10-05 PROCEDURE — 85025 COMPLETE CBC W/AUTO DIFF WBC: CPT | Performed by: INTERNAL MEDICINE

## 2020-10-05 PROCEDURE — 36415 COLL VENOUS BLD VENIPUNCTURE: CPT | Performed by: INTERNAL MEDICINE

## 2020-10-05 PROCEDURE — 81003 URINALYSIS AUTO W/O SCOPE: CPT | Performed by: INTERNAL MEDICINE

## 2020-10-05 PROCEDURE — 84439 ASSAY OF FREE THYROXINE: CPT | Performed by: INTERNAL MEDICINE

## 2020-10-05 PROCEDURE — 84443 ASSAY THYROID STIM HORMONE: CPT | Performed by: INTERNAL MEDICINE

## 2020-10-05 PROCEDURE — 83721 ASSAY OF BLOOD LIPOPROTEIN: CPT | Performed by: INTERNAL MEDICINE

## 2020-10-05 PROCEDURE — 80053 COMPREHEN METABOLIC PANEL: CPT | Performed by: INTERNAL MEDICINE

## 2020-10-05 PROCEDURE — 83036 HEMOGLOBIN GLYCOSYLATED A1C: CPT | Performed by: INTERNAL MEDICINE

## 2020-10-06 LAB
ARTICHOKE IGE QN: 79 MG/DL (ref 0–100)
HBA1C MFR BLD: 7.24 % (ref 4.8–5.6)
T4 FREE SERPL-MCNC: 1.49 NG/DL (ref 0.93–1.7)
TSH SERPL DL<=0.05 MIU/L-ACNC: 0.99 UIU/ML (ref 0.27–4.2)

## 2020-10-12 ENCOUNTER — OFFICE VISIT (OUTPATIENT)
Dept: FAMILY MEDICINE CLINIC | Facility: CLINIC | Age: 70
End: 2020-10-12

## 2020-10-12 VITALS
WEIGHT: 154 LBS | OXYGEN SATURATION: 99 % | HEIGHT: 70 IN | HEART RATE: 65 BPM | BODY MASS INDEX: 22.05 KG/M2 | TEMPERATURE: 98.6 F | SYSTOLIC BLOOD PRESSURE: 120 MMHG | DIASTOLIC BLOOD PRESSURE: 74 MMHG

## 2020-10-12 DIAGNOSIS — Z12.5 SCREENING FOR PROSTATE CANCER: ICD-10-CM

## 2020-10-12 DIAGNOSIS — E11.9 TYPE 2 DIABETES MELLITUS WITHOUT COMPLICATION, WITHOUT LONG-TERM CURRENT USE OF INSULIN (HCC): Chronic | ICD-10-CM

## 2020-10-12 DIAGNOSIS — M15.9 PRIMARY OSTEOARTHRITIS INVOLVING MULTIPLE JOINTS: Chronic | ICD-10-CM

## 2020-10-12 DIAGNOSIS — Z00.00 MEDICARE ANNUAL WELLNESS VISIT, SUBSEQUENT: Primary | ICD-10-CM

## 2020-10-12 DIAGNOSIS — E78.2 MIXED HYPERLIPIDEMIA: Chronic | ICD-10-CM

## 2020-10-12 DIAGNOSIS — K21.9 GASTROESOPHAGEAL REFLUX DISEASE WITHOUT ESOPHAGITIS: Chronic | ICD-10-CM

## 2020-10-12 DIAGNOSIS — I10 ESSENTIAL HYPERTENSION: Chronic | ICD-10-CM

## 2020-10-12 DIAGNOSIS — Z23 INFLUENZA VACCINATION ADMINISTERED AT CURRENT VISIT: ICD-10-CM

## 2020-10-12 PROCEDURE — G0008 ADMIN INFLUENZA VIRUS VAC: HCPCS | Performed by: INTERNAL MEDICINE

## 2020-10-12 PROCEDURE — G0439 PPPS, SUBSEQ VISIT: HCPCS | Performed by: INTERNAL MEDICINE

## 2020-10-12 PROCEDURE — 90694 VACC AIIV4 NO PRSRV 0.5ML IM: CPT | Performed by: INTERNAL MEDICINE

## 2020-10-12 RX ORDER — ROSUVASTATIN CALCIUM 10 MG/1
10 TABLET, COATED ORAL NIGHTLY
Qty: 90 TABLET | Refills: 1 | Status: SHIPPED | OUTPATIENT
Start: 2020-10-12 | End: 2021-02-22 | Stop reason: SDUPTHER

## 2020-10-12 RX ORDER — LISINOPRIL 10 MG/1
10 TABLET ORAL DAILY
Qty: 90 TABLET | Refills: 1 | Status: SHIPPED | OUTPATIENT
Start: 2020-10-12 | End: 2021-02-22 | Stop reason: SDUPTHER

## 2020-10-12 RX ORDER — PANTOPRAZOLE SODIUM 40 MG/1
40 TABLET, DELAYED RELEASE ORAL DAILY
Qty: 90 TABLET | Refills: 1 | Status: SHIPPED | OUTPATIENT
Start: 2020-10-12 | End: 2021-02-22 | Stop reason: SDUPTHER

## 2020-10-12 RX ORDER — METFORMIN HYDROCHLORIDE 500 MG/1
1000 TABLET, EXTENDED RELEASE ORAL EVERY EVENING
Qty: 180 TABLET | Refills: 1 | Status: SHIPPED | OUTPATIENT
Start: 2020-10-12 | End: 2021-02-22 | Stop reason: SDUPTHER

## 2020-10-12 NOTE — PROGRESS NOTES
QUICK REFERENCE INFORMATION:  The ABCs of the Annual Wellness Visit    Subsequent Medicare Wellness Visit    HEALTH RISK ASSESSMENT    1950    Recent Hospitalizations:  No hospitalization(s) within the last year..        Current Medical Providers:  Patient Care Team:  Anselmo Manriquez MD as PCP - General (Family Medicine)  Higinio Sanders (Optometry)        Smoking Status:  Social History     Tobacco Use   Smoking Status Never Smoker   Smokeless Tobacco Never Used       Alcohol Consumption:  Social History     Substance and Sexual Activity   Alcohol Use No       Depression Screen:   PHQ-2/PHQ-9 Depression Screening 10/12/2020   Little interest or pleasure in doing things 0   Feeling down, depressed, or hopeless 0   Trouble falling or staying asleep, or sleeping too much -   Feeling tired or having little energy -   Poor appetite or overeating -   Feeling bad about yourself - or that you are a failure or have let yourself or your family down -   Trouble concentrating on things, such as reading the newspaper or watching television -   Moving or speaking so slowly that other people could have noticed. Or the opposite - being so fidgety or restless that you have been moving around a lot more than usual -   Thoughts that you would be better off dead, or of hurting yourself in some way -   Total Score 0       Health Habits and Functional and Cognitive Screening:  Functional & Cognitive Status 10/12/2020   Do you have difficulty preparing food and eating? No   Do you have difficulty bathing yourself, getting dressed or grooming yourself? No   Do you have difficulty using the toilet? No   Do you have difficulty moving around from place to place? No   Do you have trouble with steps or getting out of a bed or a chair? No   Current Diet Well Balanced Diet   Dental Exam Up to date   Eye Exam Up to date        Eye Exam Comment -   Exercise (times per week) 3 times per week   Current Exercise Activities Include Yard Work   Do  you need help using the phone?  No   Are you deaf or do you have serious difficulty hearing?  No   Do you need help with transportation? No   Do you need help shopping? No   Do you need help preparing meals?  No   Do you need help with housework?  No   Do you need help with laundry? No   Do you need help taking your medications? No   Do you need help managing money? No   Do you ever drive or ride in a car without wearing a seat belt? No   Have you felt unusual stress, anger or loneliness in the last month? No   Who do you live with? Alone   If you need help, do you have trouble finding someone available to you? No   Have you been bothered in the last four weeks by sexual problems? No   Do you have difficulty concentrating, remembering or making decisions? No           Does the patient have evidence of cognitive impairment? No    Aspirin use counseling: Does not take ASA      Recent Lab Results:  CMP:  Lab Results   Component Value Date    BUN 16 10/05/2020    CREATININE 0.85 10/05/2020    EGFRIFNONA 89 10/05/2020    BCR 18.8 10/05/2020     10/05/2020    K 4.4 10/05/2020    CO2 31.0 (H) 10/05/2020    CALCIUM 9.0 10/05/2020    ALBUMIN 4.20 10/05/2020    BILITOT 0.5 10/05/2020    ALKPHOS 74 10/05/2020    AST 23 10/05/2020    ALT 22 10/05/2020     Lipid Panel:  Lab Results   Component Value Date    CHOL 190 02/04/2020    TRIG 106 02/04/2020    HDL 36 (L) 02/04/2020    VLDL 21.2 02/04/2020    LDLHDL 3.69 (H) 02/04/2020     HbA1c:  Lab Results   Component Value Date    HGBA1C 7.24 (H) 10/05/2020       Visual Acuity:  No exam data present    Age-appropriate Screening Schedule:  Refer to the list below for future screening recommendations based on patient's age, sex and/or medical conditions. Orders for these recommended tests are listed in the plan section. The patient has been provided with a written plan.    Health Maintenance   Topic Date Due   • COLONOSCOPY  1950   • TDAP/TD VACCINES (1 - Tdap) 01/31/1968    • DIABETIC FOOT EXAM  06/02/2018   • INFLUENZA VACCINE  08/01/2020   • PROSTATE CANCER SCREENING  02/04/2021   • DIABETIC EYE EXAM  02/25/2021   • HEMOGLOBIN A1C  04/05/2021   • URINE MICROALBUMIN  06/01/2021   • LIPID PANEL  10/05/2021   • ZOSTER VACCINE  Completed        Subjective   History of Present Illness    Ronald Irizarry is a 70 y.o. male who presents for an Subsequent Wellness Visit.    The following portions of the patient's history were reviewed and updated as appropriate: allergies, current medications, past family history, past medical history, past social history, past surgical history and problem list.    Outpatient Medications Prior to Visit   Medication Sig Dispense Refill   • glucose blood (ONE TOUCH ULTRA TEST) test strip USE TO TEST BLOOD SUGAR ONCE DAILY 100 each 1   • Multiple Vitamins-Minerals (CENTRUM SILVER PO) Take 1 tablet by mouth daily.     • Vitamin E 400 UNITS tablet Take 1 tablet by mouth daily.     • lisinopril (PRINIVIL,ZESTRIL) 10 MG tablet Take 1 tablet by mouth Daily. 90 tablet 1   • metFORMIN ER (GLUCOPHAGE-XR) 500 MG 24 hr tablet Take 2 tablets by mouth Every Evening. 180 tablet 1   • pantoprazole (PROTONIX) 40 MG EC tablet Take 1 tablet by mouth Daily. 90 tablet 1   • rosuvastatin (CRESTOR) 10 MG tablet TAKE 1 TABLET BY MOUTH EVERY NIGHT. 90 tablet 1     No facility-administered medications prior to visit.        Patient Active Problem List   Diagnosis   • Type 2 diabetes mellitus without complication, without long-term current use of insulin (CMS/[facility])   • Mixed hyperlipidemia   • Essential hypertension   • Primary osteoarthritis involving multiple joints   • Gastroesophageal reflux disease without esophagitis   • No diabetic retinopathy in both eyes       Advance Care Planning:  ACP Discussion Status: ACP discussion was held with the patient during this visit. Patient does not have an advance directive, declines further assistance.      Identification of Risk  "Factors:  Risk factors include: Hypertension, Hyperlipidemia.    Review of Systems    Compared to one year ago, the patient feels his physical health is the same.  Compared to one year ago, the patient feels his mental health is the same.    Objective     Physical Exam    Vitals:    10/12/20 1308   BP: 120/74   Pulse: 65   Temp: 98.6 °F (37 °C)   TempSrc: Oral   SpO2: 99%   Weight: 69.9 kg (154 lb)   Height: 177.8 cm (70\")   PainSc: 0-No pain       Patient's Body mass index is 22.1 kg/m². BMI is within normal parameters. No follow-up required.      Assessment/Plan   Patient Self-Management and Personalized Health Advice  The patient has been provided with information about: diet and exercise and preventive services including:   · Exercise counseling provided, Fall Risk assessment done, Nutrition counseling provided, flu vaccine discussed, Prostate cancer screening and colon cancer screening discussed.    Visit Diagnoses:    ICD-10-CM ICD-9-CM   1. Medicare annual wellness visit, subsequent  Z00.00 V70.0   2. Type 2 diabetes mellitus without complication, without long-term current use of insulin (CMS/[facility])  E11.9 250.00   3. Essential hypertension  I10 401.9   4. Mixed hyperlipidemia  E78.2 272.2   5. Gastroesophageal reflux disease without esophagitis  K21.9 530.81   6. Primary osteoarthritis involving multiple joints  M89.49 715.98   7. Screening for prostate cancer  Z12.5 V76.44   8. Influenza vaccination administered at current visit  Z23 V04.81       Orders Placed This Encounter   Procedures   • Fluad Quad >65 years   • Comprehensive Metabolic Panel     Standing Status:   Future     Standing Expiration Date:   10/12/2021   • Hemoglobin A1c     Standing Status:   Future     Standing Expiration Date:   10/12/2021   • Lipid Panel     Standing Status:   Future     Standing Expiration Date:   10/12/2021   • PSA Screen     Standing Status:   Future     Standing Expiration Date:   10/12/2021   • CBC & Differential     " Standing Status:   Future     Standing Expiration Date:   10/12/2021     Order Specific Question:   Manual Differential     Answer:   No       Outpatient Encounter Medications as of 10/12/2020   Medication Sig Dispense Refill   • glucose blood (ONE TOUCH ULTRA TEST) test strip USE TO TEST BLOOD SUGAR ONCE DAILY 100 each 1   • lisinopril (PRINIVIL,ZESTRIL) 10 MG tablet Take 1 tablet by mouth Daily. 90 tablet 1   • metFORMIN ER (GLUCOPHAGE-XR) 500 MG 24 hr tablet Take 2 tablets by mouth Every Evening. 180 tablet 1   • Multiple Vitamins-Minerals (CENTRUM SILVER PO) Take 1 tablet by mouth daily.     • pantoprazole (PROTONIX) 40 MG EC tablet Take 1 tablet by mouth Daily. 90 tablet 1   • rosuvastatin (CRESTOR) 10 MG tablet Take 1 tablet by mouth Every Night. 90 tablet 1   • Vitamin E 400 UNITS tablet Take 1 tablet by mouth daily.     • [DISCONTINUED] lisinopril (PRINIVIL,ZESTRIL) 10 MG tablet Take 1 tablet by mouth Daily. 90 tablet 1   • [DISCONTINUED] metFORMIN ER (GLUCOPHAGE-XR) 500 MG 24 hr tablet Take 2 tablets by mouth Every Evening. 180 tablet 1   • [DISCONTINUED] pantoprazole (PROTONIX) 40 MG EC tablet Take 1 tablet by mouth Daily. 90 tablet 1   • [DISCONTINUED] rosuvastatin (CRESTOR) 10 MG tablet TAKE 1 TABLET BY MOUTH EVERY NIGHT. 90 tablet 1     No facility-administered encounter medications on file as of 10/12/2020.        Reviewed use of high risk medication in the elderly: yes  Reviewed for potential of harmful drug interactions in the elderly: yes    Follow Up:  Return in about 4 months (around 2/12/2021) for Next scheduled follow up, Or sooner as needed With Labs prior to appointment.     An After Visit Summary and PPPS with all of these plans were given to the patient.

## 2020-10-12 NOTE — PROGRESS NOTES
Chief Complaint   Patient presents with   • Medicare Wellness-subsequent     sub medicare wellness    • Hyperlipidemia     4 month f/u on labs    • Hypertension     Subjective   Ronald Irizarry is a 70 y.o. male who presents to the office for follow-up and review of labs.  He has diabetes, and his blood sugar has been doing well.  He is compliant with his diabetic medication and diet.  He monitors his blood sugar 1 time daily.  He has hyperlipidemia, and takes Crestor daily.  He has hypertension and his blood pressure has been controlled.  He has osteoarthritis and takes over-the-counter NSAIDs as needed.  He takes Protonix daily for treatment of GERD.    He had a low-grade temp of 100.8 along with abdominal discomfort and diarrhea a couple of weeks ago.  This only lasted for 1 or 2 days, and then resolved.  He took Imodium over-the-counter to help with symptoms.  He has not experienced any other symptoms since that time.    The following portions of the patient's history were reviewed and updated as appropriate: allergies, current medications, past family history, past medical history, past social history, past surgical history and problem list.    Review of Systems   Constitutional: Negative for chills, fatigue and fever.   HENT: Negative for congestion, sneezing, sore throat and trouble swallowing.    Eyes: Negative for visual disturbance.   Respiratory: Negative for cough, chest tightness, shortness of breath and wheezing.    Cardiovascular: Negative for chest pain, palpitations and leg swelling.   Gastrointestinal: Negative for abdominal pain, constipation, diarrhea, nausea and vomiting.   Genitourinary: Negative for dysuria, frequency and urgency.   Musculoskeletal: Negative for neck pain.   Skin: Negative for rash.   Neurological: Negative for dizziness, weakness and headaches.   Psychiatric/Behavioral:        Patient denies any feelings of depression and has not felt down, hopeless or lost interest in any  "activities.   All other systems reviewed and are negative.      Objective   Vitals:    10/12/20 1308   BP: 120/74   Pulse: 65   Temp: 98.6 °F (37 °C)   TempSrc: Oral   SpO2: 99%   Weight: 69.9 kg (154 lb)   Height: 177.8 cm (70\")   PainSc: 0-No pain      Body mass index is 22.1 kg/m².    Physical Exam   Constitutional: He is oriented to person, place, and time. He appears well-developed. No distress.   HENT:   Head: Normocephalic and atraumatic.   Nose: Nose normal.   Mouth/Throat: No oropharyngeal exudate.   Eyes: Pupils are equal, round, and reactive to light. Conjunctivae are normal. No scleral icterus.   Neck: Normal range of motion. Neck supple.   Cardiovascular: Normal rate, regular rhythm and normal heart sounds. Exam reveals no gallop and no friction rub.   No murmur heard.  Pulmonary/Chest: Effort normal and breath sounds normal. No respiratory distress. He has no wheezes. He has no rales.   Abdominal: Soft. Bowel sounds are normal. He exhibits no distension. There is no abdominal tenderness. There is no rebound and no guarding.   Musculoskeletal: Normal range of motion.   Lymphadenopathy:     He has no cervical adenopathy.   Neurological: He is alert and oriented to person, place, and time. No cranial nerve deficit.   Skin: Skin is warm and dry. No rash noted.   Psychiatric: His behavior is normal. Judgment and thought content normal.   Nursing note and vitals reviewed.      Assessment/Plan   Ronald was seen today for medicare wellness-subsequent, hyperlipidemia and hypertension.    Diagnoses and all orders for this visit:    Medicare annual wellness visit, subsequent    Type 2 diabetes mellitus without complication, without long-term current use of insulin (CMS/[facility])  -     Hemoglobin A1c; Future  -     metFORMIN ER (GLUCOPHAGE-XR) 500 MG 24 hr tablet; Take 2 tablets by mouth Every Evening.    Essential hypertension  -     CBC & Differential; Future  -     Comprehensive Metabolic Panel; Future  -     " lisinopril (PRINIVIL,ZESTRIL) 10 MG tablet; Take 1 tablet by mouth Daily.    Mixed hyperlipidemia  -     Lipid Panel; Future  -     rosuvastatin (CRESTOR) 10 MG tablet; Take 1 tablet by mouth Every Night.    Gastroesophageal reflux disease without esophagitis  -     pantoprazole (PROTONIX) 40 MG EC tablet; Take 1 tablet by mouth Daily.    Primary osteoarthritis involving multiple joints    Screening for prostate cancer  -     PSA Screen; Future    Influenza vaccination administered at current visit  -     Fluad Quad >65 years         Labs are reviewed with patient.  His glucose is 131, and his hemoglobin A1c is 7.24.  His diabetes is controlled.  He will continue with his current diabetic medications.  He may monitor blood sugar 1 time daily.  His LDL is at goal at 79.  He will continue with Crestor 10 mg daily.  His blood pressure is controlled, and he will continue with his current blood pressure medication.  He will continue with Protonix for treatment of GERD.  He will continue to use an over-the-counter NSAID as needed for treatment of his osteoarthritis.      His low-grade fever and GI symptoms were most likely related to a viral stomach bug.  This is now resolved, so no further work-up or treatment is needed.  He will let me know if he has any further symptoms.    Patient is given a flu shot today.    PHQ-2/PHQ-9 Depression Screening 10/12/2020   Little interest or pleasure in doing things 0   Feeling down, depressed, or hopeless 0   Trouble falling or staying asleep, or sleeping too much -   Feeling tired or having little energy -   Poor appetite or overeating -   Feeling bad about yourself - or that you are a failure or have let yourself or your family down -   Trouble concentrating on things, such as reading the newspaper or watching television -   Moving or speaking so slowly that other people could have noticed. Or the opposite - being so fidgety or restless that you have been moving around a lot more  than usual -   Thoughts that you would be better off dead, or of hurting yourself in some way -   Total Score 0         Lab on 10/05/2020   Component Date Value Ref Range Status   • Glucose 10/05/2020 131* 70 - 99 mg/dL Final   • BUN 10/05/2020 16  7 - 23 mg/dL Final   • Creatinine 10/05/2020 0.85  0.70 - 1.30 mg/dL Final   • Sodium 10/05/2020 138  137 - 145 mmol/L Final   • Potassium 10/05/2020 4.4  3.4 - 5.0 mmol/L Final   • Chloride 10/05/2020 101  101 - 112 mmol/L Final   • CO2 10/05/2020 31.0* 22.0 - 30.0 mmol/L Final   • Calcium 10/05/2020 9.0  8.4 - 10.2 mg/dL Final   • Total Protein 10/05/2020 7.3  6.3 - 8.6 g/dL Final   • Albumin 10/05/2020 4.20  3.50 - 5.00 g/dL Final   • ALT (SGPT) 10/05/2020 22  <=50 U/L Final   • AST (SGOT) 10/05/2020 23  17 - 59 U/L Final   • Alkaline Phosphatase 10/05/2020 74  38 - 126 U/L Final   • Total Bilirubin 10/05/2020 0.5  0.2 - 1.3 mg/dL Final   • eGFR Non African Amer 10/05/2020 89  42 - 98 mL/min/1.73 Final   • Globulin 10/05/2020 3.1  2.3 - 3.5 gm/dL Final   • A/G Ratio 10/05/2020 1.4  1.1 - 1.8 g/dL Final   • BUN/Creatinine Ratio 10/05/2020 18.8  7.0 - 25.0 Final   • Anion Gap 10/05/2020 6.0  5.0 - 15.0 mmol/L Final   • Hemoglobin A1C 10/05/2020 7.24* 4.80 - 5.60 % Final   • LDL Cholesterol  10/05/2020 79  0 - 100 mg/dL Final   • Free T4 10/05/2020 1.49  0.93 - 1.70 ng/dL Final   • TSH 10/05/2020 0.987  0.270 - 4.200 uIU/mL Final   • Color, UA 10/05/2020 Yellow  Yellow, Straw Final   • Appearance, UA 10/05/2020 Slightly Cloudy* Clear Final   • pH, UA 10/05/2020 7.0  5.5 - 8.0 Final   • Specific Gravity, UA 10/05/2020 1.015  1.005 - 1.030 Final   • Glucose, UA 10/05/2020 Negative  Negative Final   • Ketones, UA 10/05/2020 Negative  Negative Final   • Bilirubin, UA 10/05/2020 Negative  Negative Final   • Blood, UA 10/05/2020 Negative  Negative Final   • Protein, UA 10/05/2020 Negative  Negative Final   • Leuk Esterase, UA 10/05/2020 Negative  Negative Final   • Nitrite, UA  10/05/2020 Negative  Negative Final   • Urobilinogen, UA 10/05/2020 0.2 E.U./dL  0.2 - 1.0 E.U./dL Final   • WBC 10/05/2020 7.01  3.40 - 10.80 10*3/mm3 Final   • RBC 10/05/2020 4.92  4.14 - 5.80 10*6/mm3 Final   • Hemoglobin 10/05/2020 14.4  13.0 - 17.7 g/dL Final   • Hematocrit 10/05/2020 44.9  37.5 - 51.0 % Final   • MCV 10/05/2020 91.3  79.0 - 97.0 fL Final   • MCH 10/05/2020 29.3  26.6 - 33.0 pg Final   • MCHC 10/05/2020 32.1  31.5 - 35.7 g/dL Final   • RDW 10/05/2020 12.7  12.3 - 15.4 % Final   • RDW-SD 10/05/2020 41.1  37.0 - 54.0 fl Final   • MPV 10/05/2020 8.3  6.0 - 12.0 fL Final   • Platelets 10/05/2020 259  140 - 450 10*3/mm3 Final   • Neutrophil % 10/05/2020 52.3  42.7 - 76.0 % Final   • Lymphocyte % 10/05/2020 34.0  19.6 - 45.3 % Final   • Monocyte % 10/05/2020 7.8  5.0 - 12.0 % Final   • Eosinophil % 10/05/2020 5.6  0.3 - 6.2 % Final   • Basophil % 10/05/2020 0.3  0.0 - 1.5 % Final   • Neutrophils, Absolute 10/05/2020 3.67  1.70 - 7.00 10*3/mm3 Final   • Lymphocytes, Absolute 10/05/2020 2.38  0.70 - 3.10 10*3/mm3 Final   • Monocytes, Absolute 10/05/2020 0.55  0.10 - 0.90 10*3/mm3 Final   • Eosinophils, Absolute 10/05/2020 0.39  0.00 - 0.40 10*3/mm3 Final   • Basophils, Absolute 10/05/2020 0.02  0.00 - 0.20 10*3/mm3 Final   ]        .  .  .

## 2021-01-11 DIAGNOSIS — E11.9 TYPE 2 DIABETES MELLITUS WITHOUT COMPLICATION, WITHOUT LONG-TERM CURRENT USE OF INSULIN (HCC): Primary | Chronic | ICD-10-CM

## 2021-02-09 ENCOUNTER — LAB (OUTPATIENT)
Dept: LAB | Facility: OTHER | Age: 71
End: 2021-02-09

## 2021-02-09 DIAGNOSIS — E11.9 TYPE 2 DIABETES MELLITUS WITHOUT COMPLICATION, WITHOUT LONG-TERM CURRENT USE OF INSULIN (HCC): Chronic | ICD-10-CM

## 2021-02-09 DIAGNOSIS — I10 ESSENTIAL HYPERTENSION: ICD-10-CM

## 2021-02-09 DIAGNOSIS — Z12.5 SCREENING FOR PROSTATE CANCER: ICD-10-CM

## 2021-02-09 DIAGNOSIS — E78.2 MIXED HYPERLIPIDEMIA: Chronic | ICD-10-CM

## 2021-02-09 LAB
ALBUMIN SERPL-MCNC: 4.2 G/DL (ref 3.5–5)
ALBUMIN/GLOB SERPL: 1.5 G/DL (ref 1.1–1.8)
ALP SERPL-CCNC: 51 U/L (ref 38–126)
ALT SERPL W P-5'-P-CCNC: 22 U/L
ANION GAP SERPL CALCULATED.3IONS-SCNC: 8 MMOL/L (ref 5–15)
AST SERPL-CCNC: 24 U/L (ref 17–59)
BASOPHILS # BLD AUTO: 0.02 10*3/MM3 (ref 0–0.2)
BASOPHILS NFR BLD AUTO: 0.3 % (ref 0–1.5)
BILIRUB SERPL-MCNC: 0.4 MG/DL (ref 0.2–1.3)
BUN SERPL-MCNC: 20 MG/DL (ref 7–23)
BUN/CREAT SERPL: 24.7 (ref 7–25)
CALCIUM SPEC-SCNC: 9.1 MG/DL (ref 8.4–10.2)
CHLORIDE SERPL-SCNC: 99 MMOL/L (ref 101–112)
CHOLEST SERPL-MCNC: 160 MG/DL (ref 150–200)
CO2 SERPL-SCNC: 29 MMOL/L (ref 22–30)
CREAT SERPL-MCNC: 0.81 MG/DL (ref 0.7–1.3)
DEPRECATED RDW RBC AUTO: 41.3 FL (ref 37–54)
EOSINOPHIL # BLD AUTO: 0.4 10*3/MM3 (ref 0–0.4)
EOSINOPHIL NFR BLD AUTO: 5.5 % (ref 0.3–6.2)
ERYTHROCYTE [DISTWIDTH] IN BLOOD BY AUTOMATED COUNT: 12.6 % (ref 12.3–15.4)
GFR SERPL CREATININE-BSD FRML MDRD: 94 ML/MIN/1.73 (ref 42–98)
GLOBULIN UR ELPH-MCNC: 2.8 GM/DL (ref 2.3–3.5)
GLUCOSE SERPL-MCNC: 145 MG/DL (ref 70–99)
HCT VFR BLD AUTO: 44.6 % (ref 37.5–51)
HDLC SERPL-MCNC: 37 MG/DL (ref 40–59)
HGB BLD-MCNC: 14.2 G/DL (ref 13–17.7)
LDLC SERPL CALC-MCNC: 102 MG/DL
LDLC/HDLC SERPL: 2.72 {RATIO} (ref 0–3.55)
LYMPHOCYTES # BLD AUTO: 3.38 10*3/MM3 (ref 0.7–3.1)
LYMPHOCYTES NFR BLD AUTO: 46.4 % (ref 19.6–45.3)
MCH RBC QN AUTO: 28.9 PG (ref 26.6–33)
MCHC RBC AUTO-ENTMCNC: 31.8 G/DL (ref 31.5–35.7)
MCV RBC AUTO: 90.7 FL (ref 79–97)
MONOCYTES # BLD AUTO: 0.58 10*3/MM3 (ref 0.1–0.9)
MONOCYTES NFR BLD AUTO: 8 % (ref 5–12)
NEUTROPHILS NFR BLD AUTO: 2.9 10*3/MM3 (ref 1.7–7)
NEUTROPHILS NFR BLD AUTO: 39.8 % (ref 42.7–76)
PLATELET # BLD AUTO: 223 10*3/MM3 (ref 140–450)
PMV BLD AUTO: 8.8 FL (ref 6–12)
POTASSIUM SERPL-SCNC: 4.3 MMOL/L (ref 3.4–5)
PROT SERPL-MCNC: 7 G/DL (ref 6.3–8.6)
RBC # BLD AUTO: 4.92 10*6/MM3 (ref 4.14–5.8)
SODIUM SERPL-SCNC: 136 MMOL/L (ref 137–145)
TRIGL SERPL-MCNC: 112 MG/DL
VLDLC SERPL-MCNC: 21 MG/DL (ref 5–40)
WBC # BLD AUTO: 7.28 10*3/MM3 (ref 3.4–10.8)

## 2021-02-09 PROCEDURE — G0103 PSA SCREENING: HCPCS | Performed by: INTERNAL MEDICINE

## 2021-02-09 PROCEDURE — 85025 COMPLETE CBC W/AUTO DIFF WBC: CPT | Performed by: INTERNAL MEDICINE

## 2021-02-09 PROCEDURE — 83036 HEMOGLOBIN GLYCOSYLATED A1C: CPT | Performed by: INTERNAL MEDICINE

## 2021-02-09 PROCEDURE — 36415 COLL VENOUS BLD VENIPUNCTURE: CPT | Performed by: INTERNAL MEDICINE

## 2021-02-09 PROCEDURE — 80061 LIPID PANEL: CPT | Performed by: INTERNAL MEDICINE

## 2021-02-09 PROCEDURE — 80053 COMPREHEN METABOLIC PANEL: CPT | Performed by: INTERNAL MEDICINE

## 2021-02-10 LAB
HBA1C MFR BLD: 7.5 % (ref 4.8–5.6)
PSA SERPL-MCNC: 1.5 NG/ML (ref 0–4)

## 2021-02-12 ENCOUNTER — TELEPHONE (OUTPATIENT)
Dept: FAMILY MEDICINE CLINIC | Facility: CLINIC | Age: 71
End: 2021-02-12

## 2021-02-12 RX ORDER — BLOOD SUGAR DIAGNOSTIC
STRIP MISCELLANEOUS
Qty: 100 EACH | Refills: 3 | Status: SHIPPED | OUTPATIENT
Start: 2021-02-12 | End: 2022-01-10

## 2021-02-22 ENCOUNTER — OFFICE VISIT (OUTPATIENT)
Dept: FAMILY MEDICINE CLINIC | Facility: CLINIC | Age: 71
End: 2021-02-22

## 2021-02-22 VITALS
SYSTOLIC BLOOD PRESSURE: 118 MMHG | HEIGHT: 70 IN | WEIGHT: 151 LBS | OXYGEN SATURATION: 99 % | DIASTOLIC BLOOD PRESSURE: 64 MMHG | HEART RATE: 72 BPM | TEMPERATURE: 98.7 F | BODY MASS INDEX: 21.62 KG/M2

## 2021-02-22 DIAGNOSIS — K21.9 GASTROESOPHAGEAL REFLUX DISEASE WITHOUT ESOPHAGITIS: Chronic | ICD-10-CM

## 2021-02-22 DIAGNOSIS — E78.2 MIXED HYPERLIPIDEMIA: Chronic | ICD-10-CM

## 2021-02-22 DIAGNOSIS — E11.9 TYPE 2 DIABETES MELLITUS WITHOUT COMPLICATION, WITHOUT LONG-TERM CURRENT USE OF INSULIN (HCC): Primary | Chronic | ICD-10-CM

## 2021-02-22 DIAGNOSIS — M15.9 PRIMARY OSTEOARTHRITIS INVOLVING MULTIPLE JOINTS: Chronic | ICD-10-CM

## 2021-02-22 DIAGNOSIS — I10 ESSENTIAL HYPERTENSION: Chronic | ICD-10-CM

## 2021-02-22 PROCEDURE — 99214 OFFICE O/P EST MOD 30 MIN: CPT | Performed by: INTERNAL MEDICINE

## 2021-02-22 RX ORDER — LISINOPRIL 10 MG/1
10 TABLET ORAL DAILY
Qty: 90 TABLET | Refills: 1 | Status: SHIPPED | OUTPATIENT
Start: 2021-02-22 | End: 2021-06-21 | Stop reason: SDUPTHER

## 2021-02-22 RX ORDER — PANTOPRAZOLE SODIUM 40 MG/1
40 TABLET, DELAYED RELEASE ORAL DAILY
Qty: 90 TABLET | Refills: 1 | Status: SHIPPED | OUTPATIENT
Start: 2021-02-22 | End: 2021-06-21 | Stop reason: SDUPTHER

## 2021-02-22 RX ORDER — ROSUVASTATIN CALCIUM 10 MG/1
10 TABLET, COATED ORAL NIGHTLY
Qty: 90 TABLET | Refills: 1 | Status: SHIPPED | OUTPATIENT
Start: 2021-02-22 | End: 2021-06-21 | Stop reason: SDUPTHER

## 2021-02-22 RX ORDER — METFORMIN HYDROCHLORIDE 500 MG/1
1000 TABLET, EXTENDED RELEASE ORAL EVERY EVENING
Qty: 180 TABLET | Refills: 1 | Status: SHIPPED | OUTPATIENT
Start: 2021-02-22 | End: 2021-06-21 | Stop reason: SDUPTHER

## 2021-02-22 NOTE — PROGRESS NOTES
Chief Complaint   Patient presents with   • Hypertension     4 month f/u labs   • Hyperlipidemia     Subjective   Ronald Irizarry is a 71 y.o. male who presents to the office for follow-up and review of labs.  He has diabetes, and his blood sugar has been running a little high compared to the previous visit.  He is compliant with his medication.  He has been less active, and not as compliant with a diabetic diet.  He continues to monitor blood sugar 1 time daily.  He has hyperlipidemia and takes Crestor daily.  He has hypertension and his blood pressure has been doing well.  He has GERD and takes Protonix daily.  He has osteoarthritis and takes an over-the-counter NSAID as needed.    The following portions of the patient's history were reviewed and updated as appropriate: allergies, current medications, past family history, past medical history, past social history, past surgical history and problem list.    Review of Systems   Constitutional: Negative for chills, fatigue and fever.   HENT: Negative for congestion, sneezing, sore throat and trouble swallowing.    Eyes: Negative for visual disturbance.   Respiratory: Negative for cough, chest tightness, shortness of breath and wheezing.    Cardiovascular: Negative for chest pain, palpitations and leg swelling.   Gastrointestinal: Negative for abdominal pain, constipation, diarrhea, nausea and vomiting.   Genitourinary: Negative for dysuria, frequency and urgency.   Musculoskeletal: Negative for neck pain.   Skin: Negative for rash.   Neurological: Negative for dizziness, weakness and headaches.   Psychiatric/Behavioral:        Patient denies any feelings of depression and has not felt down, hopeless or lost interest in any activities.   All other systems reviewed and are negative.  Review of systems has been reviewed and validated on 02/22/2021and updated with any changes.      Objective   Vitals:    02/22/21 1505   BP: 118/64   Pulse: 72   Temp: 98.7 °F (37.1 °C)  "  TempSrc: Oral   SpO2: 99%   Weight: 68.5 kg (151 lb)   Height: 177.8 cm (70\")   PainSc: 0-No pain   PainLoc: Generalized      Body mass index is 21.67 kg/m².    Physical Exam   Constitutional: He is oriented to person, place, and time. He appears well-developed. No distress.   HENT:   Head: Normocephalic and atraumatic.   Eyes: Pupils are equal, round, and reactive to light. Conjunctivae are normal. No scleral icterus.   Neck: Normal range of motion. Neck supple.   Cardiovascular: Normal rate, regular rhythm and normal heart sounds. Exam reveals no gallop and no friction rub.   No murmur heard.  Pulmonary/Chest: Effort normal and breath sounds normal. No respiratory distress. He has no wheezes. He has no rales.   Musculoskeletal: Normal range of motion.   Lymphadenopathy:     He has no cervical adenopathy.   Neurological: He is alert and oriented to person, place, and time. No cranial nerve deficit.   Skin: Skin is warm and dry. No rash noted.   Psychiatric: His behavior is normal. Judgment and thought content normal.   Nursing note and vitals reviewed.  Physical exam has been reviewed and validated on 02/22/2021and updated with any changes.      Assessment/Plan   Diagnoses and all orders for this visit:    1. Type 2 diabetes mellitus without complication, without long-term current use of insulin (CMS/[facility]) (Primary)  -     Hemoglobin A1c; Future  -     Microalbumin / Creatinine Urine Ratio - Urine, Clean Catch; Future  -     metFORMIN ER (GLUCOPHAGE-XR) 500 MG 24 hr tablet; Take 2 tablets by mouth Every Evening.  Dispense: 180 tablet; Refill: 1    2. Essential hypertension  -     CBC & Differential; Future  -     Comprehensive Metabolic Panel; Future  -     T4, Free; Future  -     TSH; Future  -     Urinalysis With Culture If Indicated -; Future  -     lisinopril (PRINIVIL,ZESTRIL) 10 MG tablet; Take 1 tablet by mouth Daily.  Dispense: 90 tablet; Refill: 1    3. Mixed hyperlipidemia  -     LDL Cholesterol, Direct; " Future  -     rosuvastatin (CRESTOR) 10 MG tablet; Take 1 tablet by mouth Every Night.  Dispense: 90 tablet; Refill: 1    4. Gastroesophageal reflux disease without esophagitis  -     pantoprazole (PROTONIX) 40 MG EC tablet; Take 1 tablet by mouth Daily.  Dispense: 90 tablet; Refill: 1    5. Primary osteoarthritis involving multiple joints         Labs are reviewed with patient.  His glucose is 145, and his hemoglobin A1c is 7.50.  This is up from 7.2 at the previous visit.  He will continue with his current diabetic medications.  He may monitor blood sugar 1 time daily.  He will try to be more compliant with a diabetic diet.  He will also try to increase his activity level.  His total cholesterol is 160,  and triglycerides 112.  He will continue with Crestor 10 mg daily for treatment of hyperlipidemia.  His blood pressure is controlled, and he will continue with his current blood pressure medication.  He will continue with Protonix for treatment of GERD.  He will continue to use an over-the-counter NSAID as needed for his osteoarthritis.  His PSA is normal at 1.50.    ACP Discussion Status: ACP discussion was held with the patient during this visit. Patient does not have an advance directive, declines further assistance.      PHQ-2/PHQ-9 Depression Screening 2/22/2021   Little interest or pleasure in doing things 0   Feeling down, depressed, or hopeless 0   Trouble falling or staying asleep, or sleeping too much -   Feeling tired or having little energy -   Poor appetite or overeating -   Feeling bad about yourself - or that you are a failure or have let yourself or your family down -   Trouble concentrating on things, such as reading the newspaper or watching television -   Moving or speaking so slowly that other people could have noticed. Or the opposite - being so fidgety or restless that you have been moving around a lot more than usual -   Thoughts that you would be better off dead, or of hurting  yourself in some way -   Total Score 0         Lab on 02/09/2021   Component Date Value Ref Range Status   • Glucose 02/09/2021 145* 70 - 99 mg/dL Final   • BUN 02/09/2021 20  7 - 23 mg/dL Final   • Creatinine 02/09/2021 0.81  0.70 - 1.30 mg/dL Final   • Sodium 02/09/2021 136* 137 - 145 mmol/L Final   • Potassium 02/09/2021 4.3  3.4 - 5.0 mmol/L Final   • Chloride 02/09/2021 99* 101 - 112 mmol/L Final   • CO2 02/09/2021 29.0  22.0 - 30.0 mmol/L Final   • Calcium 02/09/2021 9.1  8.4 - 10.2 mg/dL Final   • Total Protein 02/09/2021 7.0  6.3 - 8.6 g/dL Final   • Albumin 02/09/2021 4.20  3.50 - 5.00 g/dL Final   • ALT (SGPT) 02/09/2021 22  <=50 U/L Final   • AST (SGOT) 02/09/2021 24  17 - 59 U/L Final   • Alkaline Phosphatase 02/09/2021 51  38 - 126 U/L Final   • Total Bilirubin 02/09/2021 0.4  0.2 - 1.3 mg/dL Final   • eGFR Non African Amer 02/09/2021 94  42 - 98 mL/min/1.73 Final   • Globulin 02/09/2021 2.8  2.3 - 3.5 gm/dL Final   • A/G Ratio 02/09/2021 1.5  1.1 - 1.8 g/dL Final   • BUN/Creatinine Ratio 02/09/2021 24.7  7.0 - 25.0 Final   • Anion Gap 02/09/2021 8.0  5.0 - 15.0 mmol/L Final   • Hemoglobin A1C 02/09/2021 7.50* 4.80 - 5.60 % Final   • Total Cholesterol 02/09/2021 160  150 - 200 mg/dL Final   • Triglycerides 02/09/2021 112  <=150 mg/dL Final   • HDL Cholesterol 02/09/2021 37* 40 - 59 mg/dL Final   • LDL Cholesterol  02/09/2021 102* <=100 mg/dL Final   • VLDL Cholesterol 02/09/2021 21  5 - 40 mg/dL Final   • LDL/HDL Ratio 02/09/2021 2.72  0.00 - 3.55 Final   • PSA 02/09/2021 1.500  0.000 - 4.000 ng/mL Final   • WBC 02/09/2021 7.28  3.40 - 10.80 10*3/mm3 Final   • RBC 02/09/2021 4.92  4.14 - 5.80 10*6/mm3 Final   • Hemoglobin 02/09/2021 14.2  13.0 - 17.7 g/dL Final   • Hematocrit 02/09/2021 44.6  37.5 - 51.0 % Final   • MCV 02/09/2021 90.7  79.0 - 97.0 fL Final   • MCH 02/09/2021 28.9  26.6 - 33.0 pg Final   • MCHC 02/09/2021 31.8  31.5 - 35.7 g/dL Final   • RDW 02/09/2021 12.6  12.3 - 15.4 % Final   •  RDW-SD 02/09/2021 41.3  37.0 - 54.0 fl Final   • MPV 02/09/2021 8.8  6.0 - 12.0 fL Final   • Platelets 02/09/2021 223  140 - 450 10*3/mm3 Final   • Neutrophil % 02/09/2021 39.8* 42.7 - 76.0 % Final   • Lymphocyte % 02/09/2021 46.4* 19.6 - 45.3 % Final   • Monocyte % 02/09/2021 8.0  5.0 - 12.0 % Final   • Eosinophil % 02/09/2021 5.5  0.3 - 6.2 % Final   • Basophil % 02/09/2021 0.3  0.0 - 1.5 % Final   • Neutrophils, Absolute 02/09/2021 2.90  1.70 - 7.00 10*3/mm3 Final   • Lymphocytes, Absolute 02/09/2021 3.38* 0.70 - 3.10 10*3/mm3 Final   • Monocytes, Absolute 02/09/2021 0.58  0.10 - 0.90 10*3/mm3 Final   • Eosinophils, Absolute 02/09/2021 0.40  0.00 - 0.40 10*3/mm3 Final   • Basophils, Absolute 02/09/2021 0.02  0.00 - 0.20 10*3/mm3 Final   ]        .  .  .

## 2021-06-16 ENCOUNTER — LAB (OUTPATIENT)
Dept: LAB | Facility: OTHER | Age: 71
End: 2021-06-16

## 2021-06-16 DIAGNOSIS — E11.9 TYPE 2 DIABETES MELLITUS WITHOUT COMPLICATION, WITHOUT LONG-TERM CURRENT USE OF INSULIN (HCC): Chronic | ICD-10-CM

## 2021-06-16 DIAGNOSIS — I10 ESSENTIAL HYPERTENSION: ICD-10-CM

## 2021-06-16 DIAGNOSIS — E78.2 MIXED HYPERLIPIDEMIA: Chronic | ICD-10-CM

## 2021-06-16 LAB
ALBUMIN SERPL-MCNC: 4.3 G/DL (ref 3.5–5)
ALBUMIN/GLOB SERPL: 1.5 G/DL (ref 1.1–1.8)
ALP SERPL-CCNC: 61 U/L (ref 38–126)
ALT SERPL W P-5'-P-CCNC: 15 U/L
ANION GAP SERPL CALCULATED.3IONS-SCNC: 6 MMOL/L (ref 5–15)
AST SERPL-CCNC: 24 U/L (ref 17–59)
BASOPHILS # BLD AUTO: 0.02 10*3/MM3 (ref 0–0.2)
BASOPHILS NFR BLD AUTO: 0.3 % (ref 0–1.5)
BILIRUB SERPL-MCNC: 0.7 MG/DL (ref 0.2–1.3)
BILIRUB UR QL STRIP: NEGATIVE
BUN SERPL-MCNC: 17 MG/DL (ref 7–23)
BUN/CREAT SERPL: 20.7 (ref 7–25)
CALCIUM SPEC-SCNC: 9.4 MG/DL (ref 8.4–10.2)
CHLORIDE SERPL-SCNC: 102 MMOL/L (ref 101–112)
CLARITY UR: CLEAR
CO2 SERPL-SCNC: 29 MMOL/L (ref 22–30)
COLOR UR: YELLOW
CREAT SERPL-MCNC: 0.82 MG/DL (ref 0.7–1.3)
DEPRECATED RDW RBC AUTO: 41 FL (ref 37–54)
EOSINOPHIL # BLD AUTO: 0.4 10*3/MM3 (ref 0–0.4)
EOSINOPHIL NFR BLD AUTO: 6 % (ref 0.3–6.2)
ERYTHROCYTE [DISTWIDTH] IN BLOOD BY AUTOMATED COUNT: 12.6 % (ref 12.3–15.4)
GFR SERPL CREATININE-BSD FRML MDRD: 92 ML/MIN/1.73 (ref 42–98)
GLOBULIN UR ELPH-MCNC: 2.9 GM/DL (ref 2.3–3.5)
GLUCOSE SERPL-MCNC: 127 MG/DL (ref 70–99)
GLUCOSE UR STRIP-MCNC: NEGATIVE MG/DL
HCT VFR BLD AUTO: 43.7 % (ref 37.5–51)
HGB BLD-MCNC: 14.5 G/DL (ref 13–17.7)
HGB UR QL STRIP.AUTO: NEGATIVE
KETONES UR QL STRIP: NEGATIVE
LEUKOCYTE ESTERASE UR QL STRIP.AUTO: NEGATIVE
LYMPHOCYTES # BLD AUTO: 2.8 10*3/MM3 (ref 0.7–3.1)
LYMPHOCYTES NFR BLD AUTO: 41.7 % (ref 19.6–45.3)
MCH RBC QN AUTO: 30.1 PG (ref 26.6–33)
MCHC RBC AUTO-ENTMCNC: 33.2 G/DL (ref 31.5–35.7)
MCV RBC AUTO: 90.9 FL (ref 79–97)
MONOCYTES # BLD AUTO: 0.52 10*3/MM3 (ref 0.1–0.9)
MONOCYTES NFR BLD AUTO: 7.7 % (ref 5–12)
NEUTROPHILS NFR BLD AUTO: 2.98 10*3/MM3 (ref 1.7–7)
NEUTROPHILS NFR BLD AUTO: 44.3 % (ref 42.7–76)
NITRITE UR QL STRIP: NEGATIVE
PH UR STRIP.AUTO: 7 [PH] (ref 5.5–8)
PLATELET # BLD AUTO: 220 10*3/MM3 (ref 140–450)
PMV BLD AUTO: 8.7 FL (ref 6–12)
POTASSIUM SERPL-SCNC: 4.1 MMOL/L (ref 3.4–5)
PROT SERPL-MCNC: 7.2 G/DL (ref 6.3–8.6)
PROT UR QL STRIP: NEGATIVE
RBC # BLD AUTO: 4.81 10*6/MM3 (ref 4.14–5.8)
SODIUM SERPL-SCNC: 137 MMOL/L (ref 137–145)
SP GR UR STRIP: 1.02 (ref 1–1.03)
UROBILINOGEN UR QL STRIP: NORMAL
WBC # BLD AUTO: 6.72 10*3/MM3 (ref 3.4–10.8)

## 2021-06-16 PROCEDURE — 82570 ASSAY OF URINE CREATININE: CPT | Performed by: INTERNAL MEDICINE

## 2021-06-16 PROCEDURE — 36415 COLL VENOUS BLD VENIPUNCTURE: CPT | Performed by: INTERNAL MEDICINE

## 2021-06-16 PROCEDURE — 85025 COMPLETE CBC W/AUTO DIFF WBC: CPT | Performed by: INTERNAL MEDICINE

## 2021-06-16 PROCEDURE — 81003 URINALYSIS AUTO W/O SCOPE: CPT | Performed by: INTERNAL MEDICINE

## 2021-06-16 PROCEDURE — 82043 UR ALBUMIN QUANTITATIVE: CPT | Performed by: INTERNAL MEDICINE

## 2021-06-16 PROCEDURE — 84439 ASSAY OF FREE THYROXINE: CPT | Performed by: INTERNAL MEDICINE

## 2021-06-16 PROCEDURE — 84443 ASSAY THYROID STIM HORMONE: CPT | Performed by: INTERNAL MEDICINE

## 2021-06-16 PROCEDURE — 83721 ASSAY OF BLOOD LIPOPROTEIN: CPT | Performed by: INTERNAL MEDICINE

## 2021-06-16 PROCEDURE — 83036 HEMOGLOBIN GLYCOSYLATED A1C: CPT | Performed by: INTERNAL MEDICINE

## 2021-06-16 PROCEDURE — 80053 COMPREHEN METABOLIC PANEL: CPT | Performed by: INTERNAL MEDICINE

## 2021-06-17 LAB
ALBUMIN UR-MCNC: <1.2 MG/DL
ARTICHOKE IGE QN: 85 MG/DL (ref 0–100)
CREAT UR-MCNC: 62.2 MG/DL
HBA1C MFR BLD: 7.41 % (ref 4.8–5.6)
MICROALBUMIN/CREAT UR: NORMAL MG/G{CREAT}
T4 FREE SERPL-MCNC: 1.5 NG/DL (ref 0.93–1.7)
TSH SERPL DL<=0.05 MIU/L-ACNC: 1.1 UIU/ML (ref 0.27–4.2)

## 2021-06-21 ENCOUNTER — OFFICE VISIT (OUTPATIENT)
Dept: FAMILY MEDICINE CLINIC | Facility: CLINIC | Age: 71
End: 2021-06-21

## 2021-06-21 VITALS
HEIGHT: 70 IN | DIASTOLIC BLOOD PRESSURE: 66 MMHG | WEIGHT: 153 LBS | BODY MASS INDEX: 21.9 KG/M2 | HEART RATE: 83 BPM | SYSTOLIC BLOOD PRESSURE: 118 MMHG

## 2021-06-21 DIAGNOSIS — E11.9 TYPE 2 DIABETES MELLITUS WITHOUT COMPLICATION, WITHOUT LONG-TERM CURRENT USE OF INSULIN (HCC): Primary | Chronic | ICD-10-CM

## 2021-06-21 DIAGNOSIS — E78.2 MIXED HYPERLIPIDEMIA: Chronic | ICD-10-CM

## 2021-06-21 DIAGNOSIS — K21.9 GASTROESOPHAGEAL REFLUX DISEASE WITHOUT ESOPHAGITIS: Chronic | ICD-10-CM

## 2021-06-21 DIAGNOSIS — L98.9 SKIN LESION OF LEFT ARM: ICD-10-CM

## 2021-06-21 DIAGNOSIS — I10 ESSENTIAL HYPERTENSION: Chronic | ICD-10-CM

## 2021-06-21 DIAGNOSIS — M15.9 PRIMARY OSTEOARTHRITIS INVOLVING MULTIPLE JOINTS: Chronic | ICD-10-CM

## 2021-06-21 PROCEDURE — 99214 OFFICE O/P EST MOD 30 MIN: CPT | Performed by: INTERNAL MEDICINE

## 2021-06-21 RX ORDER — ROSUVASTATIN CALCIUM 10 MG/1
10 TABLET, COATED ORAL NIGHTLY
Qty: 90 TABLET | Refills: 1 | Status: SHIPPED | OUTPATIENT
Start: 2021-06-21 | End: 2021-10-22 | Stop reason: SDUPTHER

## 2021-06-21 RX ORDER — PANTOPRAZOLE SODIUM 40 MG/1
40 TABLET, DELAYED RELEASE ORAL DAILY
Qty: 90 TABLET | Refills: 1 | Status: SHIPPED | OUTPATIENT
Start: 2021-06-21 | End: 2021-10-22 | Stop reason: SDUPTHER

## 2021-06-21 RX ORDER — LISINOPRIL 10 MG/1
10 TABLET ORAL DAILY
Qty: 90 TABLET | Refills: 1 | Status: SHIPPED | OUTPATIENT
Start: 2021-06-21 | End: 2021-10-22 | Stop reason: SDUPTHER

## 2021-06-21 RX ORDER — METFORMIN HYDROCHLORIDE 500 MG/1
1000 TABLET, EXTENDED RELEASE ORAL EVERY EVENING
Qty: 180 TABLET | Refills: 1 | Status: SHIPPED | OUTPATIENT
Start: 2021-06-21 | End: 2021-10-22 | Stop reason: SDUPTHER

## 2021-06-21 NOTE — PROGRESS NOTES
Chief Complaint   Patient presents with   • Diabetes     4 mo f/u w labs   • Hypertension     Subjective   Ronald Irizarry is a 71 y.o. male who presents to the office for follow-up and review of labs.  He has diabetes, and his blood sugar has been controlled.  He is compliant with his medication. He continues to monitor blood sugar 1 time daily.  He has hyperlipidemia and takes Crestor 10 mg daily.  He has hypertension and his blood pressure has been doing well.  He has GERD and takes Protonix 40 mg daily.  He has osteoarthritis and takes an over-the-counter NSAID as needed.  He has received the [employer] COVID-19 vaccine.    He has a skin lesion on his left arm which he has recently noticed.  It has not changed in size.  He does not recall a change in color.    History of Present Illness has been reviewed and validated on 06/21/2021 and updated with any changes.    The following portions of the patient's history were reviewed and updated as appropriate: allergies, current medications, past family history, past medical history, past social history, past surgical history and problem list.    Review of Systems   Constitutional: Negative for chills, fatigue and fever.   HENT: Negative for congestion, sneezing, sore throat and trouble swallowing.    Eyes: Negative for visual disturbance.   Respiratory: Negative for cough, chest tightness, shortness of breath and wheezing.    Cardiovascular: Negative for chest pain, palpitations and leg swelling.   Gastrointestinal: Negative for abdominal pain, constipation, diarrhea, nausea and vomiting.   Genitourinary: Negative for dysuria, frequency and urgency.   Musculoskeletal: Negative for neck pain.   Skin: Negative for rash.   Neurological: Negative for dizziness, weakness and headaches.   Psychiatric/Behavioral:        Patient denies any feelings of depression and has not felt down, hopeless or lost interest in any activities.   All other systems reviewed and are  "negative.  Review of systems has been reviewed and validated on 06/21/2021 and updated with any changes.      Objective   Vitals:    06/21/21 1256   BP: 118/66   BP Location: Left arm   Patient Position: Sitting   Cuff Size: Adult   Pulse: 83   Weight: 69.4 kg (153 lb)   Height: 177.8 cm (70\")   PainSc: 0-No pain      Body mass index is 21.95 kg/m².    Physical Exam   Constitutional: He is oriented to person, place, and time. He appears well-developed. No distress.   HENT:   Head: Normocephalic and atraumatic.   Eyes: Pupils are equal, round, and reactive to light. Conjunctivae are normal. No scleral icterus.   Cardiovascular: Normal rate, regular rhythm and normal heart sounds. Exam reveals no gallop and no friction rub.   No murmur heard.  Pulmonary/Chest: Effort normal and breath sounds normal. No respiratory distress. He has no wheezes. He has no rales.   Musculoskeletal: Normal range of motion.   Lymphadenopathy:     He has no cervical adenopathy.   Neurological: He is alert and oriented to person, place, and time. No cranial nerve deficit.   Skin: Skin is warm and dry. No rash noted.   He has a pinpoint dark appearing lesion on the left arm.   Psychiatric: His behavior is normal. Judgment and thought content normal.   Nursing note and vitals reviewed.  Physical exam has been reviewed and validated on 06/21/2021 and updated with any changes.      Assessment/Plan   Diagnoses and all orders for this visit:    1. Type 2 diabetes mellitus without complication, without long-term current use of insulin (CMS/[facility]) (Primary)  -     Hemoglobin A1c; Future  -     metFORMIN ER (GLUCOPHAGE-XR) 500 MG 24 hr tablet; Take 2 tablets by mouth Every Evening.  Dispense: 180 tablet; Refill: 1    2. Essential hypertension  -     CBC & Differential; Future  -     Comprehensive Metabolic Panel; Future  -     Urinalysis With Culture If Indicated -; Future  -     lisinopril (PRINIVIL,ZESTRIL) 10 MG tablet; Take 1 tablet by mouth Daily.  " Dispense: 90 tablet; Refill: 1    3. Mixed hyperlipidemia  -     LDL Cholesterol, Direct; Future  -     rosuvastatin (CRESTOR) 10 MG tablet; Take 1 tablet by mouth Every Night.  Dispense: 90 tablet; Refill: 1    4. Gastroesophageal reflux disease without esophagitis  -     pantoprazole (PROTONIX) 40 MG EC tablet; Take 1 tablet by mouth Daily.  Dispense: 90 tablet; Refill: 1    5. Primary osteoarthritis involving multiple joints    6. Skin lesion of left arm  -     Ambulatory Referral to Dermatology         Labs are reviewed with patient.  His glucose is 127, and his hemoglobin A1c is 7.41.  This has improved from the previous level of 7.50 at his last visit.  He will continue with his current diabetic medications.  He may monitor blood sugar 1 time daily.  His LDL is 85.  This has improved from the previous visit.  He will continue with Crestor 10 mg daily for treatment of hyperlipidemia.  His blood pressure is controlled, and he will continue with his current blood pressure medication.  He will continue with Protonix 40 mg daily for treatment of GERD.  He will continue to use an over-the-counter NSAID as needed for his osteoarthritis.     I will refer him to dermatology for evaluation of the skin lesion on the left arm as well as general skin examination.      PHQ-2/PHQ-9 Depression Screening 6/21/2021   Little interest or pleasure in doing things 0   Feeling down, depressed, or hopeless 0   Trouble falling or staying asleep, or sleeping too much -   Feeling tired or having little energy -   Poor appetite or overeating -   Feeling bad about yourself - or that you are a failure or have let yourself or your family down -   Trouble concentrating on things, such as reading the newspaper or watching television -   Moving or speaking so slowly that other people could have noticed. Or the opposite - being so fidgety or restless that you have been moving around a lot more than usual -   Thoughts that you would be better  off dead, or of hurting yourself in some way -   Total Score 0         Lab on 06/16/2021   Component Date Value Ref Range Status   • Glucose 06/16/2021 127* 70 - 99 mg/dL Final   • BUN 06/16/2021 17  7 - 23 mg/dL Final   • Creatinine 06/16/2021 0.82  0.70 - 1.30 mg/dL Final   • Sodium 06/16/2021 137  137 - 145 mmol/L Final   • Potassium 06/16/2021 4.1  3.4 - 5.0 mmol/L Final   • Chloride 06/16/2021 102  101 - 112 mmol/L Final   • CO2 06/16/2021 29.0  22.0 - 30.0 mmol/L Final   • Calcium 06/16/2021 9.4  8.4 - 10.2 mg/dL Final   • Total Protein 06/16/2021 7.2  6.3 - 8.6 g/dL Final   • Albumin 06/16/2021 4.30  3.50 - 5.00 g/dL Final   • ALT (SGPT) 06/16/2021 15  <=50 U/L Final   • AST (SGOT) 06/16/2021 24  17 - 59 U/L Final   • Alkaline Phosphatase 06/16/2021 61  38 - 126 U/L Final   • Total Bilirubin 06/16/2021 0.7  0.2 - 1.3 mg/dL Final   • eGFR Non  Amer 06/16/2021 92  42 - 98 mL/min/1.73 Final   • Globulin 06/16/2021 2.9  2.3 - 3.5 gm/dL Final   • A/G Ratio 06/16/2021 1.5  1.1 - 1.8 g/dL Final   • BUN/Creatinine Ratio 06/16/2021 20.7  7.0 - 25.0 Final   • Anion Gap 06/16/2021 6.0  5.0 - 15.0 mmol/L Final   • Hemoglobin A1C 06/16/2021 7.41* 4.80 - 5.60 % Final   • LDL Cholesterol  06/16/2021 85  0 - 100 mg/dL Final   • Free T4 06/16/2021 1.50  0.93 - 1.70 ng/dL Final   • TSH 06/16/2021 1.100  0.270 - 4.200 uIU/mL Final   • Color, UA 06/16/2021 Yellow  Yellow, Straw Final   • Appearance, UA 06/16/2021 Clear  Clear Final   • pH, UA 06/16/2021 7.0  5.5 - 8.0 Final   • Specific Gravity, UA 06/16/2021 1.020  1.005 - 1.030 Final   • Glucose, UA 06/16/2021 Negative  Negative Final   • Ketones, UA 06/16/2021 Negative  Negative Final   • Bilirubin, UA 06/16/2021 Negative  Negative Final   • Blood, UA 06/16/2021 Negative  Negative Final   • Protein, UA 06/16/2021 Negative  Negative Final   • Leuk Esterase, UA 06/16/2021 Negative  Negative Final   • Nitrite, UA 06/16/2021 Negative  Negative Final   • Urobilinogen, UA  06/16/2021 0.2 E.U./dL  0.2 - 1.0 E.U./dL Final   • Microalbumin/Creatinine Ratio 06/16/2021    Final    Unable to calculate   • Creatinine, Urine 06/16/2021 62.2  mg/dL Final   • Microalbumin, Urine 06/16/2021 <1.2  mg/dL Final   • WBC 06/16/2021 6.72  3.40 - 10.80 10*3/mm3 Final   • RBC 06/16/2021 4.81  4.14 - 5.80 10*6/mm3 Final   • Hemoglobin 06/16/2021 14.5  13.0 - 17.7 g/dL Final   • Hematocrit 06/16/2021 43.7  37.5 - 51.0 % Final   • MCV 06/16/2021 90.9  79.0 - 97.0 fL Final   • MCH 06/16/2021 30.1  26.6 - 33.0 pg Final   • MCHC 06/16/2021 33.2  31.5 - 35.7 g/dL Final   • RDW 06/16/2021 12.6  12.3 - 15.4 % Final   • RDW-SD 06/16/2021 41.0  37.0 - 54.0 fl Final   • MPV 06/16/2021 8.7  6.0 - 12.0 fL Final   • Platelets 06/16/2021 220  140 - 450 10*3/mm3 Final   • Neutrophil % 06/16/2021 44.3  42.7 - 76.0 % Final   • Lymphocyte % 06/16/2021 41.7  19.6 - 45.3 % Final   • Monocyte % 06/16/2021 7.7  5.0 - 12.0 % Final   • Eosinophil % 06/16/2021 6.0  0.3 - 6.2 % Final   • Basophil % 06/16/2021 0.3  0.0 - 1.5 % Final   • Neutrophils, Absolute 06/16/2021 2.98  1.70 - 7.00 10*3/mm3 Final   • Lymphocytes, Absolute 06/16/2021 2.80  0.70 - 3.10 10*3/mm3 Final   • Monocytes, Absolute 06/16/2021 0.52  0.10 - 0.90 10*3/mm3 Final   • Eosinophils, Absolute 06/16/2021 0.40  0.00 - 0.40 10*3/mm3 Final   • Basophils, Absolute 06/16/2021 0.02  0.00 - 0.20 10*3/mm3 Final   ]        .  .  .

## 2021-10-13 ENCOUNTER — LAB (OUTPATIENT)
Dept: LAB | Facility: OTHER | Age: 71
End: 2021-10-13

## 2021-10-13 DIAGNOSIS — I10 ESSENTIAL HYPERTENSION: ICD-10-CM

## 2021-10-13 DIAGNOSIS — E11.9 TYPE 2 DIABETES MELLITUS WITHOUT COMPLICATION, WITHOUT LONG-TERM CURRENT USE OF INSULIN (HCC): Chronic | ICD-10-CM

## 2021-10-13 DIAGNOSIS — E78.2 MIXED HYPERLIPIDEMIA: Chronic | ICD-10-CM

## 2021-10-13 LAB
ALBUMIN SERPL-MCNC: 4.5 G/DL (ref 3.5–5)
ALBUMIN/GLOB SERPL: 1.5 G/DL (ref 1.1–1.8)
ALP SERPL-CCNC: 57 U/L (ref 38–126)
ALT SERPL W P-5'-P-CCNC: 20 U/L
ANION GAP SERPL CALCULATED.3IONS-SCNC: 10 MMOL/L (ref 5–15)
AST SERPL-CCNC: 22 U/L (ref 17–59)
BASOPHILS # BLD AUTO: 0.03 10*3/MM3 (ref 0–0.2)
BASOPHILS NFR BLD AUTO: 0.4 % (ref 0–1.5)
BILIRUB SERPL-MCNC: 0.6 MG/DL (ref 0.2–1.3)
BILIRUB UR QL STRIP: NEGATIVE
BUN SERPL-MCNC: 17 MG/DL (ref 7–23)
BUN/CREAT SERPL: 21.3 (ref 7–25)
CALCIUM SPEC-SCNC: 9.4 MG/DL (ref 8.4–10.2)
CHLORIDE SERPL-SCNC: 99 MMOL/L (ref 101–112)
CLARITY UR: CLEAR
CO2 SERPL-SCNC: 29 MMOL/L (ref 22–30)
COLOR UR: YELLOW
CREAT SERPL-MCNC: 0.8 MG/DL (ref 0.7–1.3)
DEPRECATED RDW RBC AUTO: 41.1 FL (ref 37–54)
EOSINOPHIL # BLD AUTO: 0.35 10*3/MM3 (ref 0–0.4)
EOSINOPHIL NFR BLD AUTO: 5.2 % (ref 0.3–6.2)
ERYTHROCYTE [DISTWIDTH] IN BLOOD BY AUTOMATED COUNT: 12.6 % (ref 12.3–15.4)
GFR SERPL CREATININE-BSD FRML MDRD: 95 ML/MIN/1.73 (ref 42–98)
GLOBULIN UR ELPH-MCNC: 3.1 GM/DL (ref 2.3–3.5)
GLUCOSE SERPL-MCNC: 134 MG/DL (ref 70–99)
GLUCOSE UR STRIP-MCNC: NEGATIVE MG/DL
HCT VFR BLD AUTO: 46.5 % (ref 37.5–51)
HGB BLD-MCNC: 15.3 G/DL (ref 13–17.7)
HGB UR QL STRIP.AUTO: NEGATIVE
KETONES UR QL STRIP: NEGATIVE
LEUKOCYTE ESTERASE UR QL STRIP.AUTO: NEGATIVE
LYMPHOCYTES # BLD AUTO: 2.74 10*3/MM3 (ref 0.7–3.1)
LYMPHOCYTES NFR BLD AUTO: 40.8 % (ref 19.6–45.3)
MCH RBC QN AUTO: 29.9 PG (ref 26.6–33)
MCHC RBC AUTO-ENTMCNC: 32.9 G/DL (ref 31.5–35.7)
MCV RBC AUTO: 90.8 FL (ref 79–97)
MONOCYTES # BLD AUTO: 0.52 10*3/MM3 (ref 0.1–0.9)
MONOCYTES NFR BLD AUTO: 7.7 % (ref 5–12)
NEUTROPHILS NFR BLD AUTO: 3.07 10*3/MM3 (ref 1.7–7)
NEUTROPHILS NFR BLD AUTO: 45.9 % (ref 42.7–76)
NITRITE UR QL STRIP: NEGATIVE
PH UR STRIP.AUTO: 7 [PH] (ref 5.5–8)
PLATELET # BLD AUTO: 225 10*3/MM3 (ref 140–450)
PMV BLD AUTO: 8.8 FL (ref 6–12)
POTASSIUM SERPL-SCNC: 4.1 MMOL/L (ref 3.4–5)
PROT SERPL-MCNC: 7.6 G/DL (ref 6.3–8.6)
PROT UR QL STRIP: NEGATIVE
RBC # BLD AUTO: 5.12 10*6/MM3 (ref 4.14–5.8)
SODIUM SERPL-SCNC: 138 MMOL/L (ref 137–145)
SP GR UR STRIP: 1.02 (ref 1–1.03)
UROBILINOGEN UR QL STRIP: NORMAL
WBC # BLD AUTO: 6.71 10*3/MM3 (ref 3.4–10.8)

## 2021-10-13 PROCEDURE — 80053 COMPREHEN METABOLIC PANEL: CPT | Performed by: INTERNAL MEDICINE

## 2021-10-13 PROCEDURE — 85025 COMPLETE CBC W/AUTO DIFF WBC: CPT | Performed by: INTERNAL MEDICINE

## 2021-10-13 PROCEDURE — 36415 COLL VENOUS BLD VENIPUNCTURE: CPT | Performed by: INTERNAL MEDICINE

## 2021-10-13 PROCEDURE — 83721 ASSAY OF BLOOD LIPOPROTEIN: CPT | Performed by: INTERNAL MEDICINE

## 2021-10-13 PROCEDURE — 81003 URINALYSIS AUTO W/O SCOPE: CPT | Performed by: INTERNAL MEDICINE

## 2021-10-13 PROCEDURE — 83036 HEMOGLOBIN GLYCOSYLATED A1C: CPT | Performed by: INTERNAL MEDICINE

## 2021-10-14 LAB
ARTICHOKE IGE QN: 95 MG/DL (ref 0–100)
HBA1C MFR BLD: 7 % (ref 4.8–5.6)

## 2021-10-21 NOTE — PROGRESS NOTES
Chief Complaint   Patient presents with   • Medicare Wellness-subsequent   • Hypertension   • Hyperlipidemia   • Diabetes   • Heartburn   • Osteoarthritis     Subjective   Ronald Irizarry is a 71 y.o. male who presents to the office for follow-up and review of labs.  He has diabetes, and his blood sugar has been doing well.  He is compliant with his medication. He monitors blood sugar 1 time daily.  He has hyperlipidemia and takes Crestor 10 mg daily.  He has hypertension and his blood pressure has been controlled.  He has GERD and takes Protonix 40 mg daily.  He has osteoarthritis and takes an over-the-counter NSAID as needed.    History of Present Illness has been reviewed and validated on 10/22/2021 and updated with any changes.    The following portions of the patient's history were reviewed and updated as appropriate: allergies, current medications, past family history, past medical history, past social history, past surgical history and problem list.    Review of Systems   Constitutional: Negative for chills, fatigue and fever.   HENT: Negative for congestion, sneezing, sore throat and trouble swallowing.    Eyes: Negative for visual disturbance.   Respiratory: Negative for cough, chest tightness, shortness of breath and wheezing.    Cardiovascular: Negative for chest pain, palpitations and leg swelling.   Gastrointestinal: Negative for abdominal pain, constipation, diarrhea, nausea and vomiting.   Genitourinary: Negative for dysuria, frequency and urgency.   Musculoskeletal: Negative for neck pain.   Skin: Negative for rash.   Neurological: Negative for dizziness, weakness and headaches.   Psychiatric/Behavioral:        Patient denies any feelings of depression and has not felt down, hopeless or lost interest in any activities.   All other systems reviewed and are negative.  Review of systems has been reviewed and validated on 10/22/2021 and updated with any changes.      Objective   Vitals:    10/22/21  "1256   BP: 120/70   BP Location: Left arm   Patient Position: Sitting   Cuff Size: Adult   Pulse: 87   Temp: 97.1 °F (36.2 °C)   TempSrc: Tympanic   SpO2: 98%   Weight: 68.5 kg (151 lb)   Height: 177.8 cm (70\")   PainSc: 0-No pain      Body mass index is 21.67 kg/m².    Physical Exam   Constitutional: He is oriented to person, place, and time. He appears well-developed. No distress.   HENT:   Head: Normocephalic and atraumatic.   Eyes: Pupils are equal, round, and reactive to light. Conjunctivae are normal. No scleral icterus.   Cardiovascular: Normal rate, regular rhythm and normal heart sounds. Exam reveals no gallop and no friction rub.   No murmur heard.  Pulmonary/Chest: Effort normal and breath sounds normal. No respiratory distress. He has no wheezes. He has no rales.   Musculoskeletal: Normal range of motion.   Lymphadenopathy:     He has no cervical adenopathy.   Neurological: He is alert and oriented to person, place, and time. No cranial nerve deficit.   Skin: Skin is warm and dry. No rash noted.   He has a pinpoint dark appearing lesion on the left arm.   Psychiatric: His behavior is normal. Judgment and thought content normal.   Nursing note and vitals reviewed.  Physical exam has been reviewed and validated on 10/22/2021 and updated with any changes.      Assessment/Plan   Diagnoses and all orders for this visit:    1. Medicare annual wellness visit, subsequent (Primary)    2. Type 2 diabetes mellitus without complication, without long-term current use of insulin (HCC)  -     Hemoglobin A1c; Future  -     metFORMIN ER (GLUCOPHAGE-XR) 500 MG 24 hr tablet; Take 2 tablets by mouth Every Evening.  Dispense: 180 tablet; Refill: 1    3. Essential hypertension  -     CBC & Differential; Future  -     Comprehensive Metabolic Panel; Future  -     TSH; Future  -     T4, Free; Future  -     Urinalysis With Culture If Indicated -; Future  -     lisinopril (PRINIVIL,ZESTRIL) 10 MG tablet; Take 1 tablet by mouth " Daily.  Dispense: 90 tablet; Refill: 1    4. Mixed hyperlipidemia  -     Lipid Panel; Future  -     rosuvastatin (CRESTOR) 10 MG tablet; Take 1 tablet by mouth Every Night.  Dispense: 90 tablet; Refill: 1    5. Gastroesophageal reflux disease without esophagitis  -     pantoprazole (PROTONIX) 40 MG EC tablet; Take 1 tablet by mouth Daily.  Dispense: 90 tablet; Refill: 1    6. Primary osteoarthritis involving multiple joints    7. Screening for prostate cancer  -     PSA Screen; Future    8. Influenza vaccination administered at current visit  -     Fluzone High-Dose 65+yrs         Labs are reviewed with patient.  His glucose is 134, and his hemoglobin A1c is 7.0.  This is improved from the previous level of 7.41. He will continue with his current diabetic medications.  He may monitor blood sugar 1 time daily.  His LDL is 95. He will continue with Crestor 10 mg daily for treatment of hyperlipidemia.  His blood pressure is controlled, and he will continue with his current blood pressure medication.  He will continue with Protonix 40 mg daily for treatment of GERD.  He will continue to use an over-the-counter NSAID as needed for his osteoarthritis.     Patient is given a flu shot today.      PHQ-2/PHQ-9 Depression Screening 10/22/2021   Little interest or pleasure in doing things 0   Feeling down, depressed, or hopeless 0   Trouble falling or staying asleep, or sleeping too much -   Feeling tired or having little energy -   Poor appetite or overeating -   Feeling bad about yourself - or that you are a failure or have let yourself or your family down -   Trouble concentrating on things, such as reading the newspaper or watching television -   Moving or speaking so slowly that other people could have noticed. Or the opposite - being so fidgety or restless that you have been moving around a lot more than usual -   Thoughts that you would be better off dead, or of hurting yourself in some way -   Total Score 0         Lab  on 10/13/2021   Component Date Value Ref Range Status   • Glucose 10/13/2021 134* 70 - 99 mg/dL Final   • BUN 10/13/2021 17  7 - 23 mg/dL Final   • Creatinine 10/13/2021 0.80  0.70 - 1.30 mg/dL Final   • Sodium 10/13/2021 138  137 - 145 mmol/L Final   • Potassium 10/13/2021 4.1  3.4 - 5.0 mmol/L Final   • Chloride 10/13/2021 99* 101 - 112 mmol/L Final   • CO2 10/13/2021 29.0  22.0 - 30.0 mmol/L Final   • Calcium 10/13/2021 9.4  8.4 - 10.2 mg/dL Final   • Total Protein 10/13/2021 7.6  6.3 - 8.6 g/dL Final   • Albumin 10/13/2021 4.50  3.50 - 5.00 g/dL Final   • ALT (SGPT) 10/13/2021 20  <=50 U/L Final   • AST (SGOT) 10/13/2021 22  17 - 59 U/L Final   • Alkaline Phosphatase 10/13/2021 57  38 - 126 U/L Final   • Total Bilirubin 10/13/2021 0.6  0.2 - 1.3 mg/dL Final   • eGFR Non African Amer 10/13/2021 95  42 - 98 mL/min/1.73 Final   • Globulin 10/13/2021 3.1  2.3 - 3.5 gm/dL Final   • A/G Ratio 10/13/2021 1.5  1.1 - 1.8 g/dL Final   • BUN/Creatinine Ratio 10/13/2021 21.3  7.0 - 25.0 Final   • Anion Gap 10/13/2021 10.0  5.0 - 15.0 mmol/L Final   • Hemoglobin A1C 10/13/2021 7.00* 4.80 - 5.60 % Final   • LDL Cholesterol  10/13/2021 95  0 - 100 mg/dL Final   • Color, UA 10/13/2021 Yellow  Yellow, Straw Final   • Appearance, UA 10/13/2021 Clear  Clear Final   • pH, UA 10/13/2021 7.0  5.5 - 8.0 Final   • Specific Gravity, UA 10/13/2021 1.020  1.005 - 1.030 Final   • Glucose, UA 10/13/2021 Negative  Negative Final   • Ketones, UA 10/13/2021 Negative  Negative Final   • Bilirubin, UA 10/13/2021 Negative  Negative Final   • Blood, UA 10/13/2021 Negative  Negative Final   • Protein, UA 10/13/2021 Negative  Negative Final   • Leuk Esterase, UA 10/13/2021 Negative  Negative Final   • Nitrite, UA 10/13/2021 Negative  Negative Final   • Urobilinogen, UA 10/13/2021 0.2 E.U./dL  0.2 - 1.0 E.U./dL Final   • WBC 10/13/2021 6.71  3.40 - 10.80 10*3/mm3 Final   • RBC 10/13/2021 5.12  4.14 - 5.80 10*6/mm3 Final   • Hemoglobin 10/13/2021 15.3   13.0 - 17.7 g/dL Final   • Hematocrit 10/13/2021 46.5  37.5 - 51.0 % Final   • MCV 10/13/2021 90.8  79.0 - 97.0 fL Final   • MCH 10/13/2021 29.9  26.6 - 33.0 pg Final   • MCHC 10/13/2021 32.9  31.5 - 35.7 g/dL Final   • RDW 10/13/2021 12.6  12.3 - 15.4 % Final   • RDW-SD 10/13/2021 41.1  37.0 - 54.0 fl Final   • MPV 10/13/2021 8.8  6.0 - 12.0 fL Final   • Platelets 10/13/2021 225  140 - 450 10*3/mm3 Final   • Neutrophil % 10/13/2021 45.9  42.7 - 76.0 % Final   • Lymphocyte % 10/13/2021 40.8  19.6 - 45.3 % Final   • Monocyte % 10/13/2021 7.7  5.0 - 12.0 % Final   • Eosinophil % 10/13/2021 5.2  0.3 - 6.2 % Final   • Basophil % 10/13/2021 0.4  0.0 - 1.5 % Final   • Neutrophils, Absolute 10/13/2021 3.07  1.70 - 7.00 10*3/mm3 Final   • Lymphocytes, Absolute 10/13/2021 2.74  0.70 - 3.10 10*3/mm3 Final   • Monocytes, Absolute 10/13/2021 0.52  0.10 - 0.90 10*3/mm3 Final   • Eosinophils, Absolute 10/13/2021 0.35  0.00 - 0.40 10*3/mm3 Final   • Basophils, Absolute 10/13/2021 0.03  0.00 - 0.20 10*3/mm3 Final   ]        .  .  .  .

## 2021-10-21 NOTE — PROGRESS NOTES
QUICK REFERENCE INFORMATION:  The ABCs of the Annual Wellness Visit    Subsequent Medicare Wellness Visit    HEALTH RISK ASSESSMENT    1950    Recent Hospitalizations:  No hospitalization(s) within the last year..        Current Medical Providers:  Patient Care Team:  Anselmo Manriquez MD as PCP - General (Family Medicine)  Higinio Sanders (Optometry)        Smoking Status:  Social History     Tobacco Use   Smoking Status Never Smoker   Smokeless Tobacco Never Used       Alcohol Consumption:  Social History     Substance and Sexual Activity   Alcohol Use No       Depression Screen:   PHQ-2/PHQ-9 Depression Screening 10/22/2021   Little interest or pleasure in doing things 0   Feeling down, depressed, or hopeless 0   Trouble falling or staying asleep, or sleeping too much -   Feeling tired or having little energy -   Poor appetite or overeating -   Feeling bad about yourself - or that you are a failure or have let yourself or your family down -   Trouble concentrating on things, such as reading the newspaper or watching television -   Moving or speaking so slowly that other people could have noticed. Or the opposite - being so fidgety or restless that you have been moving around a lot more than usual -   Thoughts that you would be better off dead, or of hurting yourself in some way -   Total Score 0       Health Habits and Functional and Cognitive Screening:  Functional & Cognitive Status 10/22/2021   Do you have difficulty preparing food and eating? No   Do you have difficulty bathing yourself, getting dressed or grooming yourself? No   Do you have difficulty using the toilet? No   Do you have difficulty moving around from place to place? No   Do you have trouble with steps or getting out of a bed or a chair? No   Current Diet Well Balanced Diet   Dental Exam Not up to date   Eye Exam Up to date        Eye Exam Comment -   Exercise (times per week) 3 times per week   Current Exercises Include Walking;Yard Work    Current Exercise Activities Include -   Do you need help using the phone?  No   Are you deaf or do you have serious difficulty hearing?  No   Do you need help with transportation? No   Do you need help shopping? No   Do you need help preparing meals?  No   Do you need help with housework?  No   Do you need help with laundry? No   Do you need help taking your medications? No   Do you need help managing money? No   Do you ever drive or ride in a car without wearing a seat belt? No   Have you felt unusual stress, anger or loneliness in the last month? No   Who do you live with? Alone   If you need help, do you have trouble finding someone available to you? No   Have you been bothered in the last four weeks by sexual problems? No   Do you have difficulty concentrating, remembering or making decisions? Yes           Does the patient have evidence of cognitive impairment? No    Aspirin use counseling: Does not take ASA      Recent Lab Results:  CMP:  Lab Results   Component Value Date    BUN 17 10/13/2021    CREATININE 0.80 10/13/2021    EGFRIFNONA 95 10/13/2021    BCR 21.3 10/13/2021     10/13/2021    K 4.1 10/13/2021    CO2 29.0 10/13/2021    CALCIUM 9.4 10/13/2021    ALBUMIN 4.50 10/13/2021    BILITOT 0.6 10/13/2021    ALKPHOS 57 10/13/2021    AST 22 10/13/2021    ALT 20 10/13/2021     Lipid Panel:  Lab Results   Component Value Date    CHOL 160 02/09/2021    TRIG 112 02/09/2021    HDL 37 (L) 02/09/2021    VLDL 21 02/09/2021    LDLHDL 2.72 02/09/2021     HbA1c:  Lab Results   Component Value Date    HGBA1C 7.00 (H) 10/13/2021       Visual Acuity:  No exam data present    Age-appropriate Screening Schedule:  Refer to the list below for future screening recommendations based on patient's age, sex and/or medical conditions. Orders for these recommended tests are listed in the plan section. The patient has been provided with a written plan.    Health Maintenance   Topic Date Due   • TDAP/TD VACCINES (1 - Tdap)  Never done   • DIABETIC FOOT EXAM  06/02/2018   • PROSTATE CANCER SCREENING  02/09/2022   • DIABETIC EYE EXAM  04/05/2022   • HEMOGLOBIN A1C  04/13/2022   • URINE MICROALBUMIN  06/16/2022   • LIPID PANEL  10/13/2022   • INFLUENZA VACCINE  Completed   • ZOSTER VACCINE  Completed        Subjective   History of Present Illness    Ronald Irizarry is a 71 y.o. male who presents for an Subsequent Wellness Visit.    The following portions of the patient's history were reviewed and updated as appropriate: allergies, current medications, past family history, past medical history, past social history, past surgical history and problem list.    Outpatient Medications Prior to Visit   Medication Sig Dispense Refill   • Multiple Vitamins-Minerals (CENTRUM SILVER PO) Take 1 tablet by mouth daily.     • OneTouch Ultra test strip USE TO TEST BLOOD SUGAR ONCE DAILY 100 each 3   • Vitamin E 400 UNITS tablet Take 1 tablet by mouth daily.     • lisinopril (PRINIVIL,ZESTRIL) 10 MG tablet Take 1 tablet by mouth Daily. 90 tablet 1   • metFORMIN ER (GLUCOPHAGE-XR) 500 MG 24 hr tablet Take 2 tablets by mouth Every Evening. 180 tablet 1   • pantoprazole (PROTONIX) 40 MG EC tablet Take 1 tablet by mouth Daily. 90 tablet 1   • rosuvastatin (CRESTOR) 10 MG tablet Take 1 tablet by mouth Every Night. 90 tablet 1     No facility-administered medications prior to visit.       Patient Active Problem List   Diagnosis   • Type 2 diabetes mellitus without complication, without long-term current use of insulin (HCC)   • Mixed hyperlipidemia   • Essential hypertension   • Primary osteoarthritis involving multiple joints   • Gastroesophageal reflux disease without esophagitis   • No diabetic retinopathy in both eyes       Advance Care Planning:  ACP Discussion Status: ACP discussion was held with the patient during this visit. Patient does not have an advance directive, declines further assistance.      Identification of Risk Factors:  Risk factors  "include: Hypertension, Hyperlipidemia.    Review of Systems    Compared to one year ago, the patient feels his physical health is the same.  Compared to one year ago, the patient feels his mental health is the same.    Objective     Physical Exam    Vitals:    10/22/21 1256   BP: 120/70   BP Location: Left arm   Patient Position: Sitting   Cuff Size: Adult   Pulse: 87   Temp: 97.1 °F (36.2 °C)   TempSrc: Tympanic   SpO2: 98%   Weight: 68.5 kg (151 lb)   Height: 177.8 cm (70\")   PainSc: 0-No pain       Patient's Body mass index is 21.67 kg/m². BMI is within normal parameters. No follow-up required.      Assessment/Plan   Patient Self-Management and Personalized Health Advice  The patient has been provided with information about: diet and exercise and preventive services including:   · Exercise counseling provided, Fall Risk assessment done, Nutrition counseling provided. Prostate cancer screening and colon cancer screening discussed.  · Recommend annual flu vaccine.   · Recommend annual diabetic eye exam.    Visit Diagnoses:    ICD-10-CM ICD-9-CM   1. Medicare annual wellness visit, subsequent  Z00.00 V70.0   2. Type 2 diabetes mellitus without complication, without long-term current use of insulin (HCC)  E11.9 250.00   3. Essential hypertension  I10 401.9   4. Mixed hyperlipidemia  E78.2 272.2   5. Gastroesophageal reflux disease without esophagitis  K21.9 530.81   6. Primary osteoarthritis involving multiple joints  M89.49 715.98   7. Screening for prostate cancer  Z12.5 V76.44   8. Influenza vaccination administered at current visit  Z23 V04.81       Orders Placed This Encounter   Procedures   • Fluzone High-Dose 65+yrs   • Comprehensive Metabolic Panel     Standing Status:   Future     Standing Expiration Date:   10/21/2022     Order Specific Question:   Release to patient     Answer:   Immediate   • Hemoglobin A1c     Standing Status:   Future     Standing Expiration Date:   10/21/2022     Order Specific " Question:   Release to patient     Answer:   Immediate   • Lipid Panel     Standing Status:   Future     Standing Expiration Date:   10/21/2022   • PSA Screen     Standing Status:   Future     Standing Expiration Date:   10/21/2022     Order Specific Question:   Release to patient     Answer:   Immediate   • TSH     Standing Status:   Future     Standing Expiration Date:   10/21/2022     Order Specific Question:   Release to patient     Answer:   Immediate   • T4, Free     Standing Status:   Future     Standing Expiration Date:   10/21/2022     Order Specific Question:   Release to patient     Answer:   Immediate   • Urinalysis With Culture If Indicated -     Standing Status:   Future     Standing Expiration Date:   10/21/2022     Order Specific Question:   Release to patient     Answer:   Immediate   • CBC & Differential     Standing Status:   Future     Standing Expiration Date:   10/21/2022     Order Specific Question:   Manual Differential     Answer:   No       Outpatient Encounter Medications as of 10/22/2021   Medication Sig Dispense Refill   • lisinopril (PRINIVIL,ZESTRIL) 10 MG tablet Take 1 tablet by mouth Daily. 90 tablet 1   • metFORMIN ER (GLUCOPHAGE-XR) 500 MG 24 hr tablet Take 2 tablets by mouth Every Evening. 180 tablet 1   • Multiple Vitamins-Minerals (CENTRUM SILVER PO) Take 1 tablet by mouth daily.     • OneTouch Ultra test strip USE TO TEST BLOOD SUGAR ONCE DAILY 100 each 3   • pantoprazole (PROTONIX) 40 MG EC tablet Take 1 tablet by mouth Daily. 90 tablet 1   • rosuvastatin (CRESTOR) 10 MG tablet Take 1 tablet by mouth Every Night. 90 tablet 1   • Vitamin E 400 UNITS tablet Take 1 tablet by mouth daily.     • [DISCONTINUED] lisinopril (PRINIVIL,ZESTRIL) 10 MG tablet Take 1 tablet by mouth Daily. 90 tablet 1   • [DISCONTINUED] metFORMIN ER (GLUCOPHAGE-XR) 500 MG 24 hr tablet Take 2 tablets by mouth Every Evening. 180 tablet 1   • [DISCONTINUED] pantoprazole (PROTONIX) 40 MG EC tablet Take 1  tablet by mouth Daily. 90 tablet 1   • [DISCONTINUED] rosuvastatin (CRESTOR) 10 MG tablet Take 1 tablet by mouth Every Night. 90 tablet 1     No facility-administered encounter medications on file as of 10/22/2021.       Reviewed use of high risk medication in the elderly: yes  Reviewed for potential of harmful drug interactions in the elderly: yes    Follow Up:  Return in about 4 months (around 2/22/2022) for Next scheduled follow up, Or sooner as needed With Labs prior to appointment.     An After Visit Summary and PPPS with all of these plans were given to the patient.

## 2021-10-22 ENCOUNTER — OFFICE VISIT (OUTPATIENT)
Dept: FAMILY MEDICINE CLINIC | Facility: CLINIC | Age: 71
End: 2021-10-22

## 2021-10-22 VITALS
HEART RATE: 87 BPM | WEIGHT: 151 LBS | BODY MASS INDEX: 21.62 KG/M2 | TEMPERATURE: 97.1 F | OXYGEN SATURATION: 98 % | SYSTOLIC BLOOD PRESSURE: 120 MMHG | HEIGHT: 70 IN | DIASTOLIC BLOOD PRESSURE: 70 MMHG

## 2021-10-22 DIAGNOSIS — Z00.00 MEDICARE ANNUAL WELLNESS VISIT, SUBSEQUENT: Primary | ICD-10-CM

## 2021-10-22 DIAGNOSIS — E11.9 TYPE 2 DIABETES MELLITUS WITHOUT COMPLICATION, WITHOUT LONG-TERM CURRENT USE OF INSULIN (HCC): Chronic | ICD-10-CM

## 2021-10-22 DIAGNOSIS — Z12.5 SCREENING FOR PROSTATE CANCER: ICD-10-CM

## 2021-10-22 DIAGNOSIS — I10 ESSENTIAL HYPERTENSION: Chronic | ICD-10-CM

## 2021-10-22 DIAGNOSIS — M15.9 PRIMARY OSTEOARTHRITIS INVOLVING MULTIPLE JOINTS: Chronic | ICD-10-CM

## 2021-10-22 DIAGNOSIS — K21.9 GASTROESOPHAGEAL REFLUX DISEASE WITHOUT ESOPHAGITIS: Chronic | ICD-10-CM

## 2021-10-22 DIAGNOSIS — Z23 INFLUENZA VACCINATION ADMINISTERED AT CURRENT VISIT: ICD-10-CM

## 2021-10-22 DIAGNOSIS — E78.2 MIXED HYPERLIPIDEMIA: Chronic | ICD-10-CM

## 2021-10-22 PROCEDURE — G0008 ADMIN INFLUENZA VIRUS VAC: HCPCS | Performed by: INTERNAL MEDICINE

## 2021-10-22 PROCEDURE — 1159F MED LIST DOCD IN RCRD: CPT | Performed by: INTERNAL MEDICINE

## 2021-10-22 PROCEDURE — 90662 IIV NO PRSV INCREASED AG IM: CPT | Performed by: INTERNAL MEDICINE

## 2021-10-22 PROCEDURE — 1126F AMNT PAIN NOTED NONE PRSNT: CPT | Performed by: INTERNAL MEDICINE

## 2021-10-22 PROCEDURE — G0439 PPPS, SUBSEQ VISIT: HCPCS | Performed by: INTERNAL MEDICINE

## 2021-10-22 RX ORDER — LISINOPRIL 10 MG/1
10 TABLET ORAL DAILY
Qty: 90 TABLET | Refills: 1 | Status: SHIPPED | OUTPATIENT
Start: 2021-10-22 | End: 2022-02-21 | Stop reason: SDUPTHER

## 2021-10-22 RX ORDER — PANTOPRAZOLE SODIUM 40 MG/1
40 TABLET, DELAYED RELEASE ORAL DAILY
Qty: 90 TABLET | Refills: 1 | Status: SHIPPED | OUTPATIENT
Start: 2021-10-22 | End: 2022-02-21 | Stop reason: SDUPTHER

## 2021-10-22 RX ORDER — METFORMIN HYDROCHLORIDE 500 MG/1
1000 TABLET, EXTENDED RELEASE ORAL EVERY EVENING
Qty: 180 TABLET | Refills: 1 | Status: SHIPPED | OUTPATIENT
Start: 2021-10-22 | End: 2022-02-21 | Stop reason: SDUPTHER

## 2021-10-22 RX ORDER — ROSUVASTATIN CALCIUM 10 MG/1
10 TABLET, COATED ORAL NIGHTLY
Qty: 90 TABLET | Refills: 1 | Status: SHIPPED | OUTPATIENT
Start: 2021-10-22 | End: 2022-01-20

## 2021-11-23 DIAGNOSIS — E78.2 MIXED HYPERLIPIDEMIA: Chronic | ICD-10-CM

## 2021-11-23 DIAGNOSIS — I10 ESSENTIAL HYPERTENSION: Chronic | ICD-10-CM

## 2021-11-23 RX ORDER — LISINOPRIL 10 MG/1
10 TABLET ORAL DAILY
Qty: 90 TABLET | Refills: 1 | OUTPATIENT
Start: 2021-11-23

## 2021-11-23 RX ORDER — ROSUVASTATIN CALCIUM 10 MG/1
10 TABLET, COATED ORAL NIGHTLY
Qty: 90 TABLET | Refills: 1 | OUTPATIENT
Start: 2021-11-23

## 2022-01-04 DIAGNOSIS — K21.9 GASTROESOPHAGEAL REFLUX DISEASE WITHOUT ESOPHAGITIS: Chronic | ICD-10-CM

## 2022-01-04 DIAGNOSIS — E11.9 TYPE 2 DIABETES MELLITUS WITHOUT COMPLICATION, WITHOUT LONG-TERM CURRENT USE OF INSULIN: Chronic | ICD-10-CM

## 2022-01-04 RX ORDER — BLOOD SUGAR DIAGNOSTIC
STRIP MISCELLANEOUS
Qty: 100 EACH | Refills: 3 | OUTPATIENT
Start: 2022-01-04

## 2022-01-04 RX ORDER — PANTOPRAZOLE SODIUM 40 MG/1
40 TABLET, DELAYED RELEASE ORAL DAILY
Qty: 90 TABLET | Refills: 1 | OUTPATIENT
Start: 2022-01-04

## 2022-01-04 RX ORDER — METFORMIN HYDROCHLORIDE 500 MG/1
1000 TABLET, EXTENDED RELEASE ORAL EVERY EVENING
Qty: 180 TABLET | Refills: 1 | OUTPATIENT
Start: 2022-01-04

## 2022-01-10 DIAGNOSIS — E11.9 TYPE 2 DIABETES MELLITUS WITHOUT COMPLICATION, WITHOUT LONG-TERM CURRENT USE OF INSULIN: Chronic | ICD-10-CM

## 2022-01-10 RX ORDER — BLOOD SUGAR DIAGNOSTIC
STRIP MISCELLANEOUS
Qty: 100 EACH | Refills: 3 | Status: SHIPPED | OUTPATIENT
Start: 2022-01-10 | End: 2022-05-24

## 2022-01-20 DIAGNOSIS — E78.2 MIXED HYPERLIPIDEMIA: Chronic | ICD-10-CM

## 2022-01-20 RX ORDER — ROSUVASTATIN CALCIUM 10 MG/1
10 TABLET, COATED ORAL NIGHTLY
Qty: 90 TABLET | Refills: 1 | Status: SHIPPED | OUTPATIENT
Start: 2022-01-20 | End: 2022-06-20 | Stop reason: SDUPTHER

## 2022-02-15 ENCOUNTER — LAB (OUTPATIENT)
Dept: LAB | Facility: OTHER | Age: 72
End: 2022-02-15

## 2022-02-15 DIAGNOSIS — E11.9 TYPE 2 DIABETES MELLITUS WITHOUT COMPLICATION, WITHOUT LONG-TERM CURRENT USE OF INSULIN: Chronic | ICD-10-CM

## 2022-02-15 DIAGNOSIS — Z12.5 SCREENING FOR PROSTATE CANCER: ICD-10-CM

## 2022-02-15 DIAGNOSIS — I10 ESSENTIAL HYPERTENSION: ICD-10-CM

## 2022-02-15 DIAGNOSIS — E78.2 MIXED HYPERLIPIDEMIA: Chronic | ICD-10-CM

## 2022-02-15 LAB
ALBUMIN SERPL-MCNC: 4.1 G/DL (ref 3.5–5)
ALBUMIN/GLOB SERPL: 1.5 G/DL (ref 1.1–1.8)
ALP SERPL-CCNC: 52 U/L (ref 38–126)
ALT SERPL W P-5'-P-CCNC: 16 U/L
ANION GAP SERPL CALCULATED.3IONS-SCNC: 6 MMOL/L (ref 5–15)
AST SERPL-CCNC: 21 U/L (ref 17–59)
BASOPHILS # BLD AUTO: 0.01 10*3/MM3 (ref 0–0.2)
BASOPHILS NFR BLD AUTO: 0.2 % (ref 0–1.5)
BILIRUB SERPL-MCNC: 0.6 MG/DL (ref 0.2–1.3)
BILIRUB UR QL STRIP: NEGATIVE
BUN SERPL-MCNC: 18 MG/DL (ref 7–23)
BUN/CREAT SERPL: 21.7 (ref 7–25)
CALCIUM SPEC-SCNC: 9 MG/DL (ref 8.4–10.2)
CHLORIDE SERPL-SCNC: 102 MMOL/L (ref 101–112)
CHOLEST SERPL-MCNC: 141 MG/DL (ref 150–200)
CLARITY UR: CLEAR
CO2 SERPL-SCNC: 31 MMOL/L (ref 22–30)
COLOR UR: YELLOW
CREAT SERPL-MCNC: 0.83 MG/DL (ref 0.7–1.3)
DEPRECATED RDW RBC AUTO: 42.5 FL (ref 37–54)
EOSINOPHIL # BLD AUTO: 0.31 10*3/MM3 (ref 0–0.4)
EOSINOPHIL NFR BLD AUTO: 5.4 % (ref 0.3–6.2)
ERYTHROCYTE [DISTWIDTH] IN BLOOD BY AUTOMATED COUNT: 12.8 % (ref 12.3–15.4)
GFR SERPL CREATININE-BSD FRML MDRD: 91 ML/MIN/1.73 (ref 42–98)
GLOBULIN UR ELPH-MCNC: 2.7 GM/DL (ref 2.3–3.5)
GLUCOSE SERPL-MCNC: 122 MG/DL (ref 70–99)
GLUCOSE UR STRIP-MCNC: NEGATIVE MG/DL
HBA1C MFR BLD: 7.1 % (ref 4.8–5.6)
HCT VFR BLD AUTO: 45.2 % (ref 37.5–51)
HDLC SERPL-MCNC: 42 MG/DL (ref 40–59)
HGB BLD-MCNC: 14.5 G/DL (ref 13–17.7)
HGB UR QL STRIP.AUTO: NEGATIVE
KETONES UR QL STRIP: NEGATIVE
LDLC SERPL CALC-MCNC: 81 MG/DL
LDLC/HDLC SERPL: 1.91 {RATIO} (ref 0–3.55)
LEUKOCYTE ESTERASE UR QL STRIP.AUTO: NEGATIVE
LYMPHOCYTES # BLD AUTO: 2.47 10*3/MM3 (ref 0.7–3.1)
LYMPHOCYTES NFR BLD AUTO: 42.7 % (ref 19.6–45.3)
MCH RBC QN AUTO: 29.7 PG (ref 26.6–33)
MCHC RBC AUTO-ENTMCNC: 32.1 G/DL (ref 31.5–35.7)
MCV RBC AUTO: 92.4 FL (ref 79–97)
MONOCYTES # BLD AUTO: 0.42 10*3/MM3 (ref 0.1–0.9)
MONOCYTES NFR BLD AUTO: 7.3 % (ref 5–12)
NEUTROPHILS NFR BLD AUTO: 2.58 10*3/MM3 (ref 1.7–7)
NEUTROPHILS NFR BLD AUTO: 44.4 % (ref 42.7–76)
NITRITE UR QL STRIP: NEGATIVE
PH UR STRIP.AUTO: 7 [PH] (ref 5.5–8)
PLATELET # BLD AUTO: 226 10*3/MM3 (ref 140–450)
PMV BLD AUTO: 9 FL (ref 6–12)
POTASSIUM SERPL-SCNC: 4 MMOL/L (ref 3.4–5)
PROT SERPL-MCNC: 6.8 G/DL (ref 6.3–8.6)
PROT UR QL STRIP: NEGATIVE
PSA SERPL-MCNC: 1.2 NG/ML (ref 0–4)
RBC # BLD AUTO: 4.89 10*6/MM3 (ref 4.14–5.8)
SODIUM SERPL-SCNC: 139 MMOL/L (ref 137–145)
SP GR UR STRIP: 1.02 (ref 1–1.03)
T4 FREE SERPL-MCNC: 1.49 NG/DL (ref 0.93–1.7)
TRIGL SERPL-MCNC: 94 MG/DL
TSH SERPL DL<=0.05 MIU/L-ACNC: 1.2 UIU/ML (ref 0.27–4.2)
UROBILINOGEN UR QL STRIP: NORMAL
VLDLC SERPL-MCNC: 18 MG/DL (ref 5–40)
WBC NRBC COR # BLD: 5.79 10*3/MM3 (ref 3.4–10.8)

## 2022-02-15 PROCEDURE — G0103 PSA SCREENING: HCPCS | Performed by: INTERNAL MEDICINE

## 2022-02-15 PROCEDURE — 81003 URINALYSIS AUTO W/O SCOPE: CPT | Performed by: INTERNAL MEDICINE

## 2022-02-15 PROCEDURE — 85025 COMPLETE CBC W/AUTO DIFF WBC: CPT | Performed by: INTERNAL MEDICINE

## 2022-02-15 PROCEDURE — 80061 LIPID PANEL: CPT | Performed by: INTERNAL MEDICINE

## 2022-02-15 PROCEDURE — 84443 ASSAY THYROID STIM HORMONE: CPT | Performed by: INTERNAL MEDICINE

## 2022-02-15 PROCEDURE — 83036 HEMOGLOBIN GLYCOSYLATED A1C: CPT | Performed by: INTERNAL MEDICINE

## 2022-02-15 PROCEDURE — 80053 COMPREHEN METABOLIC PANEL: CPT | Performed by: INTERNAL MEDICINE

## 2022-02-15 PROCEDURE — 36415 COLL VENOUS BLD VENIPUNCTURE: CPT | Performed by: INTERNAL MEDICINE

## 2022-02-15 PROCEDURE — 84439 ASSAY OF FREE THYROXINE: CPT | Performed by: INTERNAL MEDICINE

## 2022-02-21 ENCOUNTER — OFFICE VISIT (OUTPATIENT)
Dept: FAMILY MEDICINE CLINIC | Facility: CLINIC | Age: 72
End: 2022-02-21

## 2022-02-21 VITALS
BODY MASS INDEX: 21.05 KG/M2 | SYSTOLIC BLOOD PRESSURE: 126 MMHG | OXYGEN SATURATION: 98 % | HEART RATE: 82 BPM | DIASTOLIC BLOOD PRESSURE: 80 MMHG | TEMPERATURE: 96 F | WEIGHT: 147 LBS | HEIGHT: 70 IN

## 2022-02-21 DIAGNOSIS — I10 ESSENTIAL HYPERTENSION: Chronic | ICD-10-CM

## 2022-02-21 DIAGNOSIS — E78.2 MIXED HYPERLIPIDEMIA: Chronic | ICD-10-CM

## 2022-02-21 DIAGNOSIS — K21.9 GASTROESOPHAGEAL REFLUX DISEASE WITHOUT ESOPHAGITIS: Chronic | ICD-10-CM

## 2022-02-21 DIAGNOSIS — E11.9 TYPE 2 DIABETES MELLITUS WITHOUT COMPLICATION, WITHOUT LONG-TERM CURRENT USE OF INSULIN: Primary | Chronic | ICD-10-CM

## 2022-02-21 PROCEDURE — 99214 OFFICE O/P EST MOD 30 MIN: CPT | Performed by: INTERNAL MEDICINE

## 2022-02-21 RX ORDER — PANTOPRAZOLE SODIUM 40 MG/1
40 TABLET, DELAYED RELEASE ORAL DAILY
Qty: 90 TABLET | Refills: 1 | Status: SHIPPED | OUTPATIENT
Start: 2022-02-21 | End: 2022-06-20 | Stop reason: SDUPTHER

## 2022-02-21 RX ORDER — LISINOPRIL 10 MG/1
10 TABLET ORAL DAILY
Qty: 90 TABLET | Refills: 1 | Status: SHIPPED | OUTPATIENT
Start: 2022-02-21 | End: 2022-06-20 | Stop reason: SDUPTHER

## 2022-02-21 RX ORDER — METFORMIN HYDROCHLORIDE 500 MG/1
1000 TABLET, EXTENDED RELEASE ORAL EVERY EVENING
Qty: 180 TABLET | Refills: 1 | Status: SHIPPED | OUTPATIENT
Start: 2022-02-21 | End: 2022-06-20 | Stop reason: SDUPTHER

## 2022-02-21 NOTE — PROGRESS NOTES
Chief Complaint   Patient presents with   • Hypertension   • Hyperlipidemia   • Type 2 diabetes mellitus without complication, without long-   • Gastroesophageal reflux disease without esophagitis     Subjective   Ronald Irizarry is a 72 y.o. male who presents to the office for follow-up and review of labs.  He has diabetes, and his blood sugar has been doing well.  He takes Metformin ER, 1000 mg daily with his evening meal.  He is compliant with his medication. He monitors blood sugar 1 time daily.  He has hyperlipidemia and takes Crestor 10 mg daily.  He has hypertension and his blood pressure has been controlled.  He has GERD and takes Protonix 40 mg daily.  He has osteoarthritis and takes an over-the-counter NSAID as needed.    History of Present Illness has been reviewed and validated on 02/21/2022 and updated with any changes.    The following portions of the patient's history were reviewed and updated as appropriate: allergies, current medications, past family history, past medical history, past social history, past surgical history and problem list.    Review of Systems   Constitutional: Negative for chills, fatigue and fever.   HENT: Negative for congestion, sneezing, sore throat and trouble swallowing.    Eyes: Negative for visual disturbance.   Respiratory: Negative for cough, chest tightness, shortness of breath and wheezing.    Cardiovascular: Negative for chest pain, palpitations and leg swelling.   Gastrointestinal: Negative for abdominal pain, constipation, diarrhea, nausea and vomiting.   Genitourinary: Negative for dysuria, frequency and urgency.   Musculoskeletal: Negative for neck pain.   Skin: Negative for rash.   Neurological: Negative for dizziness, weakness and headaches.   Psychiatric/Behavioral:        Patient denies any feelings of depression and has not felt down, hopeless or lost interest in any activities.   All other systems reviewed and are negative.  Review of systems has been  "reviewed and validated on 02/21/2022 and updated with any changes.      Objective   Vitals:    02/21/22 1431   BP: 126/80   BP Location: Left arm   Patient Position: Sitting   Cuff Size: Large Adult   Pulse: 82  Comment: regular   Temp: 96 °F (35.6 °C)   TempSrc: Tympanic   SpO2: 98%   Weight: 66.7 kg (147 lb)   Height: 177 cm (69.69\")   PainSc: 0-No pain      Body mass index is 21.28 kg/m².    Physical Exam   Constitutional: He is oriented to person, place, and time. He appears well-developed. No distress.   HENT:   Head: Normocephalic and atraumatic.   Eyes: Pupils are equal, round, and reactive to light. Conjunctivae are normal. No scleral icterus.   Cardiovascular: Normal rate, regular rhythm and normal heart sounds. Exam reveals no gallop and no friction rub.   No murmur heard.  Pulmonary/Chest: Effort normal and breath sounds normal. No respiratory distress. He has no wheezes. He has no rales.   Musculoskeletal: Normal range of motion.   Lymphadenopathy:     He has no cervical adenopathy.   Neurological: He is alert and oriented to person, place, and time. No cranial nerve deficit.   Skin: Skin is warm and dry. No rash noted.   He has a pinpoint dark appearing lesion on the left arm.   Psychiatric: His behavior is normal. Judgment and thought content normal.   Nursing note and vitals reviewed.  Physical exam has been reviewed and validated on 02/21/2022 and updated with any changes.      Assessment/Plan   Diagnoses and all orders for this visit:    1. Type 2 diabetes mellitus without complication, without long-term current use of insulin (HCC) (Primary)  -     Hemoglobin A1c; Future  -     Microalbumin / Creatinine Urine Ratio - Urine, Clean Catch; Future  -     metFORMIN ER (GLUCOPHAGE-XR) 500 MG 24 hr tablet; Take 2 tablets by mouth Every Evening.  Dispense: 180 tablet; Refill: 1    2. Essential hypertension  -     CBC & Differential; Future  -     Comprehensive Metabolic Panel; Future  -     lisinopril " (PRINIVIL,ZESTRIL) 10 MG tablet; Take 1 tablet by mouth Daily.  Dispense: 90 tablet; Refill: 1    3. Mixed hyperlipidemia  -     LDL Cholesterol, Direct; Future    4. Gastroesophageal reflux disease without esophagitis  -     pantoprazole (PROTONIX) 40 MG EC tablet; Take 1 tablet by mouth Daily.  Dispense: 90 tablet; Refill: 1         Labs are reviewed with patient.  His glucose is 122, and his hemoglobin A1c is 7.10. He will continue with his current diabetic medications.  He may monitor blood sugar 1 time daily.  His total cholesterol is 141, LDL 81 and triglycerides 94.  His HDL is 42.  He will continue with Crestor 10 mg daily for treatment of hyperlipidemia.  His blood pressure is controlled, and he will continue with his current blood pressure medication.  He will continue with Protonix 40 mg daily for treatment of GERD.  He will continue to use an over-the-counter NSAID as needed for his osteoarthritis.  His PSA is normal at 1.20.      PHQ-2/PHQ-9 Depression Screening 2/21/2022   Little interest or pleasure in doing things 0   Feeling down, depressed, or hopeless 0   Trouble falling or staying asleep, or sleeping too much -   Feeling tired or having little energy -   Poor appetite or overeating -   Feeling bad about yourself - or that you are a failure or have let yourself or your family down -   Trouble concentrating on things, such as reading the newspaper or watching television -   Moving or speaking so slowly that other people could have noticed. Or the opposite - being so fidgety or restless that you have been moving around a lot more than usual -   Thoughts that you would be better off dead, or of hurting yourself in some way -   Total Score 0         Lab on 02/15/2022   Component Date Value Ref Range Status   • Glucose 02/15/2022 122* 70 - 99 mg/dL Final   • BUN 02/15/2022 18  7 - 23 mg/dL Final   • Creatinine 02/15/2022 0.83  0.70 - 1.30 mg/dL Final   • Sodium 02/15/2022 139  137 - 145 mmol/L Final    • Potassium 02/15/2022 4.0  3.4 - 5.0 mmol/L Final   • Chloride 02/15/2022 102  101 - 112 mmol/L Final   • CO2 02/15/2022 31.0* 22.0 - 30.0 mmol/L Final   • Calcium 02/15/2022 9.0  8.4 - 10.2 mg/dL Final   • Total Protein 02/15/2022 6.8  6.3 - 8.6 g/dL Final   • Albumin 02/15/2022 4.10  3.50 - 5.00 g/dL Final   • ALT (SGPT) 02/15/2022 16  <=50 U/L Final   • AST (SGOT) 02/15/2022 21  17 - 59 U/L Final   • Alkaline Phosphatase 02/15/2022 52  38 - 126 U/L Final   • Total Bilirubin 02/15/2022 0.6  0.2 - 1.3 mg/dL Final   • eGFR Non  Amer 02/15/2022 91  42 - 98 mL/min/1.73 Final   • Globulin 02/15/2022 2.7  2.3 - 3.5 gm/dL Final   • A/G Ratio 02/15/2022 1.5  1.1 - 1.8 g/dL Final   • BUN/Creatinine Ratio 02/15/2022 21.7  7.0 - 25.0 Final   • Anion Gap 02/15/2022 6.0  5.0 - 15.0 mmol/L Final   • Hemoglobin A1C 02/15/2022 7.10* 4.80 - 5.60 % Final   • Total Cholesterol 02/15/2022 141* 150 - 200 mg/dL Final   • Triglycerides 02/15/2022 94  <=150 mg/dL Final   • HDL Cholesterol 02/15/2022 42  40 - 59 mg/dL Final   • LDL Cholesterol  02/15/2022 81  <=100 mg/dL Final   • VLDL Cholesterol 02/15/2022 18  5 - 40 mg/dL Final   • LDL/HDL Ratio 02/15/2022 1.91  0.00 - 3.55 Final   • PSA 02/15/2022 1.200  0.000 - 4.000 ng/mL Final   • TSH 02/15/2022 1.200  0.270 - 4.200 uIU/mL Final   • Free T4 02/15/2022 1.49  0.93 - 1.70 ng/dL Final   • Color, UA 02/15/2022 Yellow  Yellow, Straw Final   • Appearance, UA 02/15/2022 Clear  Clear Final   • pH, UA 02/15/2022 7.0  5.5 - 8.0 Final   • Specific Gravity, UA 02/15/2022 1.020  1.005 - 1.030 Final   • Glucose, UA 02/15/2022 Negative  Negative Final   • Ketones, UA 02/15/2022 Negative  Negative Final   • Bilirubin, UA 02/15/2022 Negative  Negative Final   • Blood, UA 02/15/2022 Negative  Negative Final   • Protein, UA 02/15/2022 Negative  Negative Final   • Leuk Esterase, UA 02/15/2022 Negative  Negative Final   • Nitrite, UA 02/15/2022 Negative  Negative Final   • Urobilinogen, UA  02/15/2022 0.2 E.U./dL  0.2 - 1.0 E.U./dL Final   • WBC 02/15/2022 5.79  3.40 - 10.80 10*3/mm3 Final   • RBC 02/15/2022 4.89  4.14 - 5.80 10*6/mm3 Final   • Hemoglobin 02/15/2022 14.5  13.0 - 17.7 g/dL Final   • Hematocrit 02/15/2022 45.2  37.5 - 51.0 % Final   • MCV 02/15/2022 92.4  79.0 - 97.0 fL Final   • MCH 02/15/2022 29.7  26.6 - 33.0 pg Final   • MCHC 02/15/2022 32.1  31.5 - 35.7 g/dL Final   • RDW 02/15/2022 12.8  12.3 - 15.4 % Final   • RDW-SD 02/15/2022 42.5  37.0 - 54.0 fl Final   • MPV 02/15/2022 9.0  6.0 - 12.0 fL Final   • Platelets 02/15/2022 226  140 - 450 10*3/mm3 Final   • Neutrophil % 02/15/2022 44.4  42.7 - 76.0 % Final   • Lymphocyte % 02/15/2022 42.7  19.6 - 45.3 % Final   • Monocyte % 02/15/2022 7.3  5.0 - 12.0 % Final   • Eosinophil % 02/15/2022 5.4  0.3 - 6.2 % Final   • Basophil % 02/15/2022 0.2  0.0 - 1.5 % Final   • Neutrophils, Absolute 02/15/2022 2.58  1.70 - 7.00 10*3/mm3 Final   • Lymphocytes, Absolute 02/15/2022 2.47  0.70 - 3.10 10*3/mm3 Final   • Monocytes, Absolute 02/15/2022 0.42  0.10 - 0.90 10*3/mm3 Final   • Eosinophils, Absolute 02/15/2022 0.31  0.00 - 0.40 10*3/mm3 Final   • Basophils, Absolute 02/15/2022 0.01  0.00 - 0.20 10*3/mm3 Final   ]        .  .  .  .

## 2022-05-03 ENCOUNTER — TELEPHONE (OUTPATIENT)
Dept: FAMILY MEDICINE CLINIC | Facility: CLINIC | Age: 72
End: 2022-05-03

## 2022-05-03 NOTE — TELEPHONE ENCOUNTER
The patient called and stated he was struggling with cold like symptoms. He complained of an over all feeling of illness, congestion, stiff muscles, and a reoccurrent low grade temperature. He asked if he could make an appointment with Dr. Manriquez. I explained he is out of office this week. I stated I could send his message to a covering provider or help him schedule a video visit with someone in  clinic. He said he felt uncomfortable with another provider and said he may get tested for COVID at the [facility]. I gave him my name and number if he needed further contact.

## 2022-05-24 DIAGNOSIS — E11.9 TYPE 2 DIABETES MELLITUS WITHOUT COMPLICATION, WITHOUT LONG-TERM CURRENT USE OF INSULIN: Primary | ICD-10-CM

## 2022-05-24 RX ORDER — BLOOD-GLUCOSE METER
KIT MISCELLANEOUS
Qty: 1 EACH | Refills: 0 | Status: SHIPPED | OUTPATIENT
Start: 2022-05-24 | End: 2022-06-20

## 2022-05-24 RX ORDER — LANCETS 30 GAUGE
EACH MISCELLANEOUS
Qty: 100 EACH | Refills: 3 | Status: SHIPPED | OUTPATIENT
Start: 2022-05-24 | End: 2023-02-27 | Stop reason: SDUPTHER

## 2022-06-13 ENCOUNTER — TELEPHONE (OUTPATIENT)
Dept: FAMILY MEDICINE CLINIC | Facility: CLINIC | Age: 72
End: 2022-06-13

## 2022-06-13 DIAGNOSIS — R53.83 FATIGUE, UNSPECIFIED TYPE: Primary | ICD-10-CM

## 2022-06-13 NOTE — TELEPHONE ENCOUNTER
I read Dr Manriquez's message to the patient. The patient voiced understanding, had no additional questions, and thanked me for the call.

## 2022-06-13 NOTE — TELEPHONE ENCOUNTER
Patient called to advise he is wanting to change to a different multi-vitamin. It has increased B12. He would like to know if his B12 levels have been checked and if he would benefit from increased B12. He is coming in for labs soon and would like his B12 level checked if Dr Manriquez's feels it warranted.

## 2022-06-13 NOTE — TELEPHONE ENCOUNTER
I have not directly checked a vitamin B12 level.  He does not have a history of anemia.  His hemoglobin hematocrit of always been normal.  His MCV has also been normal.  This implies that he is getting adequate B12.  If he takes a supplement with a higher dose, that will be okay.  His body will use only what it needs.  Please add a vitamin B12 level to his next labs.

## 2022-06-14 ENCOUNTER — LAB (OUTPATIENT)
Dept: LAB | Facility: OTHER | Age: 72
End: 2022-06-14

## 2022-06-14 DIAGNOSIS — E11.9 TYPE 2 DIABETES MELLITUS WITHOUT COMPLICATION, WITHOUT LONG-TERM CURRENT USE OF INSULIN: Chronic | ICD-10-CM

## 2022-06-14 DIAGNOSIS — I10 ESSENTIAL HYPERTENSION: ICD-10-CM

## 2022-06-14 DIAGNOSIS — R53.83 FATIGUE, UNSPECIFIED TYPE: ICD-10-CM

## 2022-06-14 DIAGNOSIS — E78.2 MIXED HYPERLIPIDEMIA: Chronic | ICD-10-CM

## 2022-06-14 LAB
ALBUMIN SERPL-MCNC: 4.1 G/DL (ref 3.5–5)
ALBUMIN UR-MCNC: <1.2 MG/DL
ALBUMIN/GLOB SERPL: 1.5 G/DL (ref 1.1–1.8)
ALP SERPL-CCNC: 54 U/L (ref 38–126)
ALT SERPL W P-5'-P-CCNC: 21 U/L
ANION GAP SERPL CALCULATED.3IONS-SCNC: 7 MMOL/L (ref 5–15)
AST SERPL-CCNC: 24 U/L (ref 17–59)
BASOPHILS # BLD AUTO: 0.02 10*3/MM3 (ref 0–0.2)
BASOPHILS NFR BLD AUTO: 0.3 % (ref 0–1.5)
BILIRUB SERPL-MCNC: 0.9 MG/DL (ref 0.2–1.3)
BUN SERPL-MCNC: 16 MG/DL (ref 7–23)
BUN/CREAT SERPL: 20.8 (ref 7–25)
CALCIUM SPEC-SCNC: 8.9 MG/DL (ref 8.4–10.2)
CHLORIDE SERPL-SCNC: 101 MMOL/L (ref 101–112)
CO2 SERPL-SCNC: 30 MMOL/L (ref 22–30)
CREAT SERPL-MCNC: 0.77 MG/DL (ref 0.7–1.3)
CREAT UR-MCNC: 61.6 MG/DL
DEPRECATED RDW RBC AUTO: 41.8 FL (ref 37–54)
EGFRCR SERPLBLD CKD-EPI 2021: 94.5 ML/MIN/1.73
EOSINOPHIL # BLD AUTO: 0.37 10*3/MM3 (ref 0–0.4)
EOSINOPHIL NFR BLD AUTO: 5.7 % (ref 0.3–6.2)
ERYTHROCYTE [DISTWIDTH] IN BLOOD BY AUTOMATED COUNT: 12.8 % (ref 12.3–15.4)
GLOBULIN UR ELPH-MCNC: 2.7 GM/DL (ref 2.3–3.5)
GLUCOSE SERPL-MCNC: 121 MG/DL (ref 70–99)
HBA1C MFR BLD: 6.9 % (ref 4.8–5.6)
HCT VFR BLD AUTO: 43.6 % (ref 37.5–51)
HGB BLD-MCNC: 14 G/DL (ref 13–17.7)
LYMPHOCYTES # BLD AUTO: 2.71 10*3/MM3 (ref 0.7–3.1)
LYMPHOCYTES NFR BLD AUTO: 41.8 % (ref 19.6–45.3)
MCH RBC QN AUTO: 29.2 PG (ref 26.6–33)
MCHC RBC AUTO-ENTMCNC: 32.1 G/DL (ref 31.5–35.7)
MCV RBC AUTO: 91 FL (ref 79–97)
MICROALBUMIN/CREAT UR: NORMAL MG/G{CREAT}
MONOCYTES # BLD AUTO: 0.57 10*3/MM3 (ref 0.1–0.9)
MONOCYTES NFR BLD AUTO: 8.8 % (ref 5–12)
NEUTROPHILS NFR BLD AUTO: 2.81 10*3/MM3 (ref 1.7–7)
NEUTROPHILS NFR BLD AUTO: 43.4 % (ref 42.7–76)
PLATELET # BLD AUTO: 225 10*3/MM3 (ref 140–450)
PMV BLD AUTO: 8.7 FL (ref 6–12)
POTASSIUM SERPL-SCNC: 4 MMOL/L (ref 3.4–5)
PROT SERPL-MCNC: 6.8 G/DL (ref 6.3–8.6)
RBC # BLD AUTO: 4.79 10*6/MM3 (ref 4.14–5.8)
SODIUM SERPL-SCNC: 138 MMOL/L (ref 137–145)
WBC NRBC COR # BLD: 6.48 10*3/MM3 (ref 3.4–10.8)

## 2022-06-14 PROCEDURE — 85025 COMPLETE CBC W/AUTO DIFF WBC: CPT | Performed by: INTERNAL MEDICINE

## 2022-06-14 PROCEDURE — 83721 ASSAY OF BLOOD LIPOPROTEIN: CPT | Performed by: INTERNAL MEDICINE

## 2022-06-14 PROCEDURE — 83036 HEMOGLOBIN GLYCOSYLATED A1C: CPT | Performed by: INTERNAL MEDICINE

## 2022-06-14 PROCEDURE — 36415 COLL VENOUS BLD VENIPUNCTURE: CPT | Performed by: INTERNAL MEDICINE

## 2022-06-14 PROCEDURE — 82570 ASSAY OF URINE CREATININE: CPT | Performed by: INTERNAL MEDICINE

## 2022-06-14 PROCEDURE — 80053 COMPREHEN METABOLIC PANEL: CPT | Performed by: INTERNAL MEDICINE

## 2022-06-14 PROCEDURE — 82043 UR ALBUMIN QUANTITATIVE: CPT | Performed by: INTERNAL MEDICINE

## 2022-06-14 PROCEDURE — 82607 VITAMIN B-12: CPT | Performed by: INTERNAL MEDICINE

## 2022-06-15 LAB
ARTICHOKE IGE QN: 90 MG/DL (ref 0–100)
VIT B12 BLD-MCNC: 584 PG/ML (ref 211–946)

## 2022-06-17 NOTE — PROGRESS NOTES
Chief Complaint   Patient presents with   • Type 2 diabetes mellitus without complication, without long   • Mixed hyperlipidemia   • Essential hypertension   • Gastroesophageal reflux disease without esophagitis     Subjective   Ronald Irizarry is a 72 y.o. male who presents to the office for follow-up and review of labs.  He has diabetes, and his blood sugar has been doing well.  He takes Metformin ER, 1000 mg daily with his evening meal.  He is compliant with his medication. He monitors blood sugar 1 time daily.  He has hyperlipidemia and takes Crestor 10 mg daily.  He has hypertension and his blood pressure has been controlled.  He has GERD and takes Protonix 40 mg daily.  He has osteoarthritis and takes an over-the-counter NSAID as needed.  He is concerned about his vitamin B12 level, and requested it to be checked with his recent labs.  He takes a daily multivitamin which has a B12 supplement.    History of Present Illness has been reviewed and validated on 06/20/2022 and updated with any changes.    The following portions of the patient's history were reviewed and updated as appropriate: allergies, current medications, past family history, past medical history, past social history, past surgical history and problem list.    Review of Systems   Constitutional: Negative for chills, fatigue and fever.   HENT: Negative for congestion, sneezing, sore throat and trouble swallowing.    Eyes: Negative for visual disturbance.   Respiratory: Negative for cough, chest tightness, shortness of breath and wheezing.    Cardiovascular: Negative for chest pain, palpitations and leg swelling.   Gastrointestinal: Negative for abdominal pain, constipation, diarrhea, nausea and vomiting.   Genitourinary: Negative for dysuria, frequency and urgency.   Musculoskeletal: Negative for neck pain.   Skin: Negative for rash.   Neurological: Negative for dizziness, weakness and headaches.   Psychiatric/Behavioral:        Patient denies  "any feelings of depression and has not felt down, hopeless or lost interest in any activities.   All other systems reviewed and are negative.  Review of systems has been reviewed and validated on 06/20/2022 and updated with any changes.      Objective   Vitals:    06/20/22 1111   BP: 138/70   BP Location: Left arm   Patient Position: Sitting   Cuff Size: Adult   Pulse: 82  Comment: regular   Temp: 98.1 °F (36.7 °C)   TempSrc: Temporal   SpO2: 97%   Weight: 67.6 kg (149 lb)   Height: 177.8 cm (70\")   PainSc: 0-No pain      Body mass index is 21.38 kg/m².    Physical Exam   Constitutional: He is oriented to person, place, and time. He appears well-developed. No distress.   HENT:   Head: Normocephalic and atraumatic.   Eyes: Pupils are equal, round, and reactive to light. Conjunctivae are normal. No scleral icterus.   Cardiovascular: Normal rate, regular rhythm and normal heart sounds. Exam reveals no gallop and no friction rub.   No murmur heard.  Pulmonary/Chest: Effort normal and breath sounds normal. No respiratory distress. He has no wheezes. He has no rales.   Musculoskeletal: Normal range of motion.   Lymphadenopathy:     He has no cervical adenopathy.   Neurological: He is alert and oriented to person, place, and time. No cranial nerve deficit.   Skin: Skin is warm and dry. No rash noted.   Psychiatric: His behavior is normal. Judgment and thought content normal.   Nursing note and vitals reviewed.  Physical exam has been reviewed and validated on 06/20/2022 and updated with any changes.      Assessment & Plan   Diagnoses and all orders for this visit:    1. Type 2 diabetes mellitus without complication, without long-term current use of insulin (HCC) (Primary)  -     Hemoglobin A1c; Future  -     metFORMIN ER (GLUCOPHAGE-XR) 500 MG 24 hr tablet; Take 2 tablets by mouth Every Evening.  Dispense: 180 tablet; Refill: 1    2. Essential hypertension  -     CBC & Differential; Future  -     Comprehensive Metabolic " Panel; Future  -     T4, Free; Future  -     TSH; Future  -     Urinalysis With Culture If Indicated -; Future  -     lisinopril (PRINIVIL,ZESTRIL) 10 MG tablet; Take 1 tablet by mouth Daily.  Dispense: 90 tablet; Refill: 1    3. Mixed hyperlipidemia  -     LDL Cholesterol, Direct; Future  -     rosuvastatin (CRESTOR) 10 MG tablet; Take 1 tablet by mouth Every Night.  Dispense: 90 tablet; Refill: 1    4. Gastroesophageal reflux disease without esophagitis  -     pantoprazole (PROTONIX) 40 MG EC tablet; Take 1 tablet by mouth Daily.  Dispense: 90 tablet; Refill: 1    5. Primary osteoarthritis involving multiple joints         Labs are reviewed with patient.  His glucose is 121, and his hemoglobin A1c is 6.90. He will continue with his current diabetic medications.  He may monitor blood sugar 1 time daily.  His LDL cholesterol is 90.  He will continue with Crestor 10 mg daily for treatment of hyperlipidemia.  His blood pressure is controlled, and he will continue with his current blood pressure medication.  He will continue with Protonix 40 mg daily for treatment of GERD.  He will continue to use an over-the-counter NSAID as needed for his osteoarthritis.  His B12 level is normal at 584.  His hemoglobin and hematocrit are also normal at 14.0 and 43.6.  I informed him that he does not have any type of anemia or B12 deficiency.  He is getting adequate B12 in his diet.      PHQ-2/PHQ-9 Depression Screening 6/20/2022   Retired PHQ-9 Total Score -   Retired Total Score -   Little Interest or Pleasure in Doing Things 0-->not at all   Feeling Down, Depressed or Hopeless 0-->not at all   PHQ-9: Brief Depression Severity Measure Score 0         Lab on 06/14/2022   Component Date Value Ref Range Status   • Glucose 06/14/2022 121 (A) 70 - 99 mg/dL Final   • BUN 06/14/2022 16  7 - 23 mg/dL Final   • Creatinine 06/14/2022 0.77  0.70 - 1.30 mg/dL Final   • Sodium 06/14/2022 138  137 - 145 mmol/L Final   • Potassium 06/14/2022 4.0   3.4 - 5.0 mmol/L Final   • Chloride 06/14/2022 101  101 - 112 mmol/L Final   • CO2 06/14/2022 30.0  22.0 - 30.0 mmol/L Final   • Calcium 06/14/2022 8.9  8.4 - 10.2 mg/dL Final   • Total Protein 06/14/2022 6.8  6.3 - 8.6 g/dL Final   • Albumin 06/14/2022 4.10  3.50 - 5.00 g/dL Final   • ALT (SGPT) 06/14/2022 21  <=50 U/L Final   • AST (SGOT) 06/14/2022 24  17 - 59 U/L Final   • Alkaline Phosphatase 06/14/2022 54  38 - 126 U/L Final   • Total Bilirubin 06/14/2022 0.9  0.2 - 1.3 mg/dL Final   • Globulin 06/14/2022 2.7  2.3 - 3.5 gm/dL Final   • A/G Ratio 06/14/2022 1.5  1.1 - 1.8 g/dL Final   • BUN/Creatinine Ratio 06/14/2022 20.8  7.0 - 25.0 Final   • Anion Gap 06/14/2022 7.0  5.0 - 15.0 mmol/L Final   • eGFR 06/14/2022 94.5  >60.0 mL/min/1.73 Final    National Kidney Foundation and American Society of Nephrology (ASN) Task Force recommended calculation based on the Chronic Kidney Disease Epidemiology Collaboration (CKD-EPI) equation refit without adjustment for race.   • Hemoglobin A1C 06/14/2022 6.90 (A) 4.80 - 5.60 % Final   • LDL Cholesterol  06/14/2022 90  0 - 100 mg/dL Final   • Microalbumin/Creatinine Ratio 06/14/2022    Final    Unable to calculate   • Creatinine, Urine 06/14/2022 61.6  mg/dL Final   • Microalbumin, Urine 06/14/2022 <1.2  mg/dL Final   • Vitamin B-12 06/14/2022 584  211 - 946 pg/mL Final   • WBC 06/14/2022 6.48  3.40 - 10.80 10*3/mm3 Final   • RBC 06/14/2022 4.79  4.14 - 5.80 10*6/mm3 Final   • Hemoglobin 06/14/2022 14.0  13.0 - 17.7 g/dL Final   • Hematocrit 06/14/2022 43.6  37.5 - 51.0 % Final   • MCV 06/14/2022 91.0  79.0 - 97.0 fL Final   • MCH 06/14/2022 29.2  26.6 - 33.0 pg Final   • MCHC 06/14/2022 32.1  31.5 - 35.7 g/dL Final   • RDW 06/14/2022 12.8  12.3 - 15.4 % Final   • RDW-SD 06/14/2022 41.8  37.0 - 54.0 fl Final   • MPV 06/14/2022 8.7  6.0 - 12.0 fL Final   • Platelets 06/14/2022 225  140 - 450 10*3/mm3 Final   • Neutrophil % 06/14/2022 43.4  42.7 - 76.0 % Final   • Lymphocyte %  06/14/2022 41.8  19.6 - 45.3 % Final   • Monocyte % 06/14/2022 8.8  5.0 - 12.0 % Final   • Eosinophil % 06/14/2022 5.7  0.3 - 6.2 % Final   • Basophil % 06/14/2022 0.3  0.0 - 1.5 % Final   • Neutrophils, Absolute 06/14/2022 2.81  1.70 - 7.00 10*3/mm3 Final   • Lymphocytes, Absolute 06/14/2022 2.71  0.70 - 3.10 10*3/mm3 Final   • Monocytes, Absolute 06/14/2022 0.57  0.10 - 0.90 10*3/mm3 Final   • Eosinophils, Absolute 06/14/2022 0.37  0.00 - 0.40 10*3/mm3 Final   • Basophils, Absolute 06/14/2022 0.02  0.00 - 0.20 10*3/mm3 Final   ]        .  .  .  .

## 2022-06-20 ENCOUNTER — OFFICE VISIT (OUTPATIENT)
Dept: FAMILY MEDICINE CLINIC | Facility: CLINIC | Age: 72
End: 2022-06-20

## 2022-06-20 VITALS
TEMPERATURE: 98.1 F | WEIGHT: 149 LBS | HEIGHT: 70 IN | SYSTOLIC BLOOD PRESSURE: 138 MMHG | DIASTOLIC BLOOD PRESSURE: 70 MMHG | HEART RATE: 82 BPM | BODY MASS INDEX: 21.33 KG/M2 | OXYGEN SATURATION: 97 %

## 2022-06-20 DIAGNOSIS — K21.9 GASTROESOPHAGEAL REFLUX DISEASE WITHOUT ESOPHAGITIS: Chronic | ICD-10-CM

## 2022-06-20 DIAGNOSIS — E11.9 TYPE 2 DIABETES MELLITUS WITHOUT COMPLICATION, WITHOUT LONG-TERM CURRENT USE OF INSULIN: Primary | Chronic | ICD-10-CM

## 2022-06-20 DIAGNOSIS — I10 ESSENTIAL HYPERTENSION: Chronic | ICD-10-CM

## 2022-06-20 DIAGNOSIS — E78.2 MIXED HYPERLIPIDEMIA: Chronic | ICD-10-CM

## 2022-06-20 DIAGNOSIS — M15.9 PRIMARY OSTEOARTHRITIS INVOLVING MULTIPLE JOINTS: Chronic | ICD-10-CM

## 2022-06-20 PROCEDURE — 99214 OFFICE O/P EST MOD 30 MIN: CPT | Performed by: INTERNAL MEDICINE

## 2022-06-20 RX ORDER — LISINOPRIL 10 MG/1
10 TABLET ORAL DAILY
Qty: 90 TABLET | Refills: 1 | Status: SHIPPED | OUTPATIENT
Start: 2022-06-20 | End: 2022-12-28 | Stop reason: SDUPTHER

## 2022-06-20 RX ORDER — PANTOPRAZOLE SODIUM 40 MG/1
40 TABLET, DELAYED RELEASE ORAL DAILY
Qty: 90 TABLET | Refills: 1 | Status: SHIPPED | OUTPATIENT
Start: 2022-06-20 | End: 2022-12-28 | Stop reason: SDUPTHER

## 2022-06-20 RX ORDER — ROSUVASTATIN CALCIUM 10 MG/1
10 TABLET, COATED ORAL NIGHTLY
Qty: 90 TABLET | Refills: 1 | Status: SHIPPED | OUTPATIENT
Start: 2022-06-20 | End: 2022-12-28 | Stop reason: SDUPTHER

## 2022-06-20 RX ORDER — METFORMIN HYDROCHLORIDE 500 MG/1
1000 TABLET, EXTENDED RELEASE ORAL EVERY EVENING
Qty: 180 TABLET | Refills: 1 | Status: SHIPPED | OUTPATIENT
Start: 2022-06-20 | End: 2022-12-28 | Stop reason: SDUPTHER

## 2022-09-20 DIAGNOSIS — K21.9 GASTROESOPHAGEAL REFLUX DISEASE WITHOUT ESOPHAGITIS: Chronic | ICD-10-CM

## 2022-09-20 DIAGNOSIS — E11.9 TYPE 2 DIABETES MELLITUS WITHOUT COMPLICATION, WITHOUT LONG-TERM CURRENT USE OF INSULIN: Chronic | ICD-10-CM

## 2022-09-22 NOTE — TELEPHONE ENCOUNTER
I tried calling the patient to see whom he has est care with or if he needs help setting up an appt. I did not leave a message. I'll try calling back later.

## 2022-09-30 RX ORDER — PANTOPRAZOLE SODIUM 40 MG/1
40 TABLET, DELAYED RELEASE ORAL DAILY
Qty: 90 TABLET | Refills: 1 | Status: CANCELLED | OUTPATIENT
Start: 2022-09-30

## 2022-09-30 RX ORDER — METFORMIN HYDROCHLORIDE 500 MG/1
1000 TABLET, EXTENDED RELEASE ORAL EVERY EVENING
Qty: 180 TABLET | Refills: 1 | OUTPATIENT
Start: 2022-09-30

## 2022-09-30 RX ORDER — METFORMIN HYDROCHLORIDE 500 MG/1
1000 TABLET, EXTENDED RELEASE ORAL EVERY EVENING
Qty: 180 TABLET | Refills: 1 | Status: CANCELLED | OUTPATIENT
Start: 2022-09-30

## 2022-09-30 RX ORDER — PANTOPRAZOLE SODIUM 40 MG/1
40 TABLET, DELAYED RELEASE ORAL DAILY
Qty: 90 TABLET | Refills: 1 | OUTPATIENT
Start: 2022-09-30

## 2022-11-23 DIAGNOSIS — I10 ESSENTIAL HYPERTENSION: Chronic | ICD-10-CM

## 2022-11-23 RX ORDER — LISINOPRIL 10 MG/1
10 TABLET ORAL DAILY
Qty: 90 TABLET | Refills: 1 | OUTPATIENT
Start: 2022-11-23

## 2022-12-13 ENCOUNTER — LAB (OUTPATIENT)
Dept: LAB | Facility: OTHER | Age: 72
End: 2022-12-13

## 2022-12-13 DIAGNOSIS — E78.2 MIXED HYPERLIPIDEMIA: ICD-10-CM

## 2022-12-13 DIAGNOSIS — E11.9 TYPE 2 DIABETES MELLITUS WITHOUT COMPLICATION, WITHOUT LONG-TERM CURRENT USE OF INSULIN: ICD-10-CM

## 2022-12-13 DIAGNOSIS — I10 ESSENTIAL HYPERTENSION: ICD-10-CM

## 2022-12-13 DIAGNOSIS — I10 ESSENTIAL HYPERTENSION: Primary | ICD-10-CM

## 2022-12-13 LAB
ALBUMIN SERPL-MCNC: 4.3 G/DL (ref 3.5–5)
ALBUMIN/GLOB SERPL: 1.6 G/DL (ref 1.1–1.8)
ALP SERPL-CCNC: 66 U/L (ref 38–126)
ALT SERPL W P-5'-P-CCNC: 23 U/L
ANION GAP SERPL CALCULATED.3IONS-SCNC: 9 MMOL/L (ref 5–15)
AST SERPL-CCNC: 23 U/L (ref 17–59)
BASOPHILS # BLD AUTO: 0.02 10*3/MM3 (ref 0–0.2)
BASOPHILS NFR BLD AUTO: 0.3 % (ref 0–1.5)
BILIRUB SERPL-MCNC: 0.7 MG/DL (ref 0.2–1.3)
BILIRUB UR QL STRIP: NEGATIVE
BUN SERPL-MCNC: 16 MG/DL (ref 7–23)
BUN/CREAT SERPL: 19.8 (ref 7–25)
CALCIUM SPEC-SCNC: 8.7 MG/DL (ref 8.4–10.2)
CHLORIDE SERPL-SCNC: 100 MMOL/L (ref 101–112)
CHOLEST SERPL-MCNC: 166 MG/DL (ref 150–200)
CLARITY UR: CLEAR
CO2 SERPL-SCNC: 29 MMOL/L (ref 22–30)
COLOR UR: YELLOW
CREAT SERPL-MCNC: 0.81 MG/DL (ref 0.7–1.3)
DEPRECATED RDW RBC AUTO: 41.9 FL (ref 37–54)
EGFRCR SERPLBLD CKD-EPI 2021: 93.1 ML/MIN/1.73
EOSINOPHIL # BLD AUTO: 0.32 10*3/MM3 (ref 0–0.4)
EOSINOPHIL NFR BLD AUTO: 4.6 % (ref 0.3–6.2)
ERYTHROCYTE [DISTWIDTH] IN BLOOD BY AUTOMATED COUNT: 12.5 % (ref 12.3–15.4)
GLOBULIN UR ELPH-MCNC: 2.7 GM/DL (ref 2.3–3.5)
GLUCOSE SERPL-MCNC: 127 MG/DL (ref 70–99)
GLUCOSE UR STRIP-MCNC: NEGATIVE MG/DL
HCT VFR BLD AUTO: 46.6 % (ref 37.5–51)
HDLC SERPL-MCNC: 45 MG/DL (ref 40–59)
HGB BLD-MCNC: 15.1 G/DL (ref 13–17.7)
HGB UR QL STRIP.AUTO: NEGATIVE
KETONES UR QL STRIP: NEGATIVE
LDLC SERPL CALC-MCNC: 105 MG/DL
LDLC/HDLC SERPL: 2.32 {RATIO} (ref 0–3.55)
LEUKOCYTE ESTERASE UR QL STRIP.AUTO: NEGATIVE
LYMPHOCYTES # BLD AUTO: 2.72 10*3/MM3 (ref 0.7–3.1)
LYMPHOCYTES NFR BLD AUTO: 39.2 % (ref 19.6–45.3)
MCH RBC QN AUTO: 30.2 PG (ref 26.6–33)
MCHC RBC AUTO-ENTMCNC: 32.4 G/DL (ref 31.5–35.7)
MCV RBC AUTO: 93.2 FL (ref 79–97)
MONOCYTES # BLD AUTO: 0.57 10*3/MM3 (ref 0.1–0.9)
MONOCYTES NFR BLD AUTO: 8.2 % (ref 5–12)
NEUTROPHILS NFR BLD AUTO: 3.31 10*3/MM3 (ref 1.7–7)
NEUTROPHILS NFR BLD AUTO: 47.7 % (ref 42.7–76)
NITRITE UR QL STRIP: NEGATIVE
PH UR STRIP.AUTO: 7 [PH] (ref 5.5–8)
PLATELET # BLD AUTO: 218 10*3/MM3 (ref 140–450)
PMV BLD AUTO: 9 FL (ref 6–12)
POTASSIUM SERPL-SCNC: 4.1 MMOL/L (ref 3.4–5)
PROT SERPL-MCNC: 7 G/DL (ref 6.3–8.6)
PROT UR QL STRIP: NEGATIVE
RBC # BLD AUTO: 5 10*6/MM3 (ref 4.14–5.8)
SODIUM SERPL-SCNC: 138 MMOL/L (ref 137–145)
SP GR UR STRIP: 1.02 (ref 1–1.03)
TRIGL SERPL-MCNC: 83 MG/DL
UROBILINOGEN UR QL STRIP: NORMAL
VLDLC SERPL-MCNC: 16 MG/DL (ref 5–40)
WBC NRBC COR # BLD: 6.94 10*3/MM3 (ref 3.4–10.8)

## 2022-12-13 PROCEDURE — 84439 ASSAY OF FREE THYROXINE: CPT | Performed by: NURSE PRACTITIONER

## 2022-12-13 PROCEDURE — 80061 LIPID PANEL: CPT | Performed by: NURSE PRACTITIONER

## 2022-12-13 PROCEDURE — 84443 ASSAY THYROID STIM HORMONE: CPT | Performed by: NURSE PRACTITIONER

## 2022-12-13 PROCEDURE — 85025 COMPLETE CBC W/AUTO DIFF WBC: CPT | Performed by: NURSE PRACTITIONER

## 2022-12-13 PROCEDURE — 83036 HEMOGLOBIN GLYCOSYLATED A1C: CPT | Performed by: NURSE PRACTITIONER

## 2022-12-13 PROCEDURE — 36415 COLL VENOUS BLD VENIPUNCTURE: CPT | Performed by: NURSE PRACTITIONER

## 2022-12-13 PROCEDURE — 80053 COMPREHEN METABOLIC PANEL: CPT | Performed by: NURSE PRACTITIONER

## 2022-12-13 PROCEDURE — 81003 URINALYSIS AUTO W/O SCOPE: CPT | Performed by: NURSE PRACTITIONER

## 2022-12-14 LAB
HBA1C MFR BLD: 6.6 % (ref 4.8–5.6)
T4 FREE SERPL-MCNC: 1.52 NG/DL (ref 0.93–1.7)
TSH SERPL DL<=0.05 MIU/L-ACNC: 1.2 UIU/ML (ref 0.27–4.2)

## 2022-12-28 ENCOUNTER — OFFICE VISIT (OUTPATIENT)
Dept: FAMILY MEDICINE CLINIC | Facility: CLINIC | Age: 72
End: 2022-12-28
Payer: MEDICARE

## 2022-12-28 VITALS
BODY MASS INDEX: 21.05 KG/M2 | TEMPERATURE: 97.5 F | WEIGHT: 147 LBS | HEIGHT: 70 IN | SYSTOLIC BLOOD PRESSURE: 120 MMHG | HEART RATE: 86 BPM | OXYGEN SATURATION: 99 % | DIASTOLIC BLOOD PRESSURE: 68 MMHG

## 2022-12-28 DIAGNOSIS — Z12.5 SCREENING FOR PROSTATE CANCER: Primary | ICD-10-CM

## 2022-12-28 DIAGNOSIS — E11.9 TYPE 2 DIABETES MELLITUS WITHOUT COMPLICATION, WITHOUT LONG-TERM CURRENT USE OF INSULIN: Chronic | ICD-10-CM

## 2022-12-28 DIAGNOSIS — I10 ESSENTIAL HYPERTENSION: Chronic | ICD-10-CM

## 2022-12-28 DIAGNOSIS — K21.9 GASTROESOPHAGEAL REFLUX DISEASE WITHOUT ESOPHAGITIS: Chronic | ICD-10-CM

## 2022-12-28 DIAGNOSIS — E78.2 MIXED HYPERLIPIDEMIA: Chronic | ICD-10-CM

## 2022-12-28 PROCEDURE — 99213 OFFICE O/P EST LOW 20 MIN: CPT | Performed by: NURSE PRACTITIONER

## 2022-12-28 RX ORDER — LISINOPRIL 10 MG/1
10 TABLET ORAL DAILY
Qty: 90 TABLET | Refills: 1 | Status: SHIPPED | OUTPATIENT
Start: 2022-12-28

## 2022-12-28 RX ORDER — ROSUVASTATIN CALCIUM 10 MG/1
10 TABLET, COATED ORAL NIGHTLY
Qty: 90 TABLET | Refills: 1 | Status: SHIPPED | OUTPATIENT
Start: 2022-12-28

## 2022-12-28 RX ORDER — PANTOPRAZOLE SODIUM 40 MG/1
40 TABLET, DELAYED RELEASE ORAL DAILY
Qty: 90 TABLET | Refills: 1 | Status: SHIPPED | OUTPATIENT
Start: 2022-12-28 | End: 2023-03-27

## 2022-12-28 RX ORDER — METFORMIN HYDROCHLORIDE 500 MG/1
1000 TABLET, EXTENDED RELEASE ORAL EVERY EVENING
Qty: 180 TABLET | Refills: 1 | Status: SHIPPED | OUTPATIENT
Start: 2022-12-28 | End: 2023-03-27

## 2022-12-28 NOTE — PROGRESS NOTES
Subjective   Ronald Irizarry is a 72 y.o. male who presents to the office for HTN, hyperlipidemia, Type 2 DM and GERD follow-up and review of labs.  Currently denies any issues going on.  Tests his glucose once daily.  A1C is running 6.6.  Lipid panel is stable.    History of Present Illness     The following portions of the patient's history were reviewed and updated as appropriate: allergies, current medications, past family history, past medical history, past social history, past surgical history and problem list.    Review of Systems   Constitutional: Negative for chills, fatigue and fever.   HENT: Negative for congestion, sneezing, sore throat and trouble swallowing.    Eyes: Negative for visual disturbance.   Respiratory: Negative for cough, chest tightness, shortness of breath and wheezing.    Cardiovascular: Negative for chest pain, palpitations and leg swelling.   Gastrointestinal: Negative for abdominal pain, constipation, diarrhea, nausea and vomiting.   Genitourinary: Negative for dysuria, frequency and urgency.   Musculoskeletal: Negative for neck pain.   Skin: Negative for rash.   Neurological: Negative for dizziness, weakness and headaches.   Psychiatric/Behavioral:        In the past two weeks the pt has not felt down, depressed, hopeless or lost interest in doing things   All other systems reviewed and are negative.      Objective   Physical Exam  Vitals and nursing note reviewed.   Constitutional:       Appearance: He is well-developed.   HENT:      Head: Normocephalic and atraumatic.      Right Ear: External ear normal.      Left Ear: External ear normal.      Nose: Nose normal.   Eyes:      General: No scleral icterus.        Right eye: No discharge.         Left eye: No discharge.      Conjunctiva/sclera: Conjunctivae normal.      Pupils: Pupils are equal, round, and reactive to light.   Neck:      Thyroid: No thyromegaly.   Cardiovascular:      Rate and Rhythm: Normal rate and regular rhythm.       Heart sounds: Normal heart sounds. No murmur heard.    No friction rub. No gallop.   Pulmonary:      Effort: Pulmonary effort is normal. No respiratory distress.      Breath sounds: Normal breath sounds. No wheezing or rales.   Abdominal:      General: Bowel sounds are normal. There is no distension.      Palpations: Abdomen is soft.      Tenderness: There is no abdominal tenderness. There is no guarding or rebound.   Musculoskeletal:         General: Normal range of motion.      Cervical back: Normal range of motion and neck supple.   Lymphadenopathy:      Cervical: No cervical adenopathy.   Skin:     General: Skin is warm and dry.      Findings: No rash.   Neurological:      Mental Status: He is alert and oriented to person, place, and time.      Cranial Nerves: No cranial nerve deficit.   Psychiatric:         Behavior: Behavior normal.         Thought Content: Thought content normal.         Judgment: Judgment normal.         Assessment & Plan   Diagnoses and all orders for this visit:    1. Screening for prostate cancer (Primary)  -     PSA Screen; Future    2. Essential hypertension  -     lisinopril (PRINIVIL,ZESTRIL) 10 MG tablet; Take 1 tablet by mouth Daily.  Dispense: 90 tablet; Refill: 1  -     CBC & Differential; Future  -     Comprehensive Metabolic Panel; Future  -     T4, Free; Future  -     TSH; Future  -     Urinalysis With Culture If Indicated -; Future  -     Lipid Panel; Future  -     Microalbumin / Creatinine Urine Ratio - Urine, Clean Catch; Future    3. Type 2 diabetes mellitus without complication, without long-term current use of insulin (HCC)  -     metFORMIN ER (GLUCOPHAGE-XR) 500 MG 24 hr tablet; Take 2 tablets by mouth Every Evening.  Dispense: 180 tablet; Refill: 1  -     Comprehensive Metabolic Panel; Future  -     Hemoglobin A1c; Future    4. Gastroesophageal reflux disease without esophagitis  -     pantoprazole (PROTONIX) 40 MG EC tablet; Take 1 tablet by mouth Daily.   Dispense: 90 tablet; Refill: 1    5. Mixed hyperlipidemia  -     rosuvastatin (CRESTOR) 10 MG tablet; Take 1 tablet by mouth Every Night.  Dispense: 90 tablet; Refill: 1  -     Lipid Panel; Future    Fasting labs are entered for next six months.  Want him to continue on with ADA cardiac diet and activity level.  He has lost two pounds over the last six months.       Labs are reviewed with patient.      Lab on 12/13/2022   Component Date Value Ref Range Status   • Glucose 12/13/2022 127 (H)  70 - 99 mg/dL Final   • BUN 12/13/2022 16  7 - 23 mg/dL Final   • Creatinine 12/13/2022 0.81  0.70 - 1.30 mg/dL Final   • Sodium 12/13/2022 138  137 - 145 mmol/L Final   • Potassium 12/13/2022 4.1  3.4 - 5.0 mmol/L Final   • Chloride 12/13/2022 100 (L)  101 - 112 mmol/L Final   • CO2 12/13/2022 29.0  22.0 - 30.0 mmol/L Final   • Calcium 12/13/2022 8.7  8.4 - 10.2 mg/dL Final   • Total Protein 12/13/2022 7.0  6.3 - 8.6 g/dL Final   • Albumin 12/13/2022 4.30  3.50 - 5.00 g/dL Final   • ALT (SGPT) 12/13/2022 23  <=50 U/L Final   • AST (SGOT) 12/13/2022 23  17 - 59 U/L Final   • Alkaline Phosphatase 12/13/2022 66  38 - 126 U/L Final   • Total Bilirubin 12/13/2022 0.7  0.2 - 1.3 mg/dL Final   • Globulin 12/13/2022 2.7  2.3 - 3.5 gm/dL Final   • A/G Ratio 12/13/2022 1.6  1.1 - 1.8 g/dL Final   • BUN/Creatinine Ratio 12/13/2022 19.8  7.0 - 25.0 Final   • Anion Gap 12/13/2022 9.0  5.0 - 15.0 mmol/L Final   • eGFR 12/13/2022 93.1  >60.0 mL/min/1.73 Final    National Kidney Foundation and American Society of Nephrology (ASN) Task Force recommended calculation based on the Chronic Kidney Disease Epidemiology Collaboration (CKD-EPI) equation refit without adjustment for race.   • Total Cholesterol 12/13/2022 166  150 - 200 mg/dL Final   • Triglycerides 12/13/2022 83  <=150 mg/dL Final   • HDL Cholesterol 12/13/2022 45  40 - 59 mg/dL Final   • LDL Cholesterol  12/13/2022 105 (H)  <=100 mg/dL Final   • VLDL Cholesterol 12/13/2022 16  5 - 40  mg/dL Final   • LDL/HDL Ratio 12/13/2022 2.32  0.00 - 3.55 Final   • TSH 12/13/2022 1.200  0.270 - 4.200 uIU/mL Final   • Free T4 12/13/2022 1.52  0.93 - 1.70 ng/dL Final   • Color, UA 12/13/2022 Yellow  Yellow, Straw Final   • Appearance, UA 12/13/2022 Clear  Clear Final   • pH, UA 12/13/2022 7.0  5.5 - 8.0 Final   • Specific Gravity, UA 12/13/2022 1.020  1.005 - 1.030 Final   • Glucose, UA 12/13/2022 Negative  Negative Final   • Ketones, UA 12/13/2022 Negative  Negative Final   • Bilirubin, UA 12/13/2022 Negative  Negative Final   • Blood, UA 12/13/2022 Negative  Negative Final   • Protein, UA 12/13/2022 Negative  Negative Final   • Leuk Esterase, UA 12/13/2022 Negative  Negative Final   • Nitrite, UA 12/13/2022 Negative  Negative Final   • Urobilinogen, UA 12/13/2022 0.2 E.U./dL  0.2 - 1.0 E.U./dL Final   • Hemoglobin A1C 12/13/2022 6.60 (H)  4.80 - 5.60 % Final   • WBC 12/13/2022 6.94  3.40 - 10.80 10*3/mm3 Final   • RBC 12/13/2022 5.00  4.14 - 5.80 10*6/mm3 Final   • Hemoglobin 12/13/2022 15.1  13.0 - 17.7 g/dL Final   • Hematocrit 12/13/2022 46.6  37.5 - 51.0 % Final   • MCV 12/13/2022 93.2  79.0 - 97.0 fL Final   • MCH 12/13/2022 30.2  26.6 - 33.0 pg Final   • MCHC 12/13/2022 32.4  31.5 - 35.7 g/dL Final   • RDW 12/13/2022 12.5  12.3 - 15.4 % Final   • RDW-SD 12/13/2022 41.9  37.0 - 54.0 fl Final   • MPV 12/13/2022 9.0  6.0 - 12.0 fL Final   • Platelets 12/13/2022 218  140 - 450 10*3/mm3 Final   • Neutrophil % 12/13/2022 47.7  42.7 - 76.0 % Final   • Lymphocyte % 12/13/2022 39.2  19.6 - 45.3 % Final   • Monocyte % 12/13/2022 8.2  5.0 - 12.0 % Final   • Eosinophil % 12/13/2022 4.6  0.3 - 6.2 % Final   • Basophil % 12/13/2022 0.3  0.0 - 1.5 % Final   • Neutrophils, Absolute 12/13/2022 3.31  1.70 - 7.00 10*3/mm3 Final   • Lymphocytes, Absolute 12/13/2022 2.72  0.70 - 3.10 10*3/mm3 Final   • Monocytes, Absolute 12/13/2022 0.57  0.10 - 0.90 10*3/mm3 Final   • Eosinophils, Absolute 12/13/2022 0.32  0.00 - 0.40  10*3/mm3 Final   • Basophils, Absolute 12/13/2022 0.02  0.00 - 0.20 10*3/mm3 Final   ]          This document has been electronically signed by ANNAMARIE Timmons on January 10, 2023 07:32 CST

## 2023-02-23 DIAGNOSIS — E11.9 TYPE 2 DIABETES MELLITUS WITHOUT COMPLICATION, WITHOUT LONG-TERM CURRENT USE OF INSULIN: ICD-10-CM

## 2023-02-23 DIAGNOSIS — E78.2 MIXED HYPERLIPIDEMIA: Chronic | ICD-10-CM

## 2023-02-27 DIAGNOSIS — E11.9 TYPE 2 DIABETES MELLITUS WITHOUT COMPLICATION, WITHOUT LONG-TERM CURRENT USE OF INSULIN: ICD-10-CM

## 2023-02-27 RX ORDER — GLUCOSAM/CHON-MSM1/C/MANG/BOSW 500-416.6
TABLET ORAL
Qty: 100 EACH | Refills: 3 | OUTPATIENT
Start: 2023-02-27

## 2023-02-27 RX ORDER — ROSUVASTATIN CALCIUM 10 MG/1
10 TABLET, COATED ORAL NIGHTLY
Qty: 90 TABLET | Refills: 1 | OUTPATIENT
Start: 2023-02-27

## 2023-02-27 RX ORDER — LANCETS 30 GAUGE
EACH MISCELLANEOUS
Qty: 100 EACH | Refills: 3 | Status: SHIPPED | OUTPATIENT
Start: 2023-02-27

## 2023-02-27 RX ORDER — CALCIUM CITRATE/VITAMIN D3 200MG-6.25
TABLET ORAL
Refills: 3 | OUTPATIENT
Start: 2023-02-27

## 2023-02-27 NOTE — TELEPHONE ENCOUNTER
Refill requests were sent to Dr Manriquez. The patient's new PCP is ANNAMARIE Timmons. I denied the requests and sent them under the new PCP.    Refill for crestor was sent to [facility] and is not yet due for refill.

## 2023-03-25 DIAGNOSIS — K21.9 GASTROESOPHAGEAL REFLUX DISEASE WITHOUT ESOPHAGITIS: Chronic | ICD-10-CM

## 2023-03-25 DIAGNOSIS — E11.9 TYPE 2 DIABETES MELLITUS WITHOUT COMPLICATION, WITHOUT LONG-TERM CURRENT USE OF INSULIN: Chronic | ICD-10-CM

## 2023-03-27 RX ORDER — PANTOPRAZOLE SODIUM 40 MG/1
40 TABLET, DELAYED RELEASE ORAL DAILY
Qty: 90 TABLET | Refills: 0 | Status: SHIPPED | OUTPATIENT
Start: 2023-03-27

## 2023-03-27 RX ORDER — METFORMIN HYDROCHLORIDE 500 MG/1
1000 TABLET, EXTENDED RELEASE ORAL EVERY EVENING
Qty: 180 TABLET | Refills: 0 | Status: SHIPPED | OUTPATIENT
Start: 2023-03-27

## 2023-04-27 ENCOUNTER — LAB (OUTPATIENT)
Dept: LAB | Facility: OTHER | Age: 73
End: 2023-04-27
Payer: MEDICARE

## 2023-04-27 DIAGNOSIS — E11.9 TYPE 2 DIABETES MELLITUS WITHOUT COMPLICATION, WITHOUT LONG-TERM CURRENT USE OF INSULIN: Chronic | ICD-10-CM

## 2023-04-27 DIAGNOSIS — E78.2 MIXED HYPERLIPIDEMIA: Chronic | ICD-10-CM

## 2023-04-27 DIAGNOSIS — Z12.5 SCREENING FOR PROSTATE CANCER: ICD-10-CM

## 2023-04-27 DIAGNOSIS — I10 ESSENTIAL HYPERTENSION: ICD-10-CM

## 2023-04-27 LAB
ALBUMIN SERPL-MCNC: 4.2 G/DL (ref 3.5–5)
ALBUMIN/GLOB SERPL: 1.4 G/DL (ref 1.1–1.8)
ALP SERPL-CCNC: 70 U/L (ref 38–126)
ALT SERPL W P-5'-P-CCNC: 20 U/L
ANION GAP SERPL CALCULATED.3IONS-SCNC: 7 MMOL/L (ref 5–15)
AST SERPL-CCNC: 27 U/L (ref 17–59)
BASOPHILS # BLD AUTO: 0.02 10*3/MM3 (ref 0–0.2)
BASOPHILS NFR BLD AUTO: 0.4 % (ref 0–1.5)
BILIRUB SERPL-MCNC: 0.9 MG/DL (ref 0.2–1.3)
BILIRUB UR QL STRIP: NEGATIVE
BUN SERPL-MCNC: 16 MG/DL (ref 7–23)
BUN/CREAT SERPL: 20 (ref 7–25)
CALCIUM SPEC-SCNC: 9.1 MG/DL (ref 8.4–10.2)
CHLORIDE SERPL-SCNC: 99 MMOL/L (ref 101–112)
CHOLEST SERPL-MCNC: 147 MG/DL (ref 150–200)
CLARITY UR: CLEAR
CO2 SERPL-SCNC: 28 MMOL/L (ref 22–30)
COLOR UR: YELLOW
CREAT SERPL-MCNC: 0.8 MG/DL (ref 0.7–1.3)
DEPRECATED RDW RBC AUTO: 42.5 FL (ref 37–54)
EGFRCR SERPLBLD CKD-EPI 2021: 92.9 ML/MIN/1.73
EOSINOPHIL # BLD AUTO: 0.35 10*3/MM3 (ref 0–0.4)
EOSINOPHIL NFR BLD AUTO: 6.3 % (ref 0.3–6.2)
ERYTHROCYTE [DISTWIDTH] IN BLOOD BY AUTOMATED COUNT: 12.8 % (ref 12.3–15.4)
GLOBULIN UR ELPH-MCNC: 2.9 GM/DL (ref 2.3–3.5)
GLUCOSE SERPL-MCNC: 95 MG/DL (ref 70–99)
GLUCOSE UR STRIP-MCNC: NEGATIVE MG/DL
HCT VFR BLD AUTO: 44.7 % (ref 37.5–51)
HDLC SERPL-MCNC: 42 MG/DL (ref 40–59)
HGB BLD-MCNC: 14.3 G/DL (ref 13–17.7)
HGB UR QL STRIP.AUTO: NEGATIVE
KETONES UR QL STRIP: ABNORMAL
LDLC SERPL CALC-MCNC: 92 MG/DL
LDLC/HDLC SERPL: 2.2 {RATIO} (ref 0–3.55)
LEUKOCYTE ESTERASE UR QL STRIP.AUTO: NEGATIVE
LYMPHOCYTES # BLD AUTO: 2.57 10*3/MM3 (ref 0.7–3.1)
LYMPHOCYTES NFR BLD AUTO: 46.2 % (ref 19.6–45.3)
MCH RBC QN AUTO: 29.7 PG (ref 26.6–33)
MCHC RBC AUTO-ENTMCNC: 32 G/DL (ref 31.5–35.7)
MCV RBC AUTO: 92.7 FL (ref 79–97)
MONOCYTES # BLD AUTO: 0.42 10*3/MM3 (ref 0.1–0.9)
MONOCYTES NFR BLD AUTO: 7.6 % (ref 5–12)
NEUTROPHILS NFR BLD AUTO: 2.2 10*3/MM3 (ref 1.7–7)
NEUTROPHILS NFR BLD AUTO: 39.5 % (ref 42.7–76)
NITRITE UR QL STRIP: NEGATIVE
PH UR STRIP.AUTO: 5.5 [PH] (ref 5.5–8)
PLATELET # BLD AUTO: 205 10*3/MM3 (ref 140–450)
PMV BLD AUTO: 9 FL (ref 6–12)
POTASSIUM SERPL-SCNC: 4.1 MMOL/L (ref 3.4–5)
PROT SERPL-MCNC: 7.1 G/DL (ref 6.3–8.6)
PROT UR QL STRIP: NEGATIVE
RBC # BLD AUTO: 4.82 10*6/MM3 (ref 4.14–5.8)
SODIUM SERPL-SCNC: 134 MMOL/L (ref 137–145)
SP GR UR STRIP: 1.01 (ref 1–1.03)
TRIGL SERPL-MCNC: 62 MG/DL
UROBILINOGEN UR QL STRIP: ABNORMAL
VLDLC SERPL-MCNC: 13 MG/DL (ref 5–40)
WBC NRBC COR # BLD: 5.56 10*3/MM3 (ref 3.4–10.8)

## 2023-04-27 PROCEDURE — G0103 PSA SCREENING: HCPCS | Performed by: NURSE PRACTITIONER

## 2023-04-27 PROCEDURE — 80053 COMPREHEN METABOLIC PANEL: CPT | Performed by: NURSE PRACTITIONER

## 2023-04-27 PROCEDURE — 80061 LIPID PANEL: CPT | Performed by: NURSE PRACTITIONER

## 2023-04-27 PROCEDURE — 83036 HEMOGLOBIN GLYCOSYLATED A1C: CPT | Performed by: NURSE PRACTITIONER

## 2023-04-27 PROCEDURE — 84443 ASSAY THYROID STIM HORMONE: CPT | Performed by: NURSE PRACTITIONER

## 2023-04-27 PROCEDURE — 81003 URINALYSIS AUTO W/O SCOPE: CPT | Performed by: NURSE PRACTITIONER

## 2023-04-27 PROCEDURE — 82570 ASSAY OF URINE CREATININE: CPT | Performed by: NURSE PRACTITIONER

## 2023-04-27 PROCEDURE — 82043 UR ALBUMIN QUANTITATIVE: CPT | Performed by: NURSE PRACTITIONER

## 2023-04-27 PROCEDURE — 84439 ASSAY OF FREE THYROXINE: CPT | Performed by: NURSE PRACTITIONER

## 2023-04-27 PROCEDURE — 36415 COLL VENOUS BLD VENIPUNCTURE: CPT | Performed by: NURSE PRACTITIONER

## 2023-04-27 PROCEDURE — 85025 COMPLETE CBC W/AUTO DIFF WBC: CPT | Performed by: NURSE PRACTITIONER

## 2023-04-28 LAB
ALBUMIN UR-MCNC: <1.2 MG/DL
CREAT UR-MCNC: 60 MG/DL
HBA1C MFR BLD: 6.9 % (ref 4.8–5.6)
MICROALBUMIN/CREAT UR: NORMAL MG/G{CREAT}
PSA SERPL-MCNC: 1.76 NG/ML (ref 0–4)
T4 FREE SERPL-MCNC: 1.61 NG/DL (ref 0.93–1.7)
TSH SERPL DL<=0.05 MIU/L-ACNC: 1.27 UIU/ML (ref 0.27–4.2)

## 2023-05-02 ENCOUNTER — OFFICE VISIT (OUTPATIENT)
Dept: FAMILY MEDICINE CLINIC | Facility: CLINIC | Age: 73
End: 2023-05-02
Payer: MEDICARE

## 2023-05-02 VITALS
OXYGEN SATURATION: 95 % | HEART RATE: 76 BPM | BODY MASS INDEX: 21.27 KG/M2 | TEMPERATURE: 96 F | HEIGHT: 70 IN | DIASTOLIC BLOOD PRESSURE: 66 MMHG | WEIGHT: 148.6 LBS | SYSTOLIC BLOOD PRESSURE: 134 MMHG

## 2023-05-02 DIAGNOSIS — Z23 NEED FOR TETANUS BOOSTER: ICD-10-CM

## 2023-05-02 DIAGNOSIS — K21.9 GASTROESOPHAGEAL REFLUX DISEASE WITHOUT ESOPHAGITIS: Chronic | ICD-10-CM

## 2023-05-02 DIAGNOSIS — Z12.11 COLON CANCER SCREENING: ICD-10-CM

## 2023-05-02 DIAGNOSIS — E11.9 TYPE 2 DIABETES MELLITUS WITHOUT COMPLICATION, WITHOUT LONG-TERM CURRENT USE OF INSULIN: Chronic | ICD-10-CM

## 2023-05-02 DIAGNOSIS — Z00.00 ENCOUNTER FOR SUBSEQUENT ANNUAL WELLNESS VISIT (AWV) IN MEDICARE PATIENT: Primary | ICD-10-CM

## 2023-05-02 RX ORDER — PANTOPRAZOLE SODIUM 40 MG/1
40 TABLET, DELAYED RELEASE ORAL DAILY
Qty: 90 TABLET | Refills: 1 | Status: SHIPPED | OUTPATIENT
Start: 2023-05-02

## 2023-05-02 RX ORDER — METFORMIN HYDROCHLORIDE 500 MG/1
1000 TABLET, EXTENDED RELEASE ORAL EVERY EVENING
Qty: 180 TABLET | Refills: 1 | Status: SHIPPED | OUTPATIENT
Start: 2023-05-02

## 2023-05-02 NOTE — PROGRESS NOTES
The ABCs of the Annual Wellness Visit  Subsequent Medicare Wellness Visit    Subjective      Ronald Irizarry is a 73 y.o. male who presents for a Subsequent Medicare Wellness Visit.  Tells me he avoids breads as well as concentrated sweets.  Does eat a baked potato every day.  Takes Tylenol or ibuprofen as needed as pain relievers.  Last dental exam was before COVID  Last eye exam was April 22, does wear glasses.  Reports he has cataracts.  Has hearing issues.    The following portions of the patient's history were reviewed and   updated as appropriate: allergies, current medications, past family history, past medical history, past social history, past surgical history and problem list.    Compared to one year ago, the patient feels his physical   health is the same.    Compared to one year ago, the patient feels his mental   health is the same.    Recent Hospitalizations:  He was not admitted to the hospital during the last year.       Current Medical Providers:  Patient Care Team:  Chloe Desir APRN as PCP - General (Family Medicine)  Higinio Sanders (Optometry)    Outpatient Medications Prior to Visit   Medication Sig Dispense Refill   • Blood Glucose Monitoring Suppl (True Metrix Meter) w/Device kit USE AS DIRECTED TO TEST FINGER STICK BLOOD SUGAR ONCE DAILY     • glucose blood test strip Use as instructed to test FSBS once daily. DX E 11.9 100 each 3   • Lancets misc Use as directed to test FSBS once daily. DX E11.9 100 each 3   • lisinopril (PRINIVIL,ZESTRIL) 10 MG tablet Take 1 tablet by mouth Daily. 90 tablet 1   • Multiple Vitamins-Minerals (CENTRUM SILVER PO) Take 1 tablet by mouth Daily.     • rosuvastatin (CRESTOR) 10 MG tablet Take 1 tablet by mouth Every Night. 90 tablet 1   • Vitamin E 400 UNITS tablet Take 1 tablet by mouth daily.     • metFORMIN ER (GLUCOPHAGE-XR) 500 MG 24 hr tablet TAKE 2 TABLETS BY MOUTH EVERY EVENING 180 tablet 0   • pantoprazole (PROTONIX) 40 MG EC tablet TAKE 1 TABLET BY  "MOUTH DAILY 90 tablet 0     No facility-administered medications prior to visit.       No opioid medication identified on active medication list. I have reviewed chart for other potential  high risk medication/s and harmful drug interactions in the elderly.          Aspirin is not on active medication list.  Aspirin use is contraindicated for this patient due to: GERD.  .    Patient Active Problem List   Diagnosis   • Type 2 diabetes mellitus without complication, without long-term current use of insulin   • Mixed hyperlipidemia   • Essential hypertension   • Primary osteoarthritis involving multiple joints   • Gastroesophageal reflux disease without esophagitis   • No diabetic retinopathy in both eyes     Advance Care Planning   Advance Care Planning     Advance Directive is not on file.  ACP discussion was held with the patient during this visit. Patient does not have an advance directive, declines further assistance.     Objective    Vitals:    05/02/23 1032   BP: 134/66   BP Location: Right arm   Patient Position: Sitting   Cuff Size: Large Adult   Pulse: 76   Temp: 96 °F (35.6 °C)   TempSrc: Tympanic   SpO2: 95%   Weight: 67.4 kg (148 lb 9.6 oz)   Height: 177.8 cm (70\")   PainSc: 0-No pain     Estimated body mass index is 21.32 kg/m² as calculated from the following:    Height as of this encounter: 177.8 cm (70\").    Weight as of this encounter: 67.4 kg (148 lb 9.6 oz).    BMI is within normal parameters. No other follow-up for BMI required.      Does the patient have evidence of cognitive impairment?   No    Lab Results   Component Value Date    TRIG 62 04/27/2023    HDL 42 04/27/2023    LDL 92 04/27/2023    VLDL 13 04/27/2023    HGBA1C 6.90 (H) 04/27/2023          HEALTH RISK ASSESSMENT    Smoking Status:  Social History     Tobacco Use   Smoking Status Never   Smokeless Tobacco Never     Alcohol Consumption:  Social History     Substance and Sexual Activity   Alcohol Use No     Fall Risk Screen:    KRISTIADI " Fall Risk Assessment was completed, and patient is at MODERATE risk for falls. Assessment completed on:2023    Depression Screenin/2/2023    10:28 AM   PHQ-2/PHQ-9 Depression Screening   Little Interest or Pleasure in Doing Things 0-->not at all   Feeling Down, Depressed or Hopeless 0-->not at all   PHQ-9: Brief Depression Severity Measure Score 0       Health Habits and Functional and Cognitive Screenin/2/2023    10:28 AM   Functional & Cognitive Status   Do you have difficulty preparing food and eating? No   Do you have difficulty bathing yourself, getting dressed or grooming yourself? No   Do you have difficulty using the toilet? No   Do you have difficulty moving around from place to place? No   Do you have trouble with steps or getting out of a bed or a chair? No   Current Diet Well Balanced Diet   Dental Exam Not up to date   Eye Exam Not up to date   Exercise (times per week) 5 times per week   Current Exercises Include Yard Work   Do you need help using the phone?  No   Are you deaf or do you have serious difficulty hearing?  No   Do you need help with transportation? No   Do you need help shopping? No   Do you need help preparing meals?  No   Do you need help with housework?  No   Do you need help with laundry? No   Do you need help taking your medications? No   Do you need help managing money? No   Do you ever drive or ride in a car without wearing a seat belt? No   Have you felt unusual stress, anger or loneliness in the last month? Yes   Who do you live with? Alone   If you need help, do you have trouble finding someone available to you? No   Have you been bothered in the last four weeks by sexual problems? No   Do you have difficulty concentrating, remembering or making decisions? No       Age-appropriate Screening Schedule:  Refer to the list below for future screening recommendations based on patient's age, sex and/or medical conditions. Orders for these recommended tests are  listed in the plan section. The patient has been provided with a written plan.    Health Maintenance   Topic Date Due   • COLORECTAL CANCER SCREENING  Never done   • DIABETIC FOOT EXAM  06/02/2018   • COVID-19 Vaccine (2 - Booster for Chandra series) 06/07/2021   • DIABETIC EYE EXAM  04/29/2023   • INFLUENZA VACCINE  08/01/2023   • HEMOGLOBIN A1C  10/27/2023   • PROSTATE CANCER SCREENING  04/27/2024   • LIPID PANEL  04/27/2024   • URINE MICROALBUMIN  04/27/2024   • ANNUAL WELLNESS VISIT  05/02/2024   • TDAP/TD VACCINES (2 - Td or Tdap) 05/02/2033   • HEPATITIS C SCREENING  Completed   • Pneumococcal Vaccine 65+  Completed   • ZOSTER VACCINE  Completed                Subjective   Ronald Irizarry is a 73 y.o. male who presents to the office for follow-up and review of labs.     History of Present Illness     The following portions of the patient's history were reviewed and updated as appropriate: allergies, current medications, past family history, past medical history, past social history, past surgical history and problem list.    Review of Systems    Objective   Physical Exam    Assessment & Plan   Diagnoses and all orders for this visit:    1. Encounter for subsequent annual wellness visit (AWV) in Medicare patient (Primary)    2. Type 2 diabetes mellitus without complication, without long-term current use of insulin  -     metFORMIN ER (GLUCOPHAGE-XR) 500 MG 24 hr tablet; Take 2 tablets by mouth Every Evening.  Dispense: 180 tablet; Refill: 1    3. Gastroesophageal reflux disease without esophagitis  -     pantoprazole (PROTONIX) 40 MG EC tablet; Take 1 tablet by mouth Daily.  Dispense: 90 tablet; Refill: 1    4. Need for tetanus booster  -     Tdap Vaccine Greater Than or Equal To 8yo IM    5. Colon cancer screening  -     Cologuard - Stool, Per Rectum; Future         Labs are reviewed with patient.      Lab on 04/27/2023   Component Date Value Ref Range Status   • Glucose 04/27/2023 95  70 - 99 mg/dL Final    • BUN 04/27/2023 16  7 - 23 mg/dL Final   • Creatinine 04/27/2023 0.80  0.70 - 1.30 mg/dL Final   • Sodium 04/27/2023 134 (L)  137 - 145 mmol/L Final   • Potassium 04/27/2023 4.1  3.4 - 5.0 mmol/L Final   • Chloride 04/27/2023 99 (L)  101 - 112 mmol/L Final   • CO2 04/27/2023 28.0  22.0 - 30.0 mmol/L Final   • Calcium 04/27/2023 9.1  8.4 - 10.2 mg/dL Final   • Total Protein 04/27/2023 7.1  6.3 - 8.6 g/dL Final   • Albumin 04/27/2023 4.2  3.5 - 5.0 g/dL Final   • ALT (SGPT) 04/27/2023 20  <=50 U/L Final   • AST (SGOT) 04/27/2023 27  17 - 59 U/L Final   • Alkaline Phosphatase 04/27/2023 70  38 - 126 U/L Final   • Total Bilirubin 04/27/2023 0.9  0.2 - 1.3 mg/dL Final   • Globulin 04/27/2023 2.9  2.3 - 3.5 gm/dL Final   • A/G Ratio 04/27/2023 1.4  1.1 - 1.8 g/dL Final   • BUN/Creatinine Ratio 04/27/2023 20.0  7.0 - 25.0 Final   • Anion Gap 04/27/2023 7.0  5.0 - 15.0 mmol/L Final   • eGFR 04/27/2023 92.9  >60.0 mL/min/1.73 Final   • Free T4 04/27/2023 1.61  0.93 - 1.70 ng/dL Final   • TSH 04/27/2023 1.270  0.270 - 4.200 uIU/mL Final   • Color, UA 04/27/2023 Yellow  Yellow, Straw Final   • Appearance, UA 04/27/2023 Clear  Clear Final   • pH, UA 04/27/2023 5.5  5.5 - 8.0 Final   • Specific Gravity, UA 04/27/2023 1.015  1.005 - 1.030 Final   • Glucose, UA 04/27/2023 Negative  Negative Final   • Ketones, UA 04/27/2023 15 mg/dL (1+) (A)  Negative Final   • Bilirubin, UA 04/27/2023 Negative  Negative Final   • Blood, UA 04/27/2023 Negative  Negative Final   • Protein, UA 04/27/2023 Negative  Negative Final   • Leuk Esterase, UA 04/27/2023 Negative  Negative Final   • Nitrite, UA 04/27/2023 Negative  Negative Final   • Urobilinogen, UA 04/27/2023 0.2 E.U./dL  0.2 - 1.0 E.U./dL Final   • Total Cholesterol 04/27/2023 147 (L)  150 - 200 mg/dL Final   • Triglycerides 04/27/2023 62  <=150 mg/dL Final   • HDL Cholesterol 04/27/2023 42  40 - 59 mg/dL Final   • LDL Cholesterol  04/27/2023 92  <=100 mg/dL Final   • VLDL Cholesterol  04/27/2023 13  5 - 40 mg/dL Final   • LDL/HDL Ratio 04/27/2023 2.20  0.00 - 3.55 Final   • Microalbumin/Creatinine Ratio 04/27/2023    Final    Unable to calculate   • Creatinine, Urine 04/27/2023 60.0  mg/dL Final   • Microalbumin, Urine 04/27/2023 <1.2  mg/dL Final   • PSA 04/27/2023 1.760  0.000 - 4.000 ng/mL Final   • Hemoglobin A1C 04/27/2023 6.90 (H)  4.80 - 5.60 % Final   • WBC 04/27/2023 5.56  3.40 - 10.80 10*3/mm3 Final   • RBC 04/27/2023 4.82  4.14 - 5.80 10*6/mm3 Final   • Hemoglobin 04/27/2023 14.3  13.0 - 17.7 g/dL Final   • Hematocrit 04/27/2023 44.7  37.5 - 51.0 % Final   • MCV 04/27/2023 92.7  79.0 - 97.0 fL Final   • MCH 04/27/2023 29.7  26.6 - 33.0 pg Final   • MCHC 04/27/2023 32.0  31.5 - 35.7 g/dL Final   • RDW 04/27/2023 12.8  12.3 - 15.4 % Final   • RDW-SD 04/27/2023 42.5  37.0 - 54.0 fl Final   • MPV 04/27/2023 9.0  6.0 - 12.0 fL Final   • Platelets 04/27/2023 205  140 - 450 10*3/mm3 Final   • Neutrophil % 04/27/2023 39.5 (L)  42.7 - 76.0 % Final   • Lymphocyte % 04/27/2023 46.2 (H)  19.6 - 45.3 % Final   • Monocyte % 04/27/2023 7.6  5.0 - 12.0 % Final   • Eosinophil % 04/27/2023 6.3 (H)  0.3 - 6.2 % Final   • Basophil % 04/27/2023 0.4  0.0 - 1.5 % Final   • Neutrophils, Absolute 04/27/2023 2.20  1.70 - 7.00 10*3/mm3 Final   • Lymphocytes, Absolute 04/27/2023 2.57  0.70 - 3.10 10*3/mm3 Final   • Monocytes, Absolute 04/27/2023 0.42  0.10 - 0.90 10*3/mm3 Final   • Eosinophils, Absolute 04/27/2023 0.35  0.00 - 0.40 10*3/mm3 Final   • Basophils, Absolute 04/27/2023 0.02  0.00 - 0.20 10*3/mm3 Final   ]          This document has been electronically signed by ANNAMARIE Timmons on May 20, 2023 05:41 CDT      CMS Preventative Services Quick Reference  Risk Factors Identified During Encounter:    Dental Screening Recommended  Vision Screening Recommended    The above risks/problems have been discussed with the patient.  Pertinent information has been shared with the patient in the After Visit  Summary.    Diagnoses and all orders for this visit:    1. Encounter for subsequent annual wellness visit (AWV) in Medicare patient (Primary)    2. Type 2 diabetes mellitus without complication, without long-term current use of insulin  -     metFORMIN ER (GLUCOPHAGE-XR) 500 MG 24 hr tablet; Take 2 tablets by mouth Every Evening.  Dispense: 180 tablet; Refill: 1    3. Gastroesophageal reflux disease without esophagitis  -     pantoprazole (PROTONIX) 40 MG EC tablet; Take 1 tablet by mouth Daily.  Dispense: 90 tablet; Refill: 1    4. Need for tetanus booster  -     Tdap Vaccine Greater Than or Equal To 8yo IM    5. Colon cancer screening  -     Cologuard - Stool, Per Rectum; Future    Is encouraged to follow a cardiac diabetic diet as his A1c has risen slightly.  We will be updating tetanus booster.  We will check on his diabetic eye exam from Dr. Medina.  Will order Cologuard.    Follow Up:   Next Medicare Wellness visit to be scheduled in 1 year.      An After Visit Summary and PPPS were made available to the patient.

## 2023-05-22 DIAGNOSIS — I10 ESSENTIAL HYPERTENSION: Chronic | ICD-10-CM

## 2023-05-22 RX ORDER — LISINOPRIL 10 MG/1
10 TABLET ORAL DAILY
Qty: 90 TABLET | Refills: 1 | Status: SHIPPED | OUTPATIENT
Start: 2023-05-22

## 2023-09-05 ENCOUNTER — LAB (OUTPATIENT)
Dept: LAB | Facility: OTHER | Age: 73
End: 2023-09-05
Payer: MEDICARE

## 2023-09-05 DIAGNOSIS — E78.2 MIXED HYPERLIPIDEMIA: Primary | Chronic | ICD-10-CM

## 2023-09-05 DIAGNOSIS — M25.541 JOINT PAIN IN BOTH HANDS: ICD-10-CM

## 2023-09-05 DIAGNOSIS — M25.542 JOINT PAIN IN BOTH HANDS: ICD-10-CM

## 2023-09-05 DIAGNOSIS — I10 ESSENTIAL HYPERTENSION: ICD-10-CM

## 2023-09-05 DIAGNOSIS — I10 ESSENTIAL HYPERTENSION: Chronic | ICD-10-CM

## 2023-09-05 DIAGNOSIS — E11.9 TYPE 2 DIABETES MELLITUS WITHOUT COMPLICATION, WITHOUT LONG-TERM CURRENT USE OF INSULIN: Chronic | ICD-10-CM

## 2023-09-05 DIAGNOSIS — E78.2 MIXED HYPERLIPIDEMIA: Chronic | ICD-10-CM

## 2023-09-05 LAB
ALBUMIN SERPL-MCNC: 4.2 G/DL (ref 3.5–5)
ALBUMIN/GLOB SERPL: 1.4 G/DL (ref 1.1–1.8)
ALP SERPL-CCNC: 52 U/L (ref 38–126)
ALT SERPL W P-5'-P-CCNC: 20 U/L
ANION GAP SERPL CALCULATED.3IONS-SCNC: 6 MMOL/L (ref 5–15)
AST SERPL-CCNC: 21 U/L (ref 17–59)
BASOPHILS # BLD AUTO: 0.06 10*3/MM3 (ref 0–0.2)
BASOPHILS NFR BLD AUTO: 0.9 % (ref 0–1.5)
BILIRUB SERPL-MCNC: 0.7 MG/DL (ref 0–1.2)
BILIRUB UR QL STRIP: NEGATIVE
BUN SERPL-MCNC: 14 MG/DL (ref 7–23)
BUN/CREAT SERPL: 18.4 (ref 7–25)
CALCIUM SPEC-SCNC: 9.2 MG/DL (ref 8.4–10.2)
CHLORIDE SERPL-SCNC: 100 MMOL/L (ref 101–112)
CHOLEST SERPL-MCNC: 137 MG/DL (ref 150–200)
CLARITY UR: CLEAR
CO2 SERPL-SCNC: 31 MMOL/L (ref 22–30)
COLOR UR: YELLOW
CREAT SERPL-MCNC: 0.76 MG/DL (ref 0.7–1.3)
DEPRECATED RDW RBC AUTO: 42.7 FL (ref 37–54)
EGFRCR SERPLBLD CKD-EPI 2021: 94.3 ML/MIN/1.73
EOSINOPHIL # BLD AUTO: 0.36 10*3/MM3 (ref 0–0.4)
EOSINOPHIL NFR BLD AUTO: 5.5 % (ref 0.3–6.2)
ERYTHROCYTE [DISTWIDTH] IN BLOOD BY AUTOMATED COUNT: 12.9 % (ref 12.3–15.4)
GLOBULIN UR ELPH-MCNC: 3 GM/DL (ref 2.3–3.5)
GLUCOSE SERPL-MCNC: 113 MG/DL (ref 70–99)
GLUCOSE UR STRIP-MCNC: NEGATIVE MG/DL
HCT VFR BLD AUTO: 44.3 % (ref 37.5–51)
HDLC SERPL-MCNC: 40 MG/DL (ref 40–59)
HGB BLD-MCNC: 14.6 G/DL (ref 13–17.7)
HGB UR QL STRIP.AUTO: NEGATIVE
KETONES UR QL STRIP: NEGATIVE
LDLC SERPL CALC-MCNC: 79 MG/DL
LDLC/HDLC SERPL: 1.95 {RATIO} (ref 0–3.55)
LEUKOCYTE ESTERASE UR QL STRIP.AUTO: NEGATIVE
LYMPHOCYTES # BLD AUTO: 2.98 10*3/MM3 (ref 0.7–3.1)
LYMPHOCYTES NFR BLD AUTO: 45.4 % (ref 19.6–45.3)
MCH RBC QN AUTO: 30.6 PG (ref 26.6–33)
MCHC RBC AUTO-ENTMCNC: 33 G/DL (ref 31.5–35.7)
MCV RBC AUTO: 92.9 FL (ref 79–97)
MONOCYTES # BLD AUTO: 0.45 10*3/MM3 (ref 0.1–0.9)
MONOCYTES NFR BLD AUTO: 6.8 % (ref 5–12)
NEUTROPHILS NFR BLD AUTO: 2.72 10*3/MM3 (ref 1.7–7)
NEUTROPHILS NFR BLD AUTO: 41.4 % (ref 42.7–76)
NITRITE UR QL STRIP: NEGATIVE
PH UR STRIP.AUTO: 7.5 [PH] (ref 5.5–8)
PLATELET # BLD AUTO: 216 10*3/MM3 (ref 140–450)
PMV BLD AUTO: 8.6 FL (ref 6–12)
POTASSIUM SERPL-SCNC: 4.3 MMOL/L (ref 3.4–5)
PROT SERPL-MCNC: 7.2 G/DL (ref 6.3–8.6)
PROT UR QL STRIP: NEGATIVE
RBC # BLD AUTO: 4.77 10*6/MM3 (ref 4.14–5.8)
SODIUM SERPL-SCNC: 137 MMOL/L (ref 137–145)
SP GR UR STRIP: 1.01 (ref 1–1.03)
TRIGL SERPL-MCNC: 95 MG/DL
URATE SERPL-MCNC: 4.6 MG/DL (ref 3.5–8.5)
UROBILINOGEN UR QL STRIP: NORMAL
VLDLC SERPL-MCNC: 18 MG/DL (ref 5–40)
WBC NRBC COR # BLD: 6.57 10*3/MM3 (ref 3.4–10.8)

## 2023-09-05 PROCEDURE — 85025 COMPLETE CBC W/AUTO DIFF WBC: CPT | Performed by: NURSE PRACTITIONER

## 2023-09-05 PROCEDURE — 36415 COLL VENOUS BLD VENIPUNCTURE: CPT | Performed by: NURSE PRACTITIONER

## 2023-09-05 PROCEDURE — 81003 URINALYSIS AUTO W/O SCOPE: CPT | Performed by: NURSE PRACTITIONER

## 2023-09-05 PROCEDURE — 80061 LIPID PANEL: CPT | Performed by: NURSE PRACTITIONER

## 2023-09-05 PROCEDURE — 84550 ASSAY OF BLOOD/URIC ACID: CPT | Performed by: NURSE PRACTITIONER

## 2023-09-05 PROCEDURE — 84443 ASSAY THYROID STIM HORMONE: CPT | Performed by: NURSE PRACTITIONER

## 2023-09-05 PROCEDURE — 83036 HEMOGLOBIN GLYCOSYLATED A1C: CPT | Performed by: NURSE PRACTITIONER

## 2023-09-05 PROCEDURE — 84439 ASSAY OF FREE THYROXINE: CPT | Performed by: NURSE PRACTITIONER

## 2023-09-05 PROCEDURE — 80053 COMPREHEN METABOLIC PANEL: CPT | Performed by: NURSE PRACTITIONER

## 2023-09-06 LAB
HBA1C MFR BLD: 6.9 % (ref 4.8–5.6)
T4 FREE SERPL-MCNC: 1.58 NG/DL (ref 0.93–1.7)
TSH SERPL DL<=0.05 MIU/L-ACNC: 1.17 UIU/ML (ref 0.27–4.2)

## 2023-09-12 ENCOUNTER — OFFICE VISIT (OUTPATIENT)
Dept: FAMILY MEDICINE CLINIC | Facility: CLINIC | Age: 73
End: 2023-09-12
Payer: MEDICARE

## 2023-09-12 VITALS
HEART RATE: 77 BPM | OXYGEN SATURATION: 99 % | SYSTOLIC BLOOD PRESSURE: 124 MMHG | WEIGHT: 144 LBS | DIASTOLIC BLOOD PRESSURE: 70 MMHG | BODY MASS INDEX: 20.62 KG/M2 | HEIGHT: 70 IN

## 2023-09-12 DIAGNOSIS — E78.2 MIXED HYPERLIPIDEMIA: ICD-10-CM

## 2023-09-12 DIAGNOSIS — I10 ESSENTIAL HYPERTENSION: Primary | ICD-10-CM

## 2023-09-12 DIAGNOSIS — E11.9 TYPE 2 DIABETES MELLITUS WITHOUT COMPLICATION, WITHOUT LONG-TERM CURRENT USE OF INSULIN: ICD-10-CM

## 2023-09-12 DIAGNOSIS — K21.9 GASTROESOPHAGEAL REFLUX DISEASE WITHOUT ESOPHAGITIS: ICD-10-CM

## 2023-09-12 PROCEDURE — 3074F SYST BP LT 130 MM HG: CPT | Performed by: NURSE PRACTITIONER

## 2023-09-12 PROCEDURE — 1160F RVW MEDS BY RX/DR IN RCRD: CPT | Performed by: NURSE PRACTITIONER

## 2023-09-12 PROCEDURE — 3078F DIAST BP <80 MM HG: CPT | Performed by: NURSE PRACTITIONER

## 2023-09-12 PROCEDURE — 99214 OFFICE O/P EST MOD 30 MIN: CPT | Performed by: NURSE PRACTITIONER

## 2023-09-12 PROCEDURE — 3044F HG A1C LEVEL LT 7.0%: CPT | Performed by: NURSE PRACTITIONER

## 2023-09-12 PROCEDURE — 1159F MED LIST DOCD IN RCRD: CPT | Performed by: NURSE PRACTITIONER

## 2023-09-12 NOTE — PROGRESS NOTES
Subjective   Ronald Irizarry is a 73 y.o. male who presents to the office for hypertension, hyperlipidemia, GERD, type 2 diabetes follow-up and review of labs.  Reviewing his labs his thyroid panel, uric acid and urinalysis are normal while his CBC and lipid panel are stable.  His glucose is slightly elevated at 113, A1c is remained stable at 6.9.  This is what he was at last lab draw.  Has no concerns or complaints today.    History of Present Illness     The following portions of the patient's history were reviewed and updated as appropriate: allergies, current medications, past family history, past medical history, past social history, past surgical history and problem list.    Review of Systems   Constitutional:  Negative for chills, fatigue and fever.   HENT:  Negative for congestion, sneezing, sore throat and trouble swallowing.    Eyes:  Negative for visual disturbance.   Respiratory:  Negative for cough, chest tightness, shortness of breath and wheezing.    Cardiovascular:  Negative for chest pain, palpitations and leg swelling.   Gastrointestinal:  Negative for abdominal pain, constipation, diarrhea, nausea and vomiting.   Genitourinary:  Negative for dysuria, frequency and urgency.   Musculoskeletal:  Negative for neck pain.   Skin:  Negative for rash.   Neurological:  Negative for dizziness, weakness and headaches.   Psychiatric/Behavioral:          In the past two weeks the pt has not felt down, depressed, hopeless or lost interest in doing things   All other systems reviewed and are negative.    Objective   Physical Exam  Vitals and nursing note reviewed.   Constitutional:       General: He is not in acute distress.     Appearance: He is well-developed. He is not ill-appearing.   HENT:      Head: Normocephalic and atraumatic.      Right Ear: Tympanic membrane and external ear normal.      Left Ear: Tympanic membrane and external ear normal.      Nose: Nose normal.      Mouth/Throat:      Lips: Pink.       Mouth: Mucous membranes are moist.      Pharynx: Oropharynx is clear. Uvula midline.   Eyes:      General: Lids are normal. Vision grossly intact. No scleral icterus.        Right eye: No discharge.         Left eye: No discharge.      Extraocular Movements: Extraocular movements intact.      Conjunctiva/sclera: Conjunctivae normal.      Pupils: Pupils are equal, round, and reactive to light.   Neck:      Thyroid: No thyromegaly.      Vascular: No carotid bruit.   Cardiovascular:      Rate and Rhythm: Normal rate and regular rhythm.      Pulses: Normal pulses.      Heart sounds: Normal heart sounds. No murmur heard.    No friction rub. No gallop.   Pulmonary:      Effort: Pulmonary effort is normal. No respiratory distress.      Breath sounds: Normal breath sounds and air entry. No wheezing or rales.   Abdominal:      General: Bowel sounds are normal. There is no distension.      Palpations: Abdomen is soft.      Tenderness: There is no abdominal tenderness. There is no guarding or rebound.   Musculoskeletal:         General: Normal range of motion.      Cervical back: Normal range of motion and neck supple.      Right lower leg: No edema.      Left lower leg: No edema.   Lymphadenopathy:      Cervical: No cervical adenopathy.   Skin:     General: Skin is warm and dry.      Capillary Refill: Capillary refill takes less than 2 seconds.      Findings: No rash.   Neurological:      General: No focal deficit present.      Mental Status: He is alert and oriented to person, place, and time.      GCS: GCS eye subscore is 4. GCS verbal subscore is 5. GCS motor subscore is 6.      Cranial Nerves: No cranial nerve deficit.   Psychiatric:         Behavior: Behavior normal. Behavior is cooperative.         Thought Content: Thought content normal.         Judgment: Judgment normal.       Assessment & Plan   Diagnoses and all orders for this visit:    1. Essential hypertension (Primary)  -     CBC & Differential; Future  -      Comprehensive Metabolic Panel; Future  -     Lipid Panel; Future  -     Microalbumin / Creatinine Urine Ratio - Urine, Clean Catch; Future  -     T4, Free; Future  -     TSH; Future  -     Urinalysis With Culture If Indicated -; Future    2. Mixed hyperlipidemia  -     Lipid Panel; Future    3. Type 2 diabetes mellitus without complication, without long-term current use of insulin  -     Comprehensive Metabolic Panel; Future  -     Hemoglobin A1c; Future    4. Gastroesophageal reflux disease without esophagitis    He request to hold on any medication changes at this time.  Is agreeable to having repeat labs performed in 4 months.  Is certainly encouraged to follow a cardiac diabetic diet.  Consistent activity level.  Had PSA drawn in April 2023 that was normal.     Labs are reviewed with patient.      Lab on 09/05/2023   Component Date Value Ref Range Status    Glucose 09/05/2023 113 (H)  70 - 99 mg/dL Final    BUN 09/05/2023 14  7 - 23 mg/dL Final    Creatinine 09/05/2023 0.76  0.70 - 1.30 mg/dL Final    Sodium 09/05/2023 137  137 - 145 mmol/L Final    Potassium 09/05/2023 4.3  3.4 - 5.0 mmol/L Final    Chloride 09/05/2023 100 (L)  101 - 112 mmol/L Final    CO2 09/05/2023 31.0 (H)  22.0 - 30.0 mmol/L Final    Calcium 09/05/2023 9.2  8.4 - 10.2 mg/dL Final    Total Protein 09/05/2023 7.2  6.3 - 8.6 g/dL Final    Albumin 09/05/2023 4.2  3.5 - 5.0 g/dL Final    ALT (SGPT) 09/05/2023 20  <=50 U/L Final    AST (SGOT) 09/05/2023 21  17 - 59 U/L Final    Alkaline Phosphatase 09/05/2023 52  38 - 126 U/L Final    Total Bilirubin 09/05/2023 0.7  0.0 - 1.2 mg/dL Final    Globulin 09/05/2023 3.0  2.3 - 3.5 gm/dL Final    A/G Ratio 09/05/2023 1.4  1.1 - 1.8 g/dL Final    BUN/Creatinine Ratio 09/05/2023 18.4  7.0 - 25.0 Final    Anion Gap 09/05/2023 6.0  5.0 - 15.0 mmol/L Final    eGFR 09/05/2023 94.3  >60.0 mL/min/1.73 Final    Total Cholesterol 09/05/2023 137 (L)  150 - 200 mg/dL Final    Triglycerides 09/05/2023 95  <=150  mg/dL Final    HDL Cholesterol 09/05/2023 40  40 - 59 mg/dL Final    LDL Cholesterol  09/05/2023 79  <=100 mg/dL Final    VLDL Cholesterol 09/05/2023 18  5 - 40 mg/dL Final    LDL/HDL Ratio 09/05/2023 1.95  0.00 - 3.55 Final    Hemoglobin A1C 09/05/2023 6.90 (H)  4.80 - 5.60 % Final    Color, UA 09/05/2023 Yellow  Yellow, Straw Final    Appearance, UA 09/05/2023 Clear  Clear Final    pH, UA 09/05/2023 7.5  5.5 - 8.0 Final    Specific Gravity, UA 09/05/2023 1.015  1.005 - 1.030 Final    Glucose, UA 09/05/2023 Negative  Negative Final    Ketones, UA 09/05/2023 Negative  Negative Final    Bilirubin, UA 09/05/2023 Negative  Negative Final    Blood, UA 09/05/2023 Negative  Negative Final    Protein, UA 09/05/2023 Negative  Negative Final    Leuk Esterase, UA 09/05/2023 Negative  Negative Final    Nitrite, UA 09/05/2023 Negative  Negative Final    Urobilinogen, UA 09/05/2023 0.2 E.U./dL  0.2 - 1.0 E.U./dL Final    Uric Acid 09/05/2023 4.6  3.5 - 8.5 mg/dL Final    Free T4 09/05/2023 1.58  0.93 - 1.70 ng/dL Final    TSH 09/05/2023 1.170  0.270 - 4.200 uIU/mL Final    WBC 09/05/2023 6.57  3.40 - 10.80 10*3/mm3 Final    RBC 09/05/2023 4.77  4.14 - 5.80 10*6/mm3 Final    Hemoglobin 09/05/2023 14.6  13.0 - 17.7 g/dL Final    Hematocrit 09/05/2023 44.3  37.5 - 51.0 % Final    MCV 09/05/2023 92.9  79.0 - 97.0 fL Final    MCH 09/05/2023 30.6  26.6 - 33.0 pg Final    MCHC 09/05/2023 33.0  31.5 - 35.7 g/dL Final    RDW 09/05/2023 12.9  12.3 - 15.4 % Final    RDW-SD 09/05/2023 42.7  37.0 - 54.0 fl Final    MPV 09/05/2023 8.6  6.0 - 12.0 fL Final    Platelets 09/05/2023 216  140 - 450 10*3/mm3 Final    Neutrophil % 09/05/2023 41.4 (L)  42.7 - 76.0 % Final    Lymphocyte % 09/05/2023 45.4 (H)  19.6 - 45.3 % Final    Monocyte % 09/05/2023 6.8  5.0 - 12.0 % Final    Eosinophil % 09/05/2023 5.5  0.3 - 6.2 % Final    Basophil % 09/05/2023 0.9  0.0 - 1.5 % Final    Neutrophils, Absolute 09/05/2023 2.72  1.70 - 7.00 10*3/mm3 Final     Lymphocytes, Absolute 09/05/2023 2.98  0.70 - 3.10 10*3/mm3 Final    Monocytes, Absolute 09/05/2023 0.45  0.10 - 0.90 10*3/mm3 Final    Eosinophils, Absolute 09/05/2023 0.36  0.00 - 0.40 10*3/mm3 Final    Basophils, Absolute 09/05/2023 0.06  0.00 - 0.20 10*3/mm3 Final   ]          This document has been electronically signed by ANNAMARIE Timmons on September 12, 2023 15:57 CDT

## 2023-09-21 DIAGNOSIS — K21.9 GASTROESOPHAGEAL REFLUX DISEASE WITHOUT ESOPHAGITIS: Chronic | ICD-10-CM

## 2023-09-21 DIAGNOSIS — E11.9 TYPE 2 DIABETES MELLITUS WITHOUT COMPLICATION, WITHOUT LONG-TERM CURRENT USE OF INSULIN: Chronic | ICD-10-CM

## 2023-09-22 RX ORDER — PANTOPRAZOLE SODIUM 40 MG/1
40 TABLET, DELAYED RELEASE ORAL DAILY
Qty: 90 TABLET | Refills: 1 | Status: SHIPPED | OUTPATIENT
Start: 2023-09-22

## 2023-09-22 RX ORDER — METFORMIN HYDROCHLORIDE 500 MG/1
1000 TABLET, EXTENDED RELEASE ORAL EVERY EVENING
Qty: 180 TABLET | Refills: 1 | Status: SHIPPED | OUTPATIENT
Start: 2023-09-22

## 2024-05-16 NOTE — PROGRESS NOTES
"Chief Complaint   Patient presents with   • Diabetes   • Hyperlipidemia   • Hypertension     Subjective   Ronald Irizarry is a 67 y.o. male who presents to the office for follow-up and review of labs. He has diabetes and takes his medication as directed.  His blood sugar has been controlled, and he has been compliant with his diabetic diet.  He monitors his blood sugar one time daily.  He has hyperlipidemia and takes pravastatin daily.  He has hypertension and his blood pressure has been well controlled.  He has osteoarthritis and takes over-the-counter NSAIDs as needed.  He takes Nexium daily for treatment of GERD.    The following portions of the patient's history were reviewed and updated as appropriate: allergies, current medications, past family history, past medical history, past social history, past surgical history and problem list.    Review of Systems   Constitutional: Negative for chills, fatigue and fever.   HENT: Negative for congestion, sneezing, sore throat and trouble swallowing.    Eyes: Negative for visual disturbance.   Respiratory: Negative for cough, chest tightness, shortness of breath and wheezing.    Cardiovascular: Negative for chest pain, palpitations and leg swelling.   Gastrointestinal: Negative for abdominal pain, constipation, diarrhea, nausea and vomiting.   Genitourinary: Negative for dysuria, frequency and urgency.   Musculoskeletal: Negative for neck pain.   Skin: Negative for rash.   Neurological: Negative for dizziness, weakness and headaches.   Psychiatric/Behavioral:        Patient denies any feelings of depression and has not felt down, hopeless or lost interest in any activities.   All other systems reviewed and are negative.      Objective   Vitals:    10/02/17 1327   BP: 110/56   BP Location: Left arm   Patient Position: Sitting   Cuff Size: Adult   Pulse: 80   Weight: 141 lb (64 kg)   Height: 68\" (172.7 cm)   PainSc: 0-No pain     Physical Exam   Constitutional: He is " Please call patient to follow-up home blood pressure readings for about 5 days.   oriented to person, place, and time. He appears well-developed and well-nourished. No distress.   HENT:   Head: Normocephalic and atraumatic.   Nose: Nose normal.   Mouth/Throat: Oropharynx is clear and moist. No oropharyngeal exudate.   Eyes: Conjunctivae and EOM are normal. Pupils are equal, round, and reactive to light. No scleral icterus.   Neck: Normal range of motion. Neck supple.   Cardiovascular: Normal rate, regular rhythm and normal heart sounds.  Exam reveals no gallop and no friction rub.    No murmur heard.  Pulmonary/Chest: Effort normal and breath sounds normal. No respiratory distress. He has no wheezes. He has no rales.   Abdominal: Soft. Bowel sounds are normal. He exhibits no distension. There is no tenderness. There is no rebound and no guarding.   Musculoskeletal: Normal range of motion. He exhibits no edema.   Lymphadenopathy:     He has no cervical adenopathy.   Neurological: He is alert and oriented to person, place, and time. No cranial nerve deficit.   Skin: Skin is warm and dry. No rash noted.   Psychiatric: He has a normal mood and affect. His behavior is normal. Judgment and thought content normal.   Nursing note and vitals reviewed.      Assessment/Plan   Ronald was seen today for diabetes, hyperlipidemia and hypertension.    Diagnoses and all orders for this visit:    Type 2 diabetes mellitus without complication, without long-term current use of insulin  -     Hemoglobin A1c; Future    Essential hypertension  -     CBC & Differential; Future  -     Comprehensive Metabolic Panel; Future  -     T4, Free; Future  -     TSH; Future  -     Urinalysis With / Culture If Indicated - Urine, Clean Catch; Future    Mixed hyperlipidemia  -     Lipid Panel; Future    Gastroesophageal reflux disease without esophagitis    Primary osteoarthritis involving multiple joints    Screening for prostate cancer  -     PSA Screen; Future         Labs are reviewed with patient. His glucose and hemoglobin A1c  show good control of his diabetes.  He will continue with his current diabetic medications and may monitor blood sugar one time daily.  His LDL is controlled and he will continue with pravastatin.  His blood pressure is well controlled and he will continue with lisinopril.  He will continue with Nexium for treatment of his GERD.    I have reminded him to update his yearly diabetic eye exam.  He is also due for a flu shot but wants to wait a few weeks.    PHQ-2/PHQ-9 Depression Screening 10/2/2017   Little interest or pleasure in doing things 0   Feeling down, depressed, or hopeless 0   Trouble falling or staying asleep, or sleeping too much 0   Feeling tired or having little energy 0   Poor appetite or overeating 0   Feeling bad about yourself - or that you are a failure or have let yourself or your family down 0   Trouble concentrating on things, such as reading the newspaper or watching television 0   Moving or speaking so slowly that other people could have noticed. Or the opposite - being so fidgety or restless that you have been moving around a lot more than usual 0   Thoughts that you would be better off dead, or of hurting yourself in some way 0   Total Score 0         Lab on 09/29/2017   Component Date Value Ref Range Status   • Glucose 09/29/2017 127* 70 - 100 mg/dL Final   • BUN 09/29/2017 16  8 - 25 mg/dL Final   • Creatinine 09/29/2017 0.70  0.40 - 1.30 mg/dL Final   • Sodium 09/29/2017 139  134 - 146 mmol/L Final   • Potassium 09/29/2017 4.3  3.4 - 5.4 mmol/L Final   • Chloride 09/29/2017 100  100 - 112 mmol/L Final   • CO2 09/29/2017 29.0  20.0 - 32.0 mmol/L Final   • Calcium 09/29/2017 9.4  8.4 - 10.8 mg/dL Final   • Total Protein 09/29/2017 7.1  6.7 - 8.2 g/dL Final   • Albumin 09/29/2017 4.20  3.20 - 5.50 g/dL Final   • ALT (SGPT) 09/29/2017 18  10 - 60 U/L Final   • AST (SGOT) 09/29/2017 18  10 - 60 U/L Final   • Alkaline Phosphatase 09/29/2017 51  15 - 121 U/L Final   • Total Bilirubin 09/29/2017  0.8  0.2 - 1.0 mg/dL Final   • eGFR Non African Amer 09/29/2017 112  49 - 113 mL/min/1.73 Final   • Globulin 09/29/2017 2.9  2.5 - 4.6 gm/dL Final   • A/G Ratio 09/29/2017 1.4  1.0 - 3.0 g/dL Final   • BUN/Creatinine Ratio 09/29/2017 22.9  7.0 - 25.0 Final   • Anion Gap 09/29/2017 10.0  5.0 - 15.0 mmol/L Final   • Hemoglobin A1C 09/29/2017 6.5* 4 - 5.6 % Final   • LDL Cholesterol  09/29/2017 123  0 - 129 mg/dL Final   • Free T4 09/29/2017 1.54  0.78 - 2.19 ng/dL Final   • TSH 09/29/2017 0.920  0.460 - 4.680 mIU/mL Final   • Color, UA 09/29/2017 Yellow  Yellow, Straw Final   • Appearance, UA 09/29/2017 Clear  Clear Final   • pH, UA 09/29/2017 8.0  5.5 - 8.0 Final   • Specific Gravity, UA 09/29/2017 1.015  1.005 - 1.030 Final   • Glucose, UA 09/29/2017 Negative  Negative Final   • Ketones, UA 09/29/2017 Negative  Negative Final   • Bilirubin, UA 09/29/2017 Negative  Negative Final   • Blood, UA 09/29/2017 Negative  Negative Final   • Protein, UA 09/29/2017 Negative  Negative Final   • Leuk Esterase, UA 09/29/2017 Negative  Negative Final   • Nitrite, UA 09/29/2017 Negative  Negative Final   • Urobilinogen, UA 09/29/2017 0.2 E.U./dL  0.2 - 1.0 E.U./dL Final   • WBC 09/29/2017 6.55  3.20 - 9.80 10*3/mm3 Final   • RBC 09/29/2017 4.96  4.37 - 5.74 10*6/mm3 Final   • Hemoglobin 09/29/2017 14.7  13.7 - 17.3 g/dL Final   • Hematocrit 09/29/2017 44.8  39.0 - 49.0 % Final   • MCV 09/29/2017 90.3  80.0 - 98.0 fL Final   • MCH 09/29/2017 29.6  26.5 - 34.0 pg Final   • MCHC 09/29/2017 32.8  31.5 - 36.3 g/dL Final   • RDW 09/29/2017 12.9  11.5 - 14.5 % Final   • RDW-SD 09/29/2017 42.0  35.1 - 43.9 fl Final   • MPV 09/29/2017 9.2  8.0 - 12.0 fL Final   • Platelets 09/29/2017 253  150 - 450 10*3/mm3 Final   • Neutrophil % 09/29/2017 48.4  37.0 - 80.0 % Final   • Lymphocyte % 09/29/2017 39.1  10.0 - 50.0 % Final   • Monocyte % 09/29/2017 6.6  0.0 - 12.0 % Final   • Eosinophil % 09/29/2017 5.6  0.0 - 7.0 % Final   • Basophil %  09/29/2017 0.3  0.0 - 2.0 % Final   • Neutrophils, Absolute 09/29/2017 3.17  2.00 - 8.60 10*3/mm3 Final   • Lymphocytes, Absolute 09/29/2017 2.56  0.60 - 4.20 10*3/mm3 Final   • Monocytes, Absolute 09/29/2017 0.43  0.00 - 0.90 10*3/mm3 Final   • Eosinophils, Absolute 09/29/2017 0.37  0.00 - 0.70 10*3/mm3 Final   • Basophils, Absolute 09/29/2017 0.02  0.00 - 0.20 10*3/mm3 Final   ]